# Patient Record
Sex: FEMALE | Race: WHITE | NOT HISPANIC OR LATINO | Employment: FULL TIME | URBAN - NONMETROPOLITAN AREA
[De-identification: names, ages, dates, MRNs, and addresses within clinical notes are randomized per-mention and may not be internally consistent; named-entity substitution may affect disease eponyms.]

---

## 2017-07-28 ENCOUNTER — OFFICE VISIT (OUTPATIENT)
Dept: INTERNAL MEDICINE | Facility: CLINIC | Age: 22
End: 2017-07-28

## 2017-07-28 VITALS
HEIGHT: 65 IN | BODY MASS INDEX: 48.82 KG/M2 | OXYGEN SATURATION: 98 % | TEMPERATURE: 96.4 F | WEIGHT: 293 LBS | SYSTOLIC BLOOD PRESSURE: 100 MMHG | HEART RATE: 68 BPM | DIASTOLIC BLOOD PRESSURE: 72 MMHG

## 2017-07-28 DIAGNOSIS — S01.512A LACERATION OF LABIAL MUCOSA WITHOUT COMPLICATION, INITIAL ENCOUNTER: Primary | ICD-10-CM

## 2017-07-28 PROCEDURE — 99213 OFFICE O/P EST LOW 20 MIN: CPT | Performed by: FAMILY MEDICINE

## 2017-07-29 NOTE — PROGRESS NOTES
Subjective    Gracie Cagle is a 22 y.o. female here for:  Chief Complaint   Patient presents with   • Injury     VAGINAL AREA INJURY; OCCURRED WEDNESDAY EVENING. PT CAME HOME AND DIDN'T TURN ON ANY LIGHTS AND SAT ON SOMETHING. SHE WENT TO THE BATHROOM AND NOTICED BLEEDING. SHE STILL HAS BLEEDING AND SWELLING TODAY     History of Present Illness     Patient thinks she's sustained an injury in the vaginal area. Started having bleeding and pain after sitting on cough Wednesday night, was dark. She felt something painful then noted the bleeding. Bleeding continued until yesterday, has ceased but she continues to have pain on the right side of genital area. She does not feel bleeding was vaginal. She denies any sexual abuse or other causes of injury, insists she sat on something. Believes last tetanus shot was with 6th grade vaccinations. Patient prefers to not have chaperone, feels more comfortable with me and does not wish to have a stranger in for exam.    The following portions of the patient's history were reviewed and updated as appropriate: allergies, current medications, past family history, past medical history, past social history, past surgical history and problem list.    Review of Systems    Vitals:    07/28/17 1450   BP: 100/72   Pulse: 68   Temp: 96.4 °F (35.8 °C)   SpO2: 98%       Objective   Physical Exam   Constitutional: Vital signs are normal. She appears well-developed and well-nourished. She is active. She does not have a sickly appearance. She does not appear ill.   Appears stated age. Well groomed. Obese     HENT:   Head: Normocephalic and atraumatic.   Right Ear: Hearing normal.   Left Ear: Hearing normal.   Eyes: EOM are normal. Pupils are equal, round, and reactive to light.   Neck: Neck supple.   Pulmonary/Chest: Effort normal.   Genitourinary:       Pelvic exam was performed with patient supine. No vaginal discharge found.   Neurological: She is alert. Gait normal.   Skin: Skin is  warm. She is not diaphoretic. No cyanosis. No pallor.   Psychiatric: She has a normal mood and affect. Her speech is normal and behavior is normal. Judgment normal.   Nursing note and vitals reviewed.      Assessment/Plan   Gracie was seen today for injury.    Diagnoses and all orders for this visit:    Laceration of labial mucosa without complication, initial encounter    keep area clean, may use cool compresses to help with discomfort. Should be fine re: tetanus, TDaP due next year. Patient denies sexual cause, I believe she's telling the truth. I urged her to look carefully at couch to see what she may have cut herself on.           Kiki Baeza MD

## 2017-08-21 ENCOUNTER — OFFICE VISIT (OUTPATIENT)
Dept: INTERNAL MEDICINE | Facility: CLINIC | Age: 22
End: 2017-08-21

## 2017-08-21 VITALS
TEMPERATURE: 96.4 F | HEIGHT: 65 IN | RESPIRATION RATE: 12 BRPM | SYSTOLIC BLOOD PRESSURE: 102 MMHG | WEIGHT: 284.2 LBS | OXYGEN SATURATION: 98 % | DIASTOLIC BLOOD PRESSURE: 70 MMHG | BODY MASS INDEX: 47.35 KG/M2 | HEART RATE: 94 BPM

## 2017-08-21 DIAGNOSIS — J02.9 ACUTE PHARYNGITIS, UNSPECIFIED ETIOLOGY: Primary | ICD-10-CM

## 2017-08-21 DIAGNOSIS — N92.6 MISSED MENSES: ICD-10-CM

## 2017-08-21 DIAGNOSIS — R30.0 DYSURIA: ICD-10-CM

## 2017-08-21 LAB
B-HCG UR QL: NEGATIVE
BILIRUB BLD-MCNC: ABNORMAL MG/DL
CLARITY, POC: CLEAR
COLOR UR: YELLOW
GLUCOSE UR STRIP-MCNC: NEGATIVE MG/DL
INTERNAL NEGATIVE CONTROL: NEGATIVE
INTERNAL POSITIVE CONTROL: POSITIVE
KETONES UR QL: NEGATIVE
LEUKOCYTE EST, POC: ABNORMAL
Lab: NORMAL
NITRITE UR-MCNC: NEGATIVE MG/ML
PH UR: 9 [PH] (ref 5–8)
PROT UR STRIP-MCNC: NEGATIVE MG/DL
RBC # UR STRIP: NEGATIVE /UL
SP GR UR: 1.01 (ref 1–1.03)
UROBILINOGEN UR QL: ABNORMAL

## 2017-08-21 PROCEDURE — 81025 URINE PREGNANCY TEST: CPT | Performed by: NURSE PRACTITIONER

## 2017-08-21 PROCEDURE — 99213 OFFICE O/P EST LOW 20 MIN: CPT | Performed by: NURSE PRACTITIONER

## 2017-08-21 RX ORDER — AMOXICILLIN AND CLAVULANATE POTASSIUM 875; 125 MG/1; MG/1
1 TABLET, FILM COATED ORAL 2 TIMES DAILY
Qty: 20 TABLET | Refills: 0 | Status: SHIPPED | OUTPATIENT
Start: 2017-08-21 | End: 2017-08-31

## 2017-08-21 NOTE — PROGRESS NOTES
Chief Complaint / Reason:      Chief Complaint   Patient presents with   • Vomiting   • Headache     started Thursday, got really bad Saturday.    • URI     sinus congestion       Subjective     HPI  Started out with headache and progressively got worse. Onset Thursday and upper respiratory issues started on Friday. Tried two 200 mg OTC motrin without relief. Vomited Friday and Saturday and missed her menstrual cycle.  Accompanied by her fiancée.. Patient indicates that she is not preventing pregnancy nor is she trying, She does not believe that she is pregnant at this time but she does not have regular periods and has been checked for PCO S in the past.  She states that her sisters did have PCOS.   History taken from: patient    PMH/FH/Social History were reviewed and updated appropriately in the electronic medical record.     Review of Systems:   Review of Systems   Constitutional: Positive for appetite change, fatigue and fever. Negative for chills.   HENT: Positive for congestion, sinus pressure, sore throat and trouble swallowing.    Respiratory: Positive for cough.    Cardiovascular: Negative.    Gastrointestinal: Positive for abdominal pain and nausea. Negative for blood in stool, constipation, diarrhea and vomiting.   Genitourinary: Positive for decreased urine volume. Negative for dysuria and frequency.   Neurological: Positive for headaches.     All other systems were reviewed and are negative.  Exceptions are noted in the subjective or above.      Objective     Vital Signs  Vitals:    08/21/17 1059   BP: 102/70   Pulse: 94   Resp: 12   Temp: 96.4 °F (35.8 °C)   SpO2: 98%       Body mass index is 47.29 kg/(m^2).    Physical Exam   Constitutional: She is oriented to person, place, and time. She appears well-developed and well-nourished.   HENT:   Head: Normocephalic.   Right Ear: External ear normal. Tympanic membrane is erythematous and bulging.   Left Ear: External ear normal. Tympanic membrane is  erythematous and bulging.   Nose: Mucosal edema present. Right sinus exhibits maxillary sinus tenderness and frontal sinus tenderness. Left sinus exhibits maxillary sinus tenderness and frontal sinus tenderness.   Mouth/Throat: Mucous membranes are dry. Oropharyngeal exudate, posterior oropharyngeal edema and posterior oropharyngeal erythema present.   Cardiovascular: Normal rate, regular rhythm, normal heart sounds and intact distal pulses.    Pulmonary/Chest: Effort normal and breath sounds normal.   Abdominal: Soft. Bowel sounds are normal. There is tenderness. There is no CVA tenderness.   Lymphadenopathy:        Head (right side): Tonsillar adenopathy present.        Head (left side): Tonsillar adenopathy present.     She has cervical adenopathy.        Right cervical: Superficial cervical adenopathy present.        Left cervical: Superficial cervical adenopathy present.   Neurological: She is alert and oriented to person, place, and time.   Skin: Skin is warm and dry.   Nursing note and vitals reviewed.       Results Review:    I reviewed the patient's new clinical results.     Medication Review:     Current Outpatient Prescriptions:   •  FLUoxetine (PROzac) 20 MG capsule, Take 1 capsule by mouth Daily., Disp: 30 capsule, Rfl: 6  •  ibuprofen (ADVIL,MOTRIN) 600 MG tablet, Every 6 (Six) Hours., Disp: , Rfl:   •  topiramate (TOPAMAX) 25 MG tablet, Take 1 tablet by mouth Daily., Disp: 30 tablet, Rfl: 6  •  omeprazole (PRILOSEC) 40 MG capsule, Take 1 capsule by mouth Daily., Disp: 30 capsule, Rfl: 6  •  prazosin (MINIPRESS) 1 MG capsule, Take 1 capsule by mouth Every Night., Disp: 30 capsule, Rfl: 6  •  Prenatal Vit-Fe Fumarate-FA (PRENATAL VITAMIN) 27-0.8 MG tablet, Take 1 tablet by mouth Daily., Disp: 30 tablet, Rfl: 11  •  triamcinolone (KENALOG) 0.1 % cream, Apply  topically 2 (Two) Times a Day., Disp: 45 g, Rfl: 6    Assessment/Plan   Gracie was seen today for vomiting, headache and uri.    Diagnoses and all  orders for this visit:    Acute pharyngitis, unspecified etiology  Keep throat moisten. May use chloraseptic spray for discomfort.  Gargle with warm saltwater or use throat lozenges to relieve the sore throat.   Worsening signs and symptoms discussed.  Rest, fluids, Motrin, and humidification.  Try eating soft, easy-to-swallow foods.  Missed menses  Discussed possible pregnancy and ways to prevent.   Dysuria  Increase fluid intake and rest.  Void often to empty bladder.  Wipe from front to back.  Empty your bladder after intercourse.   Avoid potentially irritating feminine products.  Follow up as needed or if symptoms worsen.    Alyce Mitchell, APRN  08/21/2017

## 2017-08-21 NOTE — PATIENT INSTRUCTIONS
Sore Throat  A sore throat is pain, burning, irritation, or scratchiness in the throat. When you have a sore throat, you may feel pain or tenderness in your throat when you swallow or talk.  Many things can cause a sore throat, including:  · An infection.  · Seasonal allergies.  · Dryness in the air.  · Irritants, such as smoke or pollution.  · Gastroesophageal reflux disease (GERD).  · A tumor.  A sore throat is often the first sign of another sickness. It may happen with other symptoms, such as coughing, sneezing, fever, and swollen neck glands. Most sore throats go away without medical treatment.  HOME CARE INSTRUCTIONS  · Take over-the-counter medicines only as told by your health care provider.  · Drink enough fluids to keep your urine clear or pale yellow.  · Rest as needed.  · To help with pain, try:    Sipping warm liquids, such as broth, herbal tea, or warm water.    Eating or drinking cold or frozen liquids, such as frozen ice pops.    Gargling with a salt-water mixture 3-4 times a day or as needed. To make a salt-water mixture, completely dissolve ½-1 tsp of salt in 1 cup of warm water.    Sucking on hard candy or throat lozenges.    Putting a cool-mist humidifier in your bedroom at night to moisten the air.    Sitting in the bathroom with the door closed for 5-10 minutes while you run hot water in the shower.  · Do not use any tobacco products, such as cigarettes, chewing tobacco, and e-cigarettes. If you need help quitting, ask your health care provider.  SEEK MEDICAL CARE IF:  · You have a fever for more than 2-3 days.  · You have symptoms that last (are persistent) for more than 2-3 days.  · Your throat does not get better within 7 days.  · You have a fever and your symptoms suddenly get worse.  SEEK IMMEDIATE MEDICAL CARE IF:  · You have difficulty breathing.  · You cannot swallow fluids, soft foods, or your saliva.  · You have increased swelling in your throat or neck.  · You have persistent nausea  and vomiting.     This information is not intended to replace advice given to you by your health care provider. Make sure you discuss any questions you have with your health care provider.     Document Released: 01/25/2006 Document Revised: 04/10/2017 Document Reviewed: 10/07/2016  [employer] Interactive Patient Education ©2017 [employer]  Dysuria  Dysuria is pain or discomfort while urinating. The pain or discomfort may be felt in the tube that carries urine out of the bladder (urethra) or in the surrounding tissue of the genitals. The pain may also be felt in the groin area, lower abdomen, and lower back. You may have to urinate frequently or have the sudden feeling that you have to urinate (urgency). Dysuria can affect both men and women, but is more common in women.  Dysuria can be caused by many different things, including:  · Urinary tract infection in women.  · Infection of the kidney or bladder.  · Kidney stones or bladder stones.  · Certain sexually transmitted infections (STIs), such as chlamydia.  · Dehydration.  · Inflammation of the vagina.  · Use of certain medicines.  · Use of certain soaps or scented products that cause irritation.  HOME CARE INSTRUCTIONS  Watch your dysuria for any changes. The following actions may help to reduce any discomfort you are feeling:  · Drink enough fluid to keep your urine clear or pale yellow.  · Empty your bladder often. Avoid holding urine for long periods of time.  · After a bowel movement or urination, women should cleanse from front to back, using each tissue only once.  · Empty your bladder after sexual intercourse.  · Take medicines only as directed by your health care provider.  · If you were prescribed an antibiotic medicine, finish it all even if you start to feel better.  · Avoid caffeine, tea, and alcohol. They can irritate the bladder and make dysuria worse. In men, alcohol may irritate the prostate.  · Keep all follow-up visits as directed by your health  care provider. This is important.  · If you had any tests done to find the cause of dysuria, it is your responsibility to obtain your test results. Ask the lab or department performing the test when and how you will get your results. Talk with your health care provider if you have any questions about your results.  SEEK MEDICAL CARE IF:  · You develop pain in your back or sides.  · You have a fever.  · You have nausea or vomiting.  · You have blood in your urine.  · You are not urinating as often as you usually do.  SEEK IMMEDIATE MEDICAL CARE IF:  · You pain is severe and not relieved with medicines.  · You are unable to hold down any fluids.  · You or someone else notices a change in your mental function.  · You have a rapid heartbeat at rest.  · You have shaking or chills.  · You feel extremely weak.     This information is not intended to replace advice given to you by your health care provider. Make sure you discuss any questions you have with your health care provider.     Document Released: 09/15/2005 Document Revised: 04/10/2017 Document Reviewed: 08/13/2015  [employer] Interactive Patient Education ©2017 [employer]

## 2017-09-15 ENCOUNTER — HOSPITAL ENCOUNTER (EMERGENCY)
Facility: HOSPITAL | Age: 22
Discharge: HOME | End: 2017-09-15
Attending: EMERGENCY MEDICINE | Admitting: EMERGENCY MEDICINE

## 2017-09-15 VITALS
BODY MASS INDEX: 46.65 KG/M2 | HEIGHT: 65 IN | DIASTOLIC BLOOD PRESSURE: 64 MMHG | OXYGEN SATURATION: 98 % | TEMPERATURE: 98.1 F | RESPIRATION RATE: 18 BRPM | HEART RATE: 83 BPM | WEIGHT: 280 LBS | SYSTOLIC BLOOD PRESSURE: 111 MMHG

## 2017-09-15 DIAGNOSIS — T63.481A INSECT STING, ACCIDENTAL OR UNINTENTIONAL, INITIAL ENCOUNTER: Primary | ICD-10-CM

## 2017-09-15 PROCEDURE — 63710000001 PREDNISONE PER 1 MG: Performed by: PHYSICIAN ASSISTANT

## 2017-09-15 PROCEDURE — 99283 EMERGENCY DEPT VISIT LOW MDM: CPT

## 2017-09-15 RX ORDER — DIPHENHYDRAMINE HCL 25 MG
50 TABLET ORAL EVERY 6 HOURS PRN
Qty: 16 TABLET | Refills: 0 | Status: SHIPPED | OUTPATIENT
Start: 2017-09-15 | End: 2018-01-22

## 2017-09-15 RX ORDER — EPINEPHRINE 0.3 MG/.3ML
0.3 INJECTION SUBCUTANEOUS ONCE
Qty: 2 EACH | Refills: 0 | Status: SHIPPED | OUTPATIENT
Start: 2017-09-15 | End: 2017-09-16

## 2017-09-15 RX ORDER — PREDNISONE 20 MG/1
40 TABLET ORAL ONCE
Status: COMPLETED | OUTPATIENT
Start: 2017-09-15 | End: 2017-09-15

## 2017-09-15 RX ORDER — DIPHENHYDRAMINE HCL 25 MG
25 TABLET ORAL EVERY 6 HOURS PRN
Qty: 16 TABLET | Refills: 0 | Status: SHIPPED | OUTPATIENT
Start: 2017-09-15 | End: 2017-09-15

## 2017-09-15 RX ORDER — FAMOTIDINE 20 MG/1
20 TABLET, FILM COATED ORAL 2 TIMES DAILY
Qty: 30 TABLET | Refills: 0 | Status: SHIPPED | OUTPATIENT
Start: 2017-09-15 | End: 2017-09-30

## 2017-09-15 RX ORDER — PREDNISONE 20 MG/1
20 TABLET ORAL 2 TIMES DAILY
Qty: 4 TABLET | Refills: 0 | Status: SHIPPED | OUTPATIENT
Start: 2017-09-15 | End: 2017-10-04

## 2017-09-15 RX ORDER — DIPHENHYDRAMINE HCL 25 MG
50 TABLET ORAL EVERY 6 HOURS PRN
Qty: 16 TABLET | Refills: 0 | Status: SHIPPED | OUTPATIENT
Start: 2017-09-15 | End: 2017-09-15

## 2017-09-15 RX ADMIN — PREDNISONE 40 MG: 20 TABLET ORAL at 16:34

## 2017-09-15 NOTE — DISCHARGE INSTRUCTIONS
Follow-up with your primary care provider early next week for recheck.  Return to emergency department if any change or worsening of symptoms.

## 2017-09-15 NOTE — ED PROVIDER NOTES
Subjective   HPI Comments: 22-year-old female presents to emergency department complaining of insect sting to his left parietal scalp.  Patient thinks it may have been a hornet or yellow jacket.  She states she pulled her hair and threw it on the ground.  Complains of sore throat, but no difficulty swallowing or controlling secretions.  She complains of some tightness in her chest but no shortness of breath or wheezing.  She does have a history of allergy to bee venom.  She had a prescription for an EpiPen when she was a child, but none recently.  She denies dizziness or orthostasis loss of consciousness chest pain lips and tongue swelling.  Abdominal pain diarrhea or rash.    Patient is a 22 y.o. female presenting with insect bite/sting.   History provided by:  Patient  Insect Bite   Attacking animal: Insect, possibly hornet or yellow jacket.  Time since incident:  2 hours  Pain details:     Quality:  Itching and burning    Severity:  Mild    Timing:  Unable to specify    Progression:  Unchanged  Incident location:  Home  Provoked: unprovoked    Animal's rabies vaccination status:  Unknown  Animal in possession: no    Tetanus status:  Unknown  Relieved by:  Nothing  Worsened by:  Nothing  Ineffective treatments:  None tried  Associated symptoms: no rash and no swelling        Review of Systems   Skin: Negative for rash.        Per history of present illness   All other systems reviewed and are negative.      Past Medical History:   Diagnosis Date   • Amenorrhea, secondary    • Depression    • Nightmares    • Obesity    • Ulnar neuropathy at elbow        Allergies   Allergen Reactions   • Bee Venom Anaphylaxis   • Sertraline    • Sertraline Hcl Other (See Comments)     Nightmares       History reviewed. No pertinent surgical history.    Family History   Problem Relation Age of Onset   • Depression Mother    • Diabetes Mother    • Hyperlipidemia Mother    • Hypertension Mother    • Arthritis Mother    • Mental  illness Mother    • Obesity Mother    • Arthritis Father    • COPD Father    • Depression Father    • Heart disease Father    • Hypertension Father    • Hyperlipidemia Father    • Stroke Father    • Obesity Father    • Mental illness Father    • Heart attack Father    • Depression Maternal Aunt    • Diabetes Maternal Aunt    • Hypertension Maternal Aunt    • Mental illness Maternal Aunt    • Obesity Maternal Aunt    • Cancer Paternal Aunt    • Diabetes Other      Grandmother        Social History     Social History   • Marital status:      Spouse name: N/A   • Number of children: N/A   • Years of education: N/A     Social History Main Topics   • Smoking status: Passive Smoke Exposure - Never Smoker     Packs/day: 0.50     Types: Cigarettes   • Smokeless tobacco: None   • Alcohol use No   • Drug use: No   • Sexual activity: Not Asked     Other Topics Concern   • None     Social History Narrative   • None           Objective   Physical Exam   Constitutional: She is oriented to person, place, and time. She appears well-developed and well-nourished. No distress.   Alert oriented, not in acute distress.  Resting comfortably, no respiratory distress stridor or wheezing difficulty controlling secretions cough rash.   HENT:   Head: Normocephalic and atraumatic.   Right Ear: External ear normal.   Left Ear: External ear normal.   Nose: Nose normal.   Mouth/Throat: Oropharynx is clear and moist. No oropharyngeal exudate.   Eyes: Conjunctivae and EOM are normal. Pupils are equal, round, and reactive to light. Right eye exhibits no discharge. Left eye exhibits no discharge. No scleral icterus.   Neck: Normal range of motion. Neck supple. No JVD present. No tracheal deviation present. No thyromegaly present.   Cardiovascular: Normal rate, regular rhythm, normal heart sounds and intact distal pulses.  Exam reveals no gallop and no friction rub.    No murmur heard.  Pulmonary/Chest: Effort normal and breath sounds normal.  No stridor. No respiratory distress. She has no wheezes. She has no rales.   Respiratory effort normal no tachypnea respiratory distress or accessory muscle use.  Thoracic expansion is normal.   Abdominal: Soft. She exhibits no distension and no mass. There is no tenderness. There is no guarding.   Musculoskeletal: Normal range of motion. She exhibits no edema, tenderness or deformity.   Neurological: She is alert and oriented to person, place, and time. She has normal reflexes. She displays normal reflexes. No cranial nerve deficit. She exhibits normal muscle tone. Coordination normal.   Skin: Skin is warm and dry. No rash noted. She is not diaphoretic. No erythema. No pallor.   No evidence of sting when evaluating scalp.  No erythema, no real.  No induration.   Psychiatric: She has a normal mood and affect. Her behavior is normal. Judgment and thought content normal.   Nursing note and vitals reviewed.      Procedures         ED Course  ED Course   Comment By Time   Patient had 25 mg of Benadryl in route.  She is in no acute distress.  She was given 40 mg of prednisone by mouth here in the emergency department.  We'll discharge to home with Pepcid and Benadryl and prednisone for 2 days.  Prescription for EpiPen given as well.  Follow-up with primary care provider and recheck.  Return to emergency department if any change or worsening.  Patient and family voice understanding and are agreeable to plan. Cedrick Elliott PA-C 09/15 1703                  [facility]    Final diagnoses:   Insect sting, accidental or unintentional, initial encounter            Cedrick Elliott PA-C  09/15/17 [id number]

## 2017-10-04 ENCOUNTER — OFFICE VISIT (OUTPATIENT)
Dept: INTERNAL MEDICINE | Facility: CLINIC | Age: 22
End: 2017-10-04

## 2017-10-04 VITALS
TEMPERATURE: 97.4 F | HEART RATE: 78 BPM | DIASTOLIC BLOOD PRESSURE: 78 MMHG | WEIGHT: 290 LBS | OXYGEN SATURATION: 97 % | HEIGHT: 65 IN | BODY MASS INDEX: 48.32 KG/M2 | SYSTOLIC BLOOD PRESSURE: 100 MMHG

## 2017-10-04 DIAGNOSIS — L20.84 INTRINSIC ECZEMA: Primary | ICD-10-CM

## 2017-10-04 DIAGNOSIS — Z91.030 BEE STING ALLERGY: ICD-10-CM

## 2017-10-04 PROCEDURE — 99214 OFFICE O/P EST MOD 30 MIN: CPT | Performed by: FAMILY MEDICINE

## 2017-10-04 RX ORDER — TRIAMCINOLONE ACETONIDE 1 MG/G
CREAM TOPICAL 2 TIMES DAILY
Qty: 45 G | Refills: 6 | Status: SHIPPED | OUTPATIENT
Start: 2017-10-04

## 2017-10-07 RX ORDER — EPINEPHRINE 0.3 MG/.3ML
0.3 INJECTION SUBCUTANEOUS ONCE
Qty: 1 EACH | Refills: 1 | Status: SHIPPED | OUTPATIENT
Start: 2017-10-07 | End: 2017-10-07

## 2017-10-07 NOTE — PROGRESS NOTES
Subjective    Gracie Cagle is a 22 y.o. female here for:  Chief Complaint   Patient presents with   • Insect Bite     ER F/U [facility] D/C 09/19; ANAPHYLAXIS FROM POSS BEE STING   • Eczema     RT 2ND DIGIT     History of Present Illness   Seen in ER after getting stung by bee. Took some benadryl but gradually she had development of shortness of breath, voice changes. Boyfriend convinced her to go to ER. She was treated and released with Rx for EpiPen. No issues since.    Also needs refill on steroid cream. She previously had a rash on the left forearm that was dry and red with scales and the cream I prescribed cleared it. Now she has a similar rash on her right 2nd finger.     The following portions of the patient's history were reviewed and updated as appropriate: allergies, current medications, past family history, past medical history, past social history, past surgical history and problem list.    Review of Systems   Constitutional: Negative for activity change.   Respiratory: Negative for shortness of breath and wheezing.        Vitals:    10/04/17 1111   BP: 100/78   Pulse: 78   Temp: 97.4 °F (36.3 °C)   SpO2: 97%       Objective   Physical Exam   Constitutional: She is oriented to person, place, and time. Vital signs are normal. She appears well-developed and well-nourished. She is active.  Non-toxic appearance. She does not appear ill.   Appears stated age. Well groomed. Extremely Obese     HENT:   Head: Normocephalic and atraumatic.   Right Ear: Hearing and external ear normal.   Left Ear: Hearing and external ear normal.   Nose: Nose normal.   Eyes: Conjunctivae, EOM and lids are normal. Pupils are equal, round, and reactive to light.   Neck: Phonation normal. Neck supple.   Cardiovascular: Normal rate and regular rhythm.    No murmur heard.  Pulmonary/Chest: Effort normal and breath sounds normal. No stridor.   Neurological: She is alert and oriented to person, place, and time. She has normal strength.  No cranial nerve deficit.   Skin: Skin is warm and dry. She is not diaphoretic. No cyanosis.        Psychiatric: She has a normal mood and affect. Her speech is normal and behavior is normal. Judgment and thought content normal. Cognition and memory are normal.   Nursing note and vitals reviewed.      Assessment/Plan   Gracie was seen today for insect bite and eczema.    Diagnoses and all orders for this visit:    Intrinsic eczema  -     triamcinolone (KENALOG) 0.1 % cream; Apply  topically 2 (Two) Times a Day.  -     Crisaborole (EUCRISA) 2 % ointment; Apply 1 application topically 2 (Two) Times a Day.    Bee sting allergy  -     EPINEPHrine (EPIPEN) 0.3 MG/0.3ML solution auto-injector injection; Inject 0.3 mL into the shoulder, thigh, or buttocks 1 (One) Time for 1 dose. Go to ER after dose.               Kiki Baeza MD

## 2017-10-11 ENCOUNTER — OFFICE VISIT (OUTPATIENT)
Dept: INTERNAL MEDICINE | Facility: CLINIC | Age: 22
End: 2017-10-11

## 2017-10-11 VITALS
HEART RATE: 78 BPM | DIASTOLIC BLOOD PRESSURE: 70 MMHG | HEIGHT: 65 IN | WEIGHT: 286 LBS | OXYGEN SATURATION: 98 % | BODY MASS INDEX: 47.65 KG/M2 | SYSTOLIC BLOOD PRESSURE: 94 MMHG | TEMPERATURE: 97.3 F

## 2017-10-11 DIAGNOSIS — R68.89 FLU-LIKE SYMPTOMS: ICD-10-CM

## 2017-10-11 DIAGNOSIS — J18.9 COMMUNITY ACQUIRED PNEUMONIA OF RIGHT LOWER LOBE OF LUNG: Primary | ICD-10-CM

## 2017-10-11 LAB
EXPIRATION DATE: NORMAL
FLUAV AG NPH QL: NEGATIVE
FLUBV AG NPH QL: NEGATIVE
INTERNAL CONTROL: NORMAL
Lab: NORMAL

## 2017-10-11 PROCEDURE — 99214 OFFICE O/P EST MOD 30 MIN: CPT | Performed by: FAMILY MEDICINE

## 2017-10-11 PROCEDURE — 87804 INFLUENZA ASSAY W/OPTIC: CPT | Performed by: FAMILY MEDICINE

## 2017-10-11 RX ORDER — FLUOXETINE HYDROCHLORIDE 20 MG/1
20 CAPSULE ORAL
COMMUNITY
End: 2017-10-11 | Stop reason: SDUPTHER

## 2017-10-11 RX ORDER — AZITHROMYCIN 250 MG/1
TABLET, FILM COATED ORAL
COMMUNITY
Start: 2017-10-10 | End: 2017-10-11

## 2017-10-11 RX ORDER — ALBUTEROL SULFATE 90 UG/1
2 AEROSOL, METERED RESPIRATORY (INHALATION) EVERY 6 HOURS PRN
Qty: 1 INHALER | Refills: 3 | Status: SHIPPED | OUTPATIENT
Start: 2017-10-11 | End: 2018-03-27 | Stop reason: SDUPTHER

## 2017-10-11 RX ORDER — AZITHROMYCIN 250 MG/1
TABLET, FILM COATED ORAL
COMMUNITY
Start: 2017-10-10 | End: 2017-10-11 | Stop reason: SDUPTHER

## 2017-10-11 RX ORDER — LEVOFLOXACIN 750 MG/1
750 TABLET ORAL DAILY
Qty: 5 TABLET | Refills: 0 | Status: SHIPPED | OUTPATIENT
Start: 2017-10-11 | End: 2017-10-16

## 2017-10-11 RX ORDER — TOPIRAMATE 25 MG/1
25 CAPSULE, COATED PELLETS ORAL
COMMUNITY
End: 2017-10-11 | Stop reason: SDUPTHER

## 2017-10-11 NOTE — PROGRESS NOTES
"Subjective    Gracie Cagle is a 22 y.o. female here for:  Chief Complaint   Patient presents with   • URI     PT WENT TO [facility] YESTERDAY FOR CONGESTION AND FLU-LIKE SYMPTOMS. PATIENT WAS NOT FLU TESTED, WAS GIVEN AN INJECTION (UNK MED). PATIENT DOES NOT FEEL BETTER, COUGHING UP BLOOD AND HURTING IN CHEST WHEN COUGHING.     History of Present Illness   Sick for few days with cough that is productive of sputum, yellow with blood, shortness of breath, fatigue, feeling hot and cold. No measured fevers. Was seen in ER due to shortness of breath. Was given \"antibiotic\" and sent out. Used mom's oxygen last night which helped. Taking azithromycin that er doctor prescribed and worsening.    The following portions of the patient's history were reviewed and updated as appropriate: allergies, current medications, past family history, past medical history, past social history, past surgical history and problem list.    Review of Systems   Constitutional: Positive for activity change, chills and unexpected weight change.   HENT: Positive for congestion.    Respiratory: Positive for cough, shortness of breath and wheezing.        Vitals:    10/11/17 1609   BP: 94/70   Pulse: 78   Temp: 97.3 °F (36.3 °C)   SpO2: 98%       Objective   Physical Exam   Constitutional: She is oriented to person, place, and time. Vital signs are normal. She appears well-developed and well-nourished. She is active. She does not have a sickly appearance. She appears ill.   Appears stated age. Well groomed. morbidly obese.   HENT:   Head: Normocephalic and atraumatic. Hair is normal.   Right Ear: Hearing normal.   Left Ear: Hearing normal.   Nose: Nose normal.   Eyes: EOM and lids are normal. Pupils are equal, round, and reactive to light. No scleral icterus.   Neck: Phonation normal. Neck supple.   Cardiovascular: Normal rate, regular rhythm and normal heart sounds.  Exam reveals no gallop and no friction rub.    No murmur heard.  Pulmonary/Chest: " Effort normal. She has wheezes in the right middle field and the right lower field. She has rhonchi in the right lower field.   Musculoskeletal: She exhibits no deformity.   Neurological: She is alert and oriented to person, place, and time. She displays no tremor. No cranial nerve deficit. Gait normal.   Skin: Skin is warm. No rash noted. She is not diaphoretic. No cyanosis. Nails show no clubbing.   Psychiatric: She has a normal mood and affect. Her speech is normal and behavior is normal. Judgment and thought content normal. Cognition and memory are normal.   Nursing note and vitals reviewed.       Office Visit on 10/11/2017   Component Date Value Ref Range Status   • Rapid Influenza A Ag 10/11/2017 NEGATIVE   Final   • Rapid Influenza B Ag 10/11/2017 NEGATIVE   Final   • Internal Control 10/11/2017 Passed  Passed Final   • Lot Number 10/11/2017 [id number]   Final   • Expiration Date 10/11/2017 02/01/2019   Final         Assessment/Plan   Gracie was seen today for uri.    Diagnoses and all orders for this visit:    Community acquired pneumonia of right lower lobe of lung  -     albuterol (PROVENTIL HFA;VENTOLIN HFA) 108 (90 Base) MCG/ACT inhaler; Inhale 2 puffs Every 6 (Six) Hours As Needed for Wheezing or Shortness of Air.  -     levoFLOXacin (LEVAQUIN) 750 MG tablet; Take 1 tablet by mouth Daily for 5 days.    Flu-like symptoms  -     POCT Influenza A/B    given decadron per chart everywhere. Changed antibiotic, stop azithromycin. Stressed hydration. Return to clinic if not improving.           Kiki Baeza MD

## 2017-10-21 PROBLEM — T63.441A BEE STING REACTION: Status: ACTIVE | Noted: 2017-10-21

## 2017-10-21 PROBLEM — J40 BRONCHITIS: Status: ACTIVE | Noted: 2017-10-21

## 2017-11-06 ENCOUNTER — APPOINTMENT (OUTPATIENT)
Dept: CT IMAGING | Facility: HOSPITAL | Age: 22
End: 2017-11-06

## 2017-11-06 ENCOUNTER — HOSPITAL ENCOUNTER (EMERGENCY)
Facility: HOSPITAL | Age: 22
Discharge: HOME | End: 2017-11-06
Attending: EMERGENCY MEDICINE | Admitting: EMERGENCY MEDICINE

## 2017-11-06 VITALS
RESPIRATION RATE: 18 BRPM | OXYGEN SATURATION: 100 % | HEIGHT: 65 IN | SYSTOLIC BLOOD PRESSURE: 115 MMHG | WEIGHT: 260 LBS | HEART RATE: 84 BPM | BODY MASS INDEX: 43.32 KG/M2 | DIASTOLIC BLOOD PRESSURE: 72 MMHG | TEMPERATURE: 97.8 F

## 2017-11-06 DIAGNOSIS — R51.9 NONINTRACTABLE HEADACHE, UNSPECIFIED CHRONICITY PATTERN, UNSPECIFIED HEADACHE TYPE: Primary | ICD-10-CM

## 2017-11-06 PROCEDURE — 25010000002 KETOROLAC TROMETHAMINE PER 15 MG: Performed by: EMERGENCY MEDICINE

## 2017-11-06 PROCEDURE — 25010000002 PROCHLORPERAZINE EDISYLATE PER 10 MG: Performed by: EMERGENCY MEDICINE

## 2017-11-06 PROCEDURE — 96374 THER/PROPH/DIAG INJ IV PUSH: CPT

## 2017-11-06 PROCEDURE — 70450 CT HEAD/BRAIN W/O DYE: CPT

## 2017-11-06 PROCEDURE — 96361 HYDRATE IV INFUSION ADD-ON: CPT

## 2017-11-06 PROCEDURE — 25010000002 DIPHENHYDRAMINE PER 50 MG: Performed by: EMERGENCY MEDICINE

## 2017-11-06 PROCEDURE — 99283 EMERGENCY DEPT VISIT LOW MDM: CPT

## 2017-11-06 PROCEDURE — 96375 TX/PRO/DX INJ NEW DRUG ADDON: CPT

## 2017-11-06 RX ORDER — DIPHENHYDRAMINE HYDROCHLORIDE 50 MG/ML
25 INJECTION INTRAMUSCULAR; INTRAVENOUS ONCE
Status: COMPLETED | OUTPATIENT
Start: 2017-11-06 | End: 2017-11-06

## 2017-11-06 RX ORDER — BUTALBITAL, ACETAMINOPHEN AND CAFFEINE 50; 325; 40 MG/1; MG/1; MG/1
1 TABLET ORAL EVERY 6 HOURS PRN
Qty: 12 TABLET | Refills: 0 | Status: SHIPPED | OUTPATIENT
Start: 2017-11-06 | End: 2018-03-27

## 2017-11-06 RX ORDER — KETOROLAC TROMETHAMINE 30 MG/ML
10 INJECTION, SOLUTION INTRAMUSCULAR; INTRAVENOUS ONCE
Status: COMPLETED | OUTPATIENT
Start: 2017-11-06 | End: 2017-11-06

## 2017-11-06 RX ADMIN — KETOROLAC TROMETHAMINE 10 MG: 30 INJECTION, SOLUTION INTRAMUSCULAR at 17:15

## 2017-11-06 RX ADMIN — SODIUM CHLORIDE 1000 ML: 9 INJECTION, SOLUTION INTRAVENOUS at 17:13

## 2017-11-06 RX ADMIN — PROCHLORPERAZINE EDISYLATE 10 MG: 5 INJECTION INTRAMUSCULAR; INTRAVENOUS at 17:16

## 2017-11-06 RX ADMIN — DIPHENHYDRAMINE HYDROCHLORIDE 25 MG: 50 INJECTION, SOLUTION INTRAMUSCULAR; INTRAVENOUS at 17:14

## 2017-11-09 DIAGNOSIS — F33.9 RECURRENT MAJOR DEPRESSIVE DISORDER, REMISSION STATUS UNSPECIFIED (HCC): ICD-10-CM

## 2017-11-09 RX ORDER — TOPIRAMATE 25 MG/1
TABLET ORAL
Qty: 30 TABLET | Refills: 0 | Status: SHIPPED | OUTPATIENT
Start: 2017-11-09 | End: 2017-12-13 | Stop reason: SDUPTHER

## 2017-11-09 RX ORDER — FLUOXETINE HYDROCHLORIDE 20 MG/1
CAPSULE ORAL
Qty: 30 CAPSULE | Refills: 0 | Status: SHIPPED | OUTPATIENT
Start: 2017-11-09 | End: 2017-12-13 | Stop reason: SDUPTHER

## 2017-11-22 ENCOUNTER — OFFICE VISIT (OUTPATIENT)
Dept: INTERNAL MEDICINE | Facility: CLINIC | Age: 22
End: 2017-11-22

## 2017-11-22 VITALS
RESPIRATION RATE: 14 BRPM | BODY MASS INDEX: 48.82 KG/M2 | OXYGEN SATURATION: 95 % | WEIGHT: 293 LBS | HEART RATE: 82 BPM | TEMPERATURE: 97.6 F | SYSTOLIC BLOOD PRESSURE: 112 MMHG | HEIGHT: 65 IN | DIASTOLIC BLOOD PRESSURE: 70 MMHG

## 2017-11-22 DIAGNOSIS — N30.01 ACUTE CYSTITIS WITH HEMATURIA: Primary | ICD-10-CM

## 2017-11-22 LAB
B-HCG UR QL: NEGATIVE
BILIRUB BLD-MCNC: NEGATIVE MG/DL
CLARITY, POC: ABNORMAL
COLOR UR: YELLOW
GLUCOSE UR STRIP-MCNC: NEGATIVE MG/DL
INTERNAL NEGATIVE CONTROL: NEGATIVE
INTERNAL POSITIVE CONTROL: POSITIVE
KETONES UR QL: NEGATIVE
LEUKOCYTE EST, POC: ABNORMAL
Lab: NORMAL
NITRITE UR-MCNC: NEGATIVE MG/ML
PH UR: 7 [PH] (ref 5–8)
PROT UR STRIP-MCNC: NEGATIVE MG/DL
RBC # UR STRIP: ABNORMAL /UL
SP GR UR: 1.01 (ref 1–1.03)
UROBILINOGEN UR QL: NORMAL

## 2017-11-22 PROCEDURE — 99213 OFFICE O/P EST LOW 20 MIN: CPT | Performed by: FAMILY MEDICINE

## 2017-11-22 PROCEDURE — 81025 URINE PREGNANCY TEST: CPT | Performed by: FAMILY MEDICINE

## 2017-11-22 RX ORDER — CIPROFLOXACIN 250 MG/1
250 TABLET, FILM COATED ORAL 2 TIMES DAILY
Qty: 6 TABLET | Refills: 0 | Status: SHIPPED | OUTPATIENT
Start: 2017-11-22 | End: 2017-11-25

## 2017-11-22 NOTE — PATIENT INSTRUCTIONS
Urinary Tract Infection, Adult  A urinary tract infection (UTI) is an infection of any part of the urinary tract, which includes the kidneys, ureters, bladder, and urethra. These organs make, store, and get rid of urine in the body. UTI can be a bladder infection (cystitis) or kidney infection (pyelonephritis).  CAUSES  This infection may be caused by fungi, viruses, or bacteria. Bacteria are the most common cause of UTIs. This condition can also be caused by repeated incomplete emptying of the bladder during urination.   RISK FACTORS  This condition is more likely to develop if:   · You ignore your need to urinate or hold urine for long periods of time.  · You do not empty your bladder completely during urination.  · You wipe back to front after urinating or having a bowel movement, if you are female.  · You are uncircumcised, if you are male.    · You are constipated.  · You have a urinary catheter that stays in place (indwelling).  · You have a weak defense (immune) system.  · You have a medical condition that affects your bowels, kidneys, or bladder.  · You have diabetes.  · You take antibiotic medicines frequently or for long periods of time, and the antibiotics no longer work well against certain types of infections (antibiotic resistance).  · You take medicines that irritate your urinary tract.  · You are exposed to chemicals that irritate your urinary tract.  · You are female.  SYMPTOMS   Symptoms of this condition include:   · Fever.    · Frequent urination or passing small amounts of urine frequently.    · Needing to urinate urgently.    · Pain or burning with urination.    · Urine that smells bad or unusual.    · Cloudy urine.    · Pain in the lower abdomen or back.    · Trouble urinating.    · Blood in the urine.    · Vomiting or being less hungry than normal.     · Diarrhea or abdominal pain.    · Vaginal discharge, if you are female.  DIAGNOSIS  This condition is diagnosed with a medical history and  physical exam. You will also need to provide a urine sample to test your urine. Other tests may be done, including:    · Blood tests.    · Sexually transmitted disease (STD) testing.     If you have had more than one UTI, a cystoscopy or imaging studies may be done to determine the cause of the infections.   TREATMENT   Treatment for this condition often includes a combination of two or more of the following:   · Antibiotic medicine.    · Other medicines to treat less common causes of UTI.    · Over-the-counter medicines to treat pain.    · Drinking enough water to stay hydrated.  HOME CARE INSTRUCTIONS  · Take over-the-counter and prescription medicines only as told by your health care provider.  · If you were prescribed an antibiotic, take it as told by your health care provider. Do not stop taking the antibiotic even if you start to feel better.  · Avoid alcohol, caffeine, tea, and carbonated beverages. They can irritate your bladder.  · Drink enough fluid to keep your urine clear or pale yellow.  · Keep all follow-up visits as told by your health care provider. This is important.  · Make sure to:    Empty your bladder often and completely. Do not hold urine for long periods of time.    Empty your bladder before and after sex.    Wipe from front to back after a bowel movement if you are female. Use each tissue one time when you wipe.  SEEK MEDICAL CARE IF:   · You have back pain.    · You have a fever.  · You feel nauseous or vomit.    · Your symptoms do not get better after 3 days.     · Your symptoms go away and then return.  SEEK IMMEDIATE MEDICAL CARE IF:   · You have severe back pain or lower abdominal pain.    · You are vomiting and cannot keep down any medicines or water.     This information is not intended to replace advice given to you by your health care provider. Make sure you discuss any questions you have with your health care provider.     Document Released: 09/27/2006 Document Revised: 04/10/2017  Document Reviewed: 11/07/2016  [employer] Interactive Patient Education ©2017 [employer]

## 2017-11-22 NOTE — PROGRESS NOTES
Subjective    Gracie Cagle is a 22 y.o. female here for:  Chief Complaint   Patient presents with   • Nausea     for about a week   • Headache   • Dizziness     History of Present Illness   Headache. This is not like her typical migraines. She has light sensitivity, nausea, throwing up. She's been dizzy. Having to urinate more often. It hurts a bit to urinate. Has taken pregnancy test at home and was negative. Historically has not had regular menses. Has gotten  since last visit.     The following portions of the patient's history were reviewed and updated as appropriate: allergies, current medications, past family history, past medical history, past social history, past surgical history and problem list.    Review of Systems    Vitals:    11/22/17 1147   BP: 112/70   Pulse: 82   Resp: 14   Temp: 97.6 °F (36.4 °C)   SpO2: 95%       Objective   Physical Exam   Constitutional: She is oriented to person, place, and time. Vital signs are normal. She appears well-developed and well-nourished. She is active.  Non-toxic appearance. She does not appear ill.   Appears stated age. Well groomed.    HENT:   Head: Normocephalic and atraumatic.   Right Ear: Hearing normal.   Left Ear: Hearing normal.   Eyes: EOM are normal. Pupils are equal, round, and reactive to light. No scleral icterus.   Neck: Neck supple.   Pulmonary/Chest: Effort normal.   Abdominal: There is no CVA tenderness.   Neurological: She is alert and oriented to person, place, and time. Gait normal.   Skin: Skin is warm. She is not diaphoretic. No cyanosis. No pallor.   Psychiatric: She has a normal mood and affect. Her speech is normal and behavior is normal. Judgment normal.   Nursing note and vitals reviewed.    Office Visit on 11/22/2017   Component Date Value Ref Range Status   • Color 11/22/2017 Yellow  Yellow, Straw, Dark Yellow, Nora Final   • Clarity, UA 11/22/2017 Cloudy* Clear Final   • Glucose, UA 11/22/2017 Negative  Negative, 1000  mg/dL (3+) mg/dL Final   • Bilirubin 11/22/2017 Negative  Negative Final   • Ketones, UA 11/22/2017 Negative  Negative Final   • Specific Gravity  11/22/2017 1.010  1.005 - 1.030 Final   • Blood, UA 11/22/2017 50 Shantanu/ul* Negative Final   • pH, Urine 11/22/2017 7.0  5.0 - 8.0 Final   • Protein, POC 11/22/2017 Negative  Negative mg/dL Final   • Urobilinogen, UA 11/22/2017 Normal  Normal Final   • Leukocytes 11/22/2017 25 Vick/ul* Negative Final   • Nitrite, UA 11/22/2017 Negative  Negative Final   • HCG, Urine, QL 11/22/2017 Negative  Negative Final   • Lot Number 11/22/2017 [id number]   Final   • Internal Positive Control 11/22/2017 Positive   Final   • Internal Negative Control 11/22/2017 Negative   Final         Assessment/Plan   Gracie was seen today for nausea, headache and dizziness.    Diagnoses and all orders for this visit:    Acute cystitis with hematuria  -     ciprofloxacin (CIPRO) 250 MG tablet; Take 1 tablet by mouth 2 (Two) Times a Day for 3 days.  -     POC Urinalysis Dipstick, Automated  -     POC Pregnancy, Urine               Kiki Baeza MD

## 2017-12-13 DIAGNOSIS — F33.9 RECURRENT MAJOR DEPRESSIVE DISORDER, REMISSION STATUS UNSPECIFIED (HCC): ICD-10-CM

## 2017-12-13 RX ORDER — FLUOXETINE HYDROCHLORIDE 20 MG/1
CAPSULE ORAL
Qty: 30 CAPSULE | Refills: 5 | Status: SHIPPED | OUTPATIENT
Start: 2017-12-13 | End: 2018-07-27 | Stop reason: SDUPTHER

## 2017-12-13 RX ORDER — TOPIRAMATE 25 MG/1
TABLET ORAL
Qty: 30 TABLET | Refills: 5 | Status: SHIPPED | OUTPATIENT
Start: 2017-12-13 | End: 2018-03-27 | Stop reason: SDUPTHER

## 2018-01-22 ENCOUNTER — OFFICE VISIT (OUTPATIENT)
Dept: INTERNAL MEDICINE | Facility: CLINIC | Age: 23
End: 2018-01-22

## 2018-01-22 VITALS
DIASTOLIC BLOOD PRESSURE: 70 MMHG | HEART RATE: 87 BPM | BODY MASS INDEX: 48.82 KG/M2 | SYSTOLIC BLOOD PRESSURE: 100 MMHG | OXYGEN SATURATION: 98 % | HEIGHT: 65 IN | WEIGHT: 293 LBS | TEMPERATURE: 98.5 F

## 2018-01-22 DIAGNOSIS — N64.59 ABNORMAL BREAST FINDING: Primary | ICD-10-CM

## 2018-01-22 PROCEDURE — 99213 OFFICE O/P EST LOW 20 MIN: CPT | Performed by: PHYSICIAN ASSISTANT

## 2018-01-22 NOTE — PROGRESS NOTES
Gracie Clemente is a 22 y.o. female.     Subjective   History of Present Illness   Noticed a red [place] on the right breast about a month ago which has gotten larger since she first noticed it. Denies pain or itching. Only known family history of breast cancer was in a maternal aunt.         The following portions of the patient's history were reviewed and updated as appropriate: allergies, current medications, past family history, past medical history, past social history, past surgical history and problem list.    Review of Systems    Constitutional: Negative for appetite change, chills, fatigue, fever and unexpected weight change.   HENT: Negative for congestion, ear pain, hearing loss, nosebleeds, postnasal drip, rhinorrhea, sore throat, tinnitus and trouble swallowing.    Eyes: Negative for photophobia, discharge and visual disturbance.   Respiratory: Negative for cough, chest tightness, shortness of breath and wheezing.    Cardiovascular: Negative for chest pain, palpitations and leg swelling.   Gastrointestinal: Negative for abdominal distention, abdominal pain, blood in stool, constipation, diarrhea, nausea and vomiting.   Endocrine: Negative for cold intolerance, heat intolerance, polydipsia, polyphagia and polyuria.   Musculoskeletal: Negative for arthralgias, back pain, joint swelling, myalgias, neck pain and neck stiffness.   Skin: redness of right breast. Negative for pallor, rash and wound.   Allergic/Immunologic: Negative for environmental allergies, food allergies and immunocompromised state.   Neurological: Negative for dizziness, tremors, seizures, weakness, numbness and headaches.   Hematological: Negative for adenopathy. Does not bruise/bleed easily.   Psychiatric/Behavioral: Negative for sleep disturbances, agitation, behavioral problems, confusion, hallucinations, self-injury and suicidal ideas. The patient is not nervous/anxious.      Objective    Physical Exam   Pulmonary/Chest: Right  "breast exhibits skin change. Right breast exhibits no inverted nipple, no mass, no nipple discharge and no tenderness.         Constitutional: Oriented to person, place, and time. Appears well-developed and well-nourished.   HENT:   Head: Normocephalic and atraumatic.   Eyes: EOM are normal. Pupils are equal, round, and reactive to light.   Neck: Normal range of motion. Neck supple.   Cardiovascular: Normal rate, regular rhythm and normal heart sounds.    Pulmonary/Chest: Effort normal and breath sounds normal. No respiratory distress.  Has no wheezes or rales. Exhibits no chest wall tenderness.   Abdominal: Soft. Bowel sounds are normal. Exhibits no distension and no mass. There is no tenderness.   Musculoskeletal: Normal range of motion. Exhibits no tenderness.   Neurological: Alert and oriented to person, place, and time.   Skin: erythematous halo of discoloration over approximately 1/3 of the right breast. Skin is warm and dry.   Psychiatric: Has a normal mood and affect. Behavior is normal. Judgment and thought content normal.       /70  Pulse 87  Temp 98.5 °F (36.9 °C)  Ht 165.1 cm (65\")  Wt 136 kg (300 lb)  SpO2 98%  BMI 49.92 kg/m2    Nursing note and vitals reviewed.        Assessment/Plan   Gracie was seen today for red [place] r breast.    Diagnoses and all orders for this visit:    Abnormal breast finding  -     US breast right limited      If symptoms worsen prior to ultrasound resulting will consider initiating antibiotic with presumption of cellulitis.          "

## 2018-01-29 ENCOUNTER — HOSPITAL ENCOUNTER (OUTPATIENT)
Dept: ULTRASOUND IMAGING | Facility: HOSPITAL | Age: 23
Discharge: HOME | End: 2018-01-29
Admitting: PHYSICIAN ASSISTANT

## 2018-01-29 PROCEDURE — 76642 ULTRASOUND BREAST LIMITED: CPT

## 2018-01-29 RX ORDER — SULFAMETHOXAZOLE AND TRIMETHOPRIM 800; 160 MG/1; MG/1
1 TABLET ORAL 2 TIMES DAILY
Qty: 20 TABLET | Refills: 0 | Status: SHIPPED | OUTPATIENT
Start: 2018-01-29 | End: 2018-02-08

## 2018-02-06 ENCOUNTER — OFFICE VISIT (OUTPATIENT)
Dept: INTERNAL MEDICINE | Facility: CLINIC | Age: 23
End: 2018-02-06

## 2018-02-06 VITALS
RESPIRATION RATE: 14 BRPM | OXYGEN SATURATION: 98 % | DIASTOLIC BLOOD PRESSURE: 78 MMHG | BODY MASS INDEX: 48.82 KG/M2 | HEART RATE: 107 BPM | TEMPERATURE: 99.3 F | WEIGHT: 293 LBS | SYSTOLIC BLOOD PRESSURE: 118 MMHG | HEIGHT: 65 IN

## 2018-02-06 DIAGNOSIS — K21.00 REFLUX ESOPHAGITIS: Primary | ICD-10-CM

## 2018-02-06 DIAGNOSIS — L53.8 MACULAR ERYTHEMATOUS RASH: ICD-10-CM

## 2018-02-06 DIAGNOSIS — E66.01 MORBID OBESITY (HCC): ICD-10-CM

## 2018-02-06 PROCEDURE — 99214 OFFICE O/P EST MOD 30 MIN: CPT | Performed by: FAMILY MEDICINE

## 2018-02-06 RX ORDER — RANITIDINE 300 MG/1
300 TABLET ORAL NIGHTLY
Qty: 30 TABLET | Refills: 0 | Status: SHIPPED | OUTPATIENT
Start: 2018-02-06 | End: 2019-03-08

## 2018-02-06 RX ORDER — FLUCONAZOLE 150 MG/1
150 TABLET ORAL WEEKLY
Qty: 3 TABLET | Refills: 0 | Status: SHIPPED | OUTPATIENT
Start: 2018-02-06 | End: 2018-02-21

## 2018-02-06 NOTE — PROGRESS NOTES
Subjective    Gracie Clemente is a 22 y.o. female here for:  Chief Complaint   Patient presents with   • Heartburn     reflux when laying down   • spot on breast     spot on right breast getting worse, ultrasound was negative     History of Present Illness     She has terrible reflux when she lays down. Seems to be present more with antibiotic use recently. Has not tried any medicine.    Continues to have red rash on right breast. No other rashes noted. Tried antifungal cream for at least a couple of weeks. Redness is not painful nor itchy. No improvement with Bactrim, she's half way in the course of medicine. No nipple discharge. No tick bites.    Also asks what she could do to lose weight. Considering bariatric procedures. Not exercising and not counting calories. Chronic issue that is not improving over time.    The following portions of the patient's history were reviewed and updated as appropriate: allergies, current medications, past family history, past medical history, past social history, past surgical history and problem list.    Review of Systems   Constitutional: Negative for activity change and unexpected weight change.   Respiratory: Negative for shortness of breath.    Cardiovascular: Negative for chest pain.   Gastrointestinal: Negative for abdominal pain.   Skin: Positive for rash.       Vitals:    02/06/18 0845   BP: 118/78   Pulse: 107   Resp: 14   Temp: 99.3 °F (37.4 °C)   SpO2: 98%         Objective   Physical Exam   Constitutional: She is oriented to person, place, and time. Vital signs are normal. She appears well-developed and well-nourished. She is active. She does not have a sickly appearance. She does not appear ill.   Appears stated age. Well groomed. Morbid obesity   HENT:   Head: Normocephalic and atraumatic. Hair is normal.   Right Ear: Hearing normal.   Left Ear: Hearing normal.   Nose: Nose normal.   Eyes: EOM and lids are normal. Pupils are equal, round, and reactive to  light. No scleral icterus.   Neck: Phonation normal. Neck supple.   Cardiovascular: Normal rate, regular rhythm and normal heart sounds.  Exam reveals no gallop and no friction rub.    No murmur heard.  Pulmonary/Chest: Effort normal and breath sounds normal.   Musculoskeletal: She exhibits no deformity.   Neurological: She is alert and oriented to person, place, and time. She displays no tremor. No cranial nerve deficit. Gait normal.   Skin: Skin is warm. No rash noted. She is not diaphoretic. No cyanosis. Nails show no clubbing.        Psychiatric: She has a normal mood and affect. Her speech is normal and behavior is normal. Judgment and thought content normal. Cognition and memory are normal.   Nursing note and vitals reviewed.    Results for orders placed in visit on 01/22/18   US breast right limited    Narrative ULTRASOUND RIGHT BREAST     CLINICAL INDICATION:  Erythematous halo of discoloration over  approximately 1/3 of the right breast.; N64.59-Other signs and symptoms  in breast.     TECHNIQUE: Directed sonographic evaluation right breast.     COMPARISON: None.     FINDINGS:   No discrete cystic or solid mass lesion is seen. Echotexture  is unremarkable. No fluid collection is identified.       Impression No discrete mass. No evidence of abscess.     BI-RADS CATEGORY: 1 , NEGATIVE.        RECOMMENDED FOLLOW-UP:     Imaging follow-up is clinically warranted. No  immediate imaging follow-up recommended at this time.           NOTES: Mammography does not detect approximately 10-15% of breast  cancers. Physical examination of the breasts by a physician and regular  monthly breast self examinations are integral parts of breast cancer  screening.       A normal breast imaging exam does not exclude breast cancer if there is  an abnormal finding on physical examination. When clinically indicated,  a biopsy should not be postponed because of a normal breast imaging  report.          Please allow this report to  serve as a order for additional imaging  follow-up.        [place]. [id number].     NOTE: If a biopsy is performed on this patient, a copy of the pathology  report would be appreciated.     This report was finalized on 1/29/2018 11:26 AM by Titus Ashby MD.             Assessment/Plan       Gracie was seen today for heartburn and spot on breast.    Diagnoses and all orders for this visit:    Reflux esophagitis  -     raNITIdine (ZANTAC) 300 MG tablet; Take 1 tablet by mouth Every Night.    Macular erythematous rash  -     fluconazole (DIFLUCAN) 150 MG tablet; Take 1 tablet by mouth 1 (One) Time Per Week for 3 doses.  -     Ambulatory Referral to Dermatology    Morbid obesity      · Patient's BMI is above normal parameters. Follow-up plan includes:  exercise counseling and nutrition counseling.  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 Treat with antifungals but dermatology referral in case does not clear  · Reflux precautions given.    Return in about 4 weeks (around 3/6/2018) for Recheck.    Kiki Baeza MD    Please note that portions of this note may have been completed with a voice recognition program. Efforts were made to edit dictation, but occasionally words are mistranscribed.

## 2018-03-27 ENCOUNTER — OFFICE VISIT (OUTPATIENT)
Dept: INTERNAL MEDICINE | Facility: CLINIC | Age: 23
End: 2018-03-27

## 2018-03-27 ENCOUNTER — HOSPITAL ENCOUNTER (OUTPATIENT)
Dept: GENERAL RADIOLOGY | Facility: HOSPITAL | Age: 23
Discharge: HOME | End: 2018-03-27
Attending: FAMILY MEDICINE | Admitting: FAMILY MEDICINE

## 2018-03-27 VITALS
BODY MASS INDEX: 48.82 KG/M2 | TEMPERATURE: 97.6 F | HEART RATE: 84 BPM | HEIGHT: 65 IN | RESPIRATION RATE: 14 BRPM | SYSTOLIC BLOOD PRESSURE: 118 MMHG | DIASTOLIC BLOOD PRESSURE: 72 MMHG | OXYGEN SATURATION: 97 % | WEIGHT: 293 LBS

## 2018-03-27 DIAGNOSIS — K21.9 GASTROESOPHAGEAL REFLUX DISEASE, ESOPHAGITIS PRESENCE NOT SPECIFIED: ICD-10-CM

## 2018-03-27 DIAGNOSIS — Z72.0 TOBACCO ABUSE: ICD-10-CM

## 2018-03-27 DIAGNOSIS — L53.8 MACULAR ERYTHEMATOUS RASH: ICD-10-CM

## 2018-03-27 DIAGNOSIS — M25.561 ACUTE PAIN OF RIGHT KNEE: Primary | ICD-10-CM

## 2018-03-27 DIAGNOSIS — F33.9 RECURRENT MAJOR DEPRESSIVE DISORDER, REMISSION STATUS UNSPECIFIED (HCC): ICD-10-CM

## 2018-03-27 DIAGNOSIS — R06.02 SOB (SHORTNESS OF BREATH) ON EXERTION: ICD-10-CM

## 2018-03-27 DIAGNOSIS — E66.01 MORBID OBESITY (HCC): ICD-10-CM

## 2018-03-27 DIAGNOSIS — Z71.6 TOBACCO ABUSE COUNSELING: ICD-10-CM

## 2018-03-27 PROCEDURE — 73562 X-RAY EXAM OF KNEE 3: CPT

## 2018-03-27 PROCEDURE — 99214 OFFICE O/P EST MOD 30 MIN: CPT | Performed by: FAMILY MEDICINE

## 2018-03-27 PROCEDURE — 99406 BEHAV CHNG SMOKING 3-10 MIN: CPT | Performed by: FAMILY MEDICINE

## 2018-03-27 RX ORDER — ALBUTEROL SULFATE 90 UG/1
2 AEROSOL, METERED RESPIRATORY (INHALATION) EVERY 6 HOURS PRN
Qty: 1 INHALER | Refills: 3 | Status: SHIPPED | OUTPATIENT
Start: 2018-03-27 | End: 2018-08-21 | Stop reason: SDUPTHER

## 2018-03-27 RX ORDER — TOPIRAMATE 25 MG/1
25 TABLET ORAL 2 TIMES DAILY
Qty: 60 TABLET | Refills: 5 | Status: SHIPPED | OUTPATIENT
Start: 2018-03-27 | End: 2018-08-21

## 2018-03-27 RX ORDER — NICOTINE 21 MG/24HR
1 PATCH, TRANSDERMAL 24 HOURS TRANSDERMAL EVERY 24 HOURS
Qty: 30 PATCH | Refills: 1 | Status: SHIPPED | OUTPATIENT
Start: 2018-03-27 | End: 2018-04-24

## 2018-03-27 NOTE — ASSESSMENT & PLAN NOTE
Psychological condition is stable.  Continue current treatment regimen.  Psychological condition  will be reassessed at the next regular appointment.

## 2018-03-27 NOTE — PROGRESS NOTES
"Subjective    Gracie Clemente is a 22 y.o. female here for:  Chief Complaint   Patient presents with   • Obesity   • Heartburn   • right knee pain     History of Present Illness     Having severe right knee pain. Stood up the other day and got a sharp pain in the knee and it was difficult to bear weight on it. Then it started popping. Knee feels unstable somewhat and feels like it's catching/clicking. No known injuries. She feels a brace may help.    Heartburn is gone. Took the medicines twice and symptoms resolved.    She is wanting to lose weight. She is on Topamax to stabilize mood, no side effects. Also on Prozac. Not exercising regularly and not on a diet plan.    Wants to quit smoking. Has been smoking the last year up to a pack a day. Has not tried to quit previously.  also smokes, which may be a barrier to cessation, but he's wanting to quit as well.    She also has the rash, still, on the right breast area. Would like to see dermatology. Was supposed to go but lost the info.    The following portions of the patient's history were reviewed and updated as appropriate: allergies, current medications, past family history, past medical history, past social history, past surgical history and problem list.    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   Gastrointestinal: Negative for indigestion.   Musculoskeletal: Positive for arthralgias.   Psychiatric/Behavioral: Negative for behavioral problems.       Vitals:    03/27/18 1102   BP: 118/72   Pulse: 84   Resp: 14   Temp: 97.6 °F (36.4 °C)   SpO2: 97%   Weight: (!) 142 kg (312 lb)   Height: 165.1 cm (65\")         Objective   Physical Exam   Constitutional: She is oriented to person, place, and time. Vital signs are normal. She appears well-developed and well-nourished. She is active.  Non-toxic appearance. She does not appear ill. No distress. She is morbidly obese.  HENT: "   Head: Normocephalic and atraumatic. Hair is normal.   Right Ear: Hearing and external ear normal.   Left Ear: Hearing and external ear normal.   Nose: Nose normal.   Mouth/Throat: Mucous membranes are not dry.   Eyes: EOM and lids are normal. Pupils are equal, round, and reactive to light. No scleral icterus.   Neck: Neck supple.   Cardiovascular: Normal rate, regular rhythm and normal heart sounds.    No murmur heard.  Pulmonary/Chest: Effort normal and breath sounds normal.   Musculoskeletal: She exhibits no edema or deformity.        Right knee: She exhibits normal range of motion and no bony tenderness. Tenderness found. Lateral joint line and patellar tendon tenderness noted. No medial joint line tenderness noted.        Left knee: No tenderness found.   Positive Lachman right, negative left.   Neurological: She is alert and oriented to person, place, and time. She displays no tremor. No cranial nerve deficit. Gait normal.   Skin: Skin is warm and dry. No rash noted. She is not diaphoretic. No cyanosis. Nails show no clubbing.   Psychiatric: She has a normal mood and affect. Her speech is normal and behavior is normal. Judgment and thought content normal. Cognition and memory are normal.   Nursing note and vitals reviewed.        Assessment/Plan     Problem List Items Addressed This Visit        Digestive    Gastroesophageal reflux disease    Current Assessment & Plan     · Improved  · Continue Zantac as needed, avoid trigger foods  · Weight loss         Morbid obesity    Relevant Medications    topiramate (TOPAMAX) 25 MG tablet       Other    Depression    Current Assessment & Plan     Psychological condition is stable.  Continue current treatment regimen.  Psychological condition  will be reassessed at the next regular appointment.         Relevant Medications    nicotine (NICODERM CQ) 21 MG/24HR patch    topiramate (TOPAMAX) 25 MG tablet      Other Visit Diagnoses     Acute pain of right knee    -  Primary     Relevant Orders    XR Knee 3 View Right    Knee Sleeve    Macular erythematous rash        Relevant Orders    Ambulatory Referral to Dermatology    Tobacco abuse        Relevant Medications    nicotine (NICODERM CQ) 21 MG/24HR patch    Tobacco abuse counseling        SOB (shortness of breath) on exertion        Relevant Medications    albuterol (PROVENTIL HFA;VENTOLIN HFA) 108 (90 Base) MCG/ACT inhaler          I advised the patient of the risks in continuing to use tobacco, and I provided this patient with smoking cessation educational materials.    · During this visit, I spent > 3-10 minutes counseling the patient regarding smoking cessation.  · Discussed the patient's BMI with her. BMI is above normal parameters. Follow-up plan includes:  exercise counseling, nutrition counseling and pharmacological intervention.  · May need MRI knee or physical therapy if issue persists.    Return in about 4 weeks (around 4/24/2018) for Follow up on current issues.    Kiki Baeza MD    Please note that portions of this note may have been completed with a voice recognition program. Efforts were made to edit dictation, but occasionally words are mistranscribed.

## 2018-03-29 ENCOUNTER — TELEPHONE (OUTPATIENT)
Dept: INTERNAL MEDICINE | Facility: CLINIC | Age: 23
End: 2018-03-29

## 2018-04-02 ENCOUNTER — TELEPHONE (OUTPATIENT)
Dept: INTERNAL MEDICINE | Facility: CLINIC | Age: 23
End: 2018-04-02

## 2018-04-02 NOTE — TELEPHONE ENCOUNTER
Patient called and states she put a nicotine patch on and had a reaction to it at the site. She had to discontinue the patches and wants to know an alternative.

## 2018-04-24 ENCOUNTER — OFFICE VISIT (OUTPATIENT)
Dept: INTERNAL MEDICINE | Facility: CLINIC | Age: 23
End: 2018-04-24

## 2018-04-24 VITALS
HEART RATE: 74 BPM | OXYGEN SATURATION: 98 % | WEIGHT: 293 LBS | BODY MASS INDEX: 48.82 KG/M2 | DIASTOLIC BLOOD PRESSURE: 70 MMHG | TEMPERATURE: 97.7 F | SYSTOLIC BLOOD PRESSURE: 118 MMHG | RESPIRATION RATE: 14 BRPM | HEIGHT: 65 IN

## 2018-04-24 DIAGNOSIS — R06.02 SOB (SHORTNESS OF BREATH) ON EXERTION: ICD-10-CM

## 2018-04-24 DIAGNOSIS — F33.40 RECURRENT MAJOR DEPRESSIVE DISORDER, IN REMISSION (HCC): ICD-10-CM

## 2018-04-24 DIAGNOSIS — M23.51 RECURRENT RIGHT KNEE INSTABILITY: Primary | ICD-10-CM

## 2018-04-24 DIAGNOSIS — Z72.0 TOBACCO ABUSE: ICD-10-CM

## 2018-04-24 DIAGNOSIS — Z71.6 TOBACCO ABUSE COUNSELING: ICD-10-CM

## 2018-04-24 PROCEDURE — 99214 OFFICE O/P EST MOD 30 MIN: CPT | Performed by: FAMILY MEDICINE

## 2018-04-24 PROCEDURE — 99406 BEHAV CHNG SMOKING 3-10 MIN: CPT | Performed by: FAMILY MEDICINE

## 2018-04-24 NOTE — PROGRESS NOTES
"Subjective    Gracie Clemente is a 22 y.o. female here for:  Chief Complaint   Patient presents with   • Depression     History of Present Illness     Patches broke her out so she's used the gum more.  is still trying to quit. She's smoking about a pack a day, has smoked for a year. She's having some shortness of breath at times, albuterol helps.    She's been wearing the brace on the knee but then knee pops out of place it feels when she takes it off. Knee also seems to pop in her sleep. Popped when she was taking a walk.  says it is loud when it pops, can hear across the room. Does not hurt as bad when she gets up. Right knee. If she puts her weight on right knee she feels like she's rocking. Has been putting more weight on left side.     Mood seemed a bit \"unstable\" but she thinks it was from adjusted dose. She no longer has SI as she did in the past. Pleased at this point with depression treatment.    The following portions of the patient's history were reviewed and updated as appropriate: allergies, current medications, past family history, past medical history, past social history, past surgical history and problem list.    Review of Systems   Constitutional: Positive for activity change.   Respiratory: Positive for shortness of breath and wheezing.    Musculoskeletal: Positive for arthralgias, gait problem and joint swelling.       Vitals:    04/24/18 0815   BP: 118/70   Pulse: 74   Resp: 14   Temp: 97.7 °F (36.5 °C)   SpO2: 98%   Weight: (!) 142 kg (314 lb)   Height: 165.1 cm (65\")         Objective   Physical Exam   Constitutional: She is oriented to person, place, and time. Vital signs are normal. She appears well-developed and well-nourished. She is active.  Non-toxic appearance. She does not appear ill. No distress. She is morbidly obese.  HENT:   Head: Normocephalic and atraumatic. Hair is normal.   Right Ear: Hearing and external ear normal.   Left Ear: Hearing and external ear " normal.   Nose: Nose normal.   Mouth/Throat: Mucous membranes are not dry.   Eyes: EOM and lids are normal. Pupils are equal, round, and reactive to light. No scleral icterus.   Neck: Neck supple.   Cardiovascular: Normal rate, regular rhythm and normal heart sounds.    No murmur heard.  Pulmonary/Chest: Effort normal and breath sounds normal.   Musculoskeletal: She exhibits no edema or deformity.   Neurological: She is alert and oriented to person, place, and time. She displays no tremor. No cranial nerve deficit. Gait normal.   Skin: Skin is warm and dry. No rash noted. She is not diaphoretic. No cyanosis. Nails show no clubbing.   Psychiatric: She has a normal mood and affect. Her speech is normal and behavior is normal. Judgment and thought content normal. Cognition and memory are normal.   Nursing note and vitals reviewed.    Results for orders placed in visit on 03/27/18   XR Knee 3 View Right    Narrative EXAMINATION: XR KNEE 3 VW, RIGHT-03/27/2018:      INDICATION: Pain; M25.561-Pain in right knee.      COMPARISON: NONE.     FINDINGS:   1.  The osseous structures of the right knee are intact. The articular  joint space is smooth.  2.  The patella is intact and the patellofemoral space is normal and  well aligned.           Impression Normal right knee series.     D:  03/27/2018  E:  03/27/2018     This report was finalized on 3/27/2018 4:28 PM by Dr. Michel Queen MD.          Assessment/Plan     Problem List Items Addressed This Visit        Other    Depression    Current Assessment & Plan     Psychological condition is improving with treatment.  Continue current treatment regimen.  Psychological condition  will be reassessed at the next regular appointment.           Other Visit Diagnoses     Recurrent right knee instability    -  Primary    Relevant Orders    Ambulatory Referral to Orthopedic Surgery    SOB (shortness of breath) on exertion        Relevant Medications    Tiotropium Bromide Monohydrate  (SPIRIVA RESPIMAT) 1.25 MCG/ACT aerosol solution inhaler    Tobacco abuse        Tobacco abuse counseling            Ready to quit: Yes  Counseling given: Yes      I advised the patient of the risks in continuing to use tobacco, and I provided this patient with smoking cessation educational materials.    During this visit, I spent 3-5 minutes counseling the patient regarding smoking cessation.    Continue use of gum. Discouraged Chantix given underlying mental health issues.      Return in about 2 months (around 6/24/2018).    Kiki Baeza MD    Please note that portions of this note may have been completed with a voice recognition program. Efforts were made to edit dictation, but occasionally words are mistranscribed.

## 2018-07-27 DIAGNOSIS — F33.9 RECURRENT MAJOR DEPRESSIVE DISORDER, REMISSION STATUS UNSPECIFIED (HCC): ICD-10-CM

## 2018-07-27 RX ORDER — FLUOXETINE HYDROCHLORIDE 20 MG/1
CAPSULE ORAL
Qty: 30 CAPSULE | Refills: 0 | Status: SHIPPED | OUTPATIENT
Start: 2018-07-27 | End: 2018-09-04 | Stop reason: SDUPTHER

## 2018-07-31 ENCOUNTER — OFFICE VISIT (OUTPATIENT)
Dept: INTERNAL MEDICINE | Facility: CLINIC | Age: 23
End: 2018-07-31

## 2018-07-31 VITALS
DIASTOLIC BLOOD PRESSURE: 76 MMHG | HEART RATE: 73 BPM | BODY MASS INDEX: 48.82 KG/M2 | TEMPERATURE: 98.7 F | OXYGEN SATURATION: 97 % | SYSTOLIC BLOOD PRESSURE: 120 MMHG | HEIGHT: 65 IN | WEIGHT: 293 LBS

## 2018-07-31 DIAGNOSIS — Z72.820 POOR SLEEP: ICD-10-CM

## 2018-07-31 DIAGNOSIS — N92.6 IRREGULAR MENSES: ICD-10-CM

## 2018-07-31 DIAGNOSIS — R51.9 CHRONIC NONINTRACTABLE HEADACHE, UNSPECIFIED HEADACHE TYPE: ICD-10-CM

## 2018-07-31 DIAGNOSIS — R53.82 CHRONIC FATIGUE: ICD-10-CM

## 2018-07-31 DIAGNOSIS — R11.2 NAUSEA AND VOMITING, INTRACTABILITY OF VOMITING NOT SPECIFIED, UNSPECIFIED VOMITING TYPE: ICD-10-CM

## 2018-07-31 DIAGNOSIS — R06.83 SNORING: Primary | ICD-10-CM

## 2018-07-31 DIAGNOSIS — G89.29 CHRONIC NONINTRACTABLE HEADACHE, UNSPECIFIED HEADACHE TYPE: ICD-10-CM

## 2018-07-31 LAB
B-HCG UR QL: NEGATIVE
INTERNAL NEGATIVE CONTROL: NEGATIVE
INTERNAL POSITIVE CONTROL: POSITIVE
Lab: NORMAL

## 2018-07-31 PROCEDURE — 81025 URINE PREGNANCY TEST: CPT | Performed by: FAMILY MEDICINE

## 2018-07-31 PROCEDURE — 99214 OFFICE O/P EST MOD 30 MIN: CPT | Performed by: FAMILY MEDICINE

## 2018-07-31 RX ORDER — PROMETHAZINE HYDROCHLORIDE 25 MG/1
25 TABLET ORAL EVERY 6 HOURS PRN
Qty: 30 TABLET | Refills: 0 | Status: SHIPPED | OUTPATIENT
Start: 2018-07-31 | End: 2019-05-31

## 2018-08-21 ENCOUNTER — OFFICE VISIT (OUTPATIENT)
Dept: INTERNAL MEDICINE | Facility: CLINIC | Age: 23
End: 2018-08-21

## 2018-08-21 VITALS
SYSTOLIC BLOOD PRESSURE: 115 MMHG | DIASTOLIC BLOOD PRESSURE: 80 MMHG | HEIGHT: 65 IN | OXYGEN SATURATION: 97 % | HEART RATE: 98 BPM | TEMPERATURE: 98.6 F | BODY MASS INDEX: 48.82 KG/M2 | WEIGHT: 293 LBS

## 2018-08-21 DIAGNOSIS — G89.29 CHRONIC NONINTRACTABLE HEADACHE, UNSPECIFIED HEADACHE TYPE: Primary | ICD-10-CM

## 2018-08-21 DIAGNOSIS — R51.9 CHRONIC NONINTRACTABLE HEADACHE, UNSPECIFIED HEADACHE TYPE: Primary | ICD-10-CM

## 2018-08-21 DIAGNOSIS — E66.01 MORBID OBESITY (HCC): ICD-10-CM

## 2018-08-21 DIAGNOSIS — Z72.0 TOBACCO ABUSE: ICD-10-CM

## 2018-08-21 DIAGNOSIS — R06.02 SOB (SHORTNESS OF BREATH) ON EXERTION: ICD-10-CM

## 2018-08-21 DIAGNOSIS — F33.9 RECURRENT MAJOR DEPRESSIVE DISORDER, REMISSION STATUS UNSPECIFIED (HCC): ICD-10-CM

## 2018-08-21 PROCEDURE — 99214 OFFICE O/P EST MOD 30 MIN: CPT | Performed by: FAMILY MEDICINE

## 2018-08-21 RX ORDER — TOPIRAMATE 50 MG/1
50 CAPSULE, EXTENDED RELEASE ORAL DAILY
Qty: 30 CAPSULE | Refills: 3 | Status: SHIPPED | OUTPATIENT
Start: 2018-08-21 | End: 2018-12-05

## 2018-08-21 RX ORDER — ALBUTEROL SULFATE 90 UG/1
2 AEROSOL, METERED RESPIRATORY (INHALATION) EVERY 6 HOURS PRN
Qty: 1 INHALER | Refills: 3 | Status: SHIPPED | OUTPATIENT
Start: 2018-08-21 | End: 2020-12-18

## 2018-08-21 NOTE — PROGRESS NOTES
"Subjective    Gracie Clemente is a 23 y.o. female here for:  Chief Complaint   Patient presents with   • Migraine     3 week follow up for Migraines     History of Present Illness     Migraines improved with taking Topamax bid but she has a hard time remembering the second dose. She also feels mood is more stable on this medicine. She would like to try a once daily form if it's an option. Weight has not decreased with use thus far.    Continues to smoke and wheeze. Spiriva helps with her wheezing. Has tried and failed patches.  also smokes. Has rescue inhaler if she needs it.     The following portions of the patient's history were reviewed and updated as appropriate: allergies, current medications, past family history, past medical history, past social history, past surgical history and problem list.    Review of Systems   Constitutional: Negative for unexpected weight loss.   Respiratory: Positive for shortness of breath and wheezing.    Neurological: Negative for seizures and confusion.       Vitals:    08/21/18 1525   BP: 115/80   Pulse: 98   Temp: 98.6 °F (37 °C)   SpO2: 97%   Weight: (!) 145 kg (319 lb)   Height: 165.1 cm (65\")         Objective   Physical Exam   Constitutional: She is oriented to person, place, and time. Vital signs are normal. She appears well-developed and well-nourished. She is active.  Non-toxic appearance. She does not appear ill. No distress. She is morbidly obese.  HENT:   Head: Normocephalic and atraumatic. Hair is normal.   Right Ear: Hearing and external ear normal.   Left Ear: Hearing and external ear normal.   Nose: Nose normal.   Mouth/Throat: Mucous membranes are not dry.   Eyes: Pupils are equal, round, and reactive to light. EOM and lids are normal. No scleral icterus.   Neck: Neck supple.   Cardiovascular: Normal rate, regular rhythm and normal heart sounds.    No murmur heard.  Pulmonary/Chest: Effort normal. She has no decreased breath sounds. She has " wheezes. She has no rhonchi. She has no rales.   Musculoskeletal: She exhibits no edema or deformity.   Neurological: She is alert and oriented to person, place, and time. She displays no tremor. No cranial nerve deficit. Gait normal.   Skin: Skin is warm and dry. No rash noted. She is not diaphoretic. No cyanosis. Nails show no clubbing.   tattoos   Psychiatric: She has a normal mood and affect. Her speech is normal and behavior is normal. Judgment and thought content normal. Cognition and memory are normal.   Nursing note and vitals reviewed.        Assessment/Plan     Problem List Items Addressed This Visit        Digestive    Morbid obesity (CMS/HCC)    Relevant Medications    Topiramate ER 50 MG capsule sustained-release 24 hr       Other    Mood disorder (CMS/[facility])    Relevant Medications    Topiramate ER 50 MG capsule sustained-release 24 hr      Other Visit Diagnoses     Chronic nonintractable headache, unspecified headache type    -  Primary    Relevant Medications    Topiramate ER 50 MG capsule sustained-release 24 hr    SOB (shortness of breath) on exertion        Relevant Medications    albuterol (PROVENTIL HFA;VENTOLIN HFA) 108 (90 Base) MCG/ACT inhaler    budesonide-formoterol (SYMBICORT) 80-4.5 MCG/ACT inhaler    Tobacco abuse              · Briefly discussed Aimovig if Topamax were to fail  · Continue working on weight, smoking cessation  · Patient's Body mass index is 53.08 kg/m². BMI is above normal parameters. Recommendations include: pharmacological intervention.  · Out of Spiriva samples, discussed Symbicort.    Return in about 6 weeks (around 10/2/2018) for Follow up on current issues.    Kiki Baeza MD

## 2018-08-24 RX ORDER — BUDESONIDE AND FORMOTEROL FUMARATE DIHYDRATE 80; 4.5 UG/1; UG/1
2 AEROSOL RESPIRATORY (INHALATION)
Qty: 2 INHALER | Refills: 0 | COMMUNITY
Start: 2018-08-24 | End: 2019-03-13

## 2018-09-04 DIAGNOSIS — F33.9 RECURRENT MAJOR DEPRESSIVE DISORDER, REMISSION STATUS UNSPECIFIED (HCC): ICD-10-CM

## 2018-09-04 RX ORDER — FLUOXETINE HYDROCHLORIDE 20 MG/1
CAPSULE ORAL
Qty: 30 CAPSULE | Refills: 2 | Status: SHIPPED | OUTPATIENT
Start: 2018-09-04 | End: 2018-12-20 | Stop reason: SDUPTHER

## 2018-11-24 ENCOUNTER — OFFICE VISIT (OUTPATIENT)
Dept: PRIMARY CARE CLINIC | Age: 23
End: 2018-11-24
Payer: COMMERCIAL

## 2018-11-24 VITALS
DIASTOLIC BLOOD PRESSURE: 74 MMHG | OXYGEN SATURATION: 99 % | TEMPERATURE: 98 F | WEIGHT: 293 LBS | HEART RATE: 89 BPM | SYSTOLIC BLOOD PRESSURE: 136 MMHG | HEIGHT: 65 IN | BODY MASS INDEX: 48.82 KG/M2

## 2018-11-24 DIAGNOSIS — J00 ACUTE NASOPHARYNGITIS: Primary | ICD-10-CM

## 2018-11-24 PROCEDURE — 99213 OFFICE O/P EST LOW 20 MIN: CPT | Performed by: NURSE PRACTITIONER

## 2018-11-24 RX ORDER — METHYLPREDNISOLONE SODIUM SUCCINATE 125 MG/2ML
125 INJECTION, POWDER, LYOPHILIZED, FOR SOLUTION INTRAMUSCULAR; INTRAVENOUS ONCE
Status: COMPLETED | OUTPATIENT
Start: 2018-11-24 | End: 2018-11-24

## 2018-11-24 RX ORDER — GUAIFENESIN 600 MG/1
1200 TABLET, EXTENDED RELEASE ORAL 2 TIMES DAILY PRN
COMMUNITY
Start: 2018-11-24 | End: 2019-08-02

## 2018-11-24 RX ORDER — FLUOXETINE HYDROCHLORIDE 20 MG/1
CAPSULE ORAL
COMMUNITY
Start: 2018-09-04 | End: 2019-08-02

## 2018-11-24 RX ORDER — RANITIDINE 300 MG/1
300 TABLET ORAL
COMMUNITY
Start: 2018-02-06 | End: 2019-08-02

## 2018-11-24 RX ORDER — ALBUTEROL SULFATE 90 UG/1
2 AEROSOL, METERED RESPIRATORY (INHALATION)
COMMUNITY
Start: 2018-08-21 | End: 2020-01-02

## 2018-11-24 RX ORDER — TRIAMCINOLONE ACETONIDE 1 MG/G
CREAM TOPICAL
COMMUNITY
Start: 2017-10-04 | End: 2019-08-02

## 2018-11-24 RX ORDER — BROMPHENIRAMINE MALEATE, PSEUDOEPHEDRINE HYDROCHLORIDE, AND DEXTROMETHORPHAN HYDROBROMIDE 2; 30; 10 MG/5ML; MG/5ML; MG/5ML
5 SYRUP ORAL 4 TIMES DAILY PRN
Qty: 1 BOTTLE | Refills: 0 | COMMUNITY
Start: 2018-11-24 | End: 2019-08-02

## 2018-11-24 RX ORDER — TOPIRAMATE 50 MG/1
CAPSULE, EXTENDED RELEASE ORAL
COMMUNITY
Start: 2018-11-19 | End: 2019-09-11 | Stop reason: SDUPTHER

## 2018-11-24 RX ORDER — METHYLPREDNISOLONE 4 MG/1
4 TABLET ORAL SEE ADMIN INSTRUCTIONS
Qty: 1 KIT | Refills: 0 | Status: SHIPPED | OUTPATIENT
Start: 2018-11-24 | End: 2018-11-30

## 2018-11-24 RX ORDER — BUDESONIDE AND FORMOTEROL FUMARATE DIHYDRATE 80; 4.5 UG/1; UG/1
2 AEROSOL RESPIRATORY (INHALATION)
COMMUNITY
Start: 2018-08-24 | End: 2019-08-02

## 2018-11-24 RX ORDER — EPINEPHRINE 0.3 MG/.3ML
INJECTION SUBCUTANEOUS
COMMUNITY
Start: 2018-10-03 | End: 2019-08-02

## 2018-11-24 RX ADMIN — METHYLPREDNISOLONE SODIUM SUCCINATE 125 MG: 125 INJECTION, POWDER, LYOPHILIZED, FOR SOLUTION INTRAMUSCULAR; INTRAVENOUS at 17:47

## 2018-11-24 ASSESSMENT — PATIENT HEALTH QUESTIONNAIRE - PHQ9
SUM OF ALL RESPONSES TO PHQ9 QUESTIONS 1 & 2: 0
SUM OF ALL RESPONSES TO PHQ QUESTIONS 1-9: 0
2. FEELING DOWN, DEPRESSED OR HOPELESS: 0
1. LITTLE INTEREST OR PLEASURE IN DOING THINGS: 0
SUM OF ALL RESPONSES TO PHQ QUESTIONS 1-9: 0

## 2018-11-24 ASSESSMENT — ENCOUNTER SYMPTOMS: COUGH: 1

## 2018-12-05 ENCOUNTER — OFFICE VISIT (OUTPATIENT)
Dept: NEUROLOGY | Facility: CLINIC | Age: 23
End: 2018-12-05

## 2018-12-05 ENCOUNTER — OFFICE VISIT (OUTPATIENT)
Dept: INTERNAL MEDICINE | Facility: CLINIC | Age: 23
End: 2018-12-05

## 2018-12-05 VITALS
BODY MASS INDEX: 48.82 KG/M2 | SYSTOLIC BLOOD PRESSURE: 132 MMHG | DIASTOLIC BLOOD PRESSURE: 80 MMHG | HEART RATE: 66 BPM | OXYGEN SATURATION: 98 % | HEIGHT: 65 IN | TEMPERATURE: 97.1 F | RESPIRATION RATE: 16 BRPM | WEIGHT: 293 LBS

## 2018-12-05 VITALS — HEIGHT: 65 IN | BODY MASS INDEX: 48.82 KG/M2 | WEIGHT: 293 LBS | OXYGEN SATURATION: 98 % | HEART RATE: 95 BPM

## 2018-12-05 DIAGNOSIS — IMO0002 CHRONIC MIGRAINE: ICD-10-CM

## 2018-12-05 DIAGNOSIS — Z12.4 PAP SMEAR FOR CERVICAL CANCER SCREENING: ICD-10-CM

## 2018-12-05 DIAGNOSIS — G43.719 CHRONIC MIGRAINE WITHOUT AURA, INTRACTABLE, WITHOUT STATUS MIGRAINOSUS: ICD-10-CM

## 2018-12-05 DIAGNOSIS — G47.19 EXCESSIVE DAYTIME SLEEPINESS: Primary | ICD-10-CM

## 2018-12-05 DIAGNOSIS — R53.83 FATIGUE, UNSPECIFIED TYPE: ICD-10-CM

## 2018-12-05 DIAGNOSIS — N92.6 ABNORMAL MENSTRUATION: Primary | ICD-10-CM

## 2018-12-05 DIAGNOSIS — K62.5 RECTAL BLEEDING: ICD-10-CM

## 2018-12-05 DIAGNOSIS — G43.119 INTRACTABLE MIGRAINE WITH AURA WITHOUT STATUS MIGRAINOSUS: ICD-10-CM

## 2018-12-05 PROCEDURE — 99214 OFFICE O/P EST MOD 30 MIN: CPT | Performed by: NURSE PRACTITIONER

## 2018-12-05 PROCEDURE — 99214 OFFICE O/P EST MOD 30 MIN: CPT | Performed by: FAMILY MEDICINE

## 2018-12-05 RX ORDER — BUDESONIDE AND FORMOTEROL FUMARATE DIHYDRATE 80; 4.5 UG/1; UG/1
AEROSOL RESPIRATORY (INHALATION)
COMMUNITY
Start: 2018-08-24 | End: 2018-12-05

## 2018-12-05 RX ORDER — FLUOXETINE HYDROCHLORIDE 20 MG/1
CAPSULE ORAL
COMMUNITY
Start: 2018-09-04 | End: 2018-12-05

## 2018-12-05 RX ORDER — TOPIRAMATE 50 MG/1
CAPSULE, EXTENDED RELEASE ORAL
COMMUNITY
Start: 2018-11-19 | End: 2018-12-05

## 2018-12-05 RX ORDER — TRIAMCINOLONE ACETONIDE 1 MG/G
CREAM TOPICAL
COMMUNITY
Start: 2017-10-04 | End: 2018-12-05

## 2018-12-05 RX ORDER — ALBUTEROL SULFATE 90 UG/1
AEROSOL, METERED RESPIRATORY (INHALATION)
COMMUNITY
Start: 2018-08-21 | End: 2018-12-05

## 2018-12-05 RX ORDER — TOPIRAMATE 100 MG/1
100 CAPSULE, EXTENDED RELEASE ORAL DAILY
Qty: 30 CAPSULE | Refills: 4 | Status: SHIPPED | OUTPATIENT
Start: 2018-12-05 | End: 2019-03-13

## 2018-12-05 RX ORDER — EPINEPHRINE 0.3 MG/.3ML
INJECTION SUBCUTANEOUS
COMMUNITY
Start: 2018-10-03 | End: 2020-12-18

## 2018-12-05 NOTE — PROGRESS NOTES
Subjective   Gracie Clemente is a 23 y.o. female     Chief Complaint   Patient presents with   • Sleeping Problem     NP/Pt is here for GABRIELLA. Pt states that both parents have GABRIELLA but states that she does not quit breathing in her sleep. Pt states that she snores but if she turns or raises slightly she will quit snoring.    • Headache     Pt also c/o migraines. Pt states that she is on Topamax with some benefit.        History of Present Illness  Patient is very pleasant 23-year-old female in clinic today to establish with [facility] for possible GABRIELLA states she does have excessive daytime sleepiness as well as noted snoring.  Both patient's parents have sleep apnea.  She does not feel well rested upon waking.    Patient also willing to establish with [facility] for migraines.  She's had migraines for over 5 years they were occurring 20 times or more a month she was placed on Topamax and states that this has helped relieve some of her migraines.  She does complain of a combination pain of throbbing dull sharp pain behind her temples and radiating to the occipital.  She does have nausea with it but no vomiting and she is sensitive to light and sounds and smells which will worsen her migraines.    The following portions of the patient's history were reviewed and updated as appropriate: allergies, current medications, past family history, past medical history, past social history, past surgical history and problem list.    Review of Systems   Constitutional: Negative for chills and fever.   HENT: Negative for congestion, ear pain, hearing loss, rhinorrhea and sore throat.    Eyes: Negative for pain, discharge and redness.   Respiratory: Negative for cough, shortness of breath, wheezing and stridor.    Cardiovascular: Negative for chest pain, palpitations and leg swelling.   Gastrointestinal: Positive for nausea. Negative for abdominal pain, constipation and vomiting.   Endocrine: Negative for cold  "intolerance, heat intolerance and polyphagia.   Genitourinary: Negative for dysuria, flank pain, frequency and urgency.   Musculoskeletal: Negative for joint swelling, myalgias, neck pain and neck stiffness.   Skin: Negative for pallor, rash and wound.   Allergic/Immunologic: Negative for environmental allergies.   Neurological: Positive for headaches. Negative for dizziness, tremors, seizures, syncope, facial asymmetry, speech difficulty, weakness, light-headedness and numbness.   Hematological: Negative for adenopathy.   Psychiatric/Behavioral: Positive for sleep disturbance. Negative for confusion and hallucinations. The patient is not nervous/anxious.        Objective       Vitals:    12/05/18 1326   Pulse: 95   SpO2: 98%   Weight: (!) 146 kg (321 lb)   Height: 165.1 cm (65\")     GENERAL: Patient is pleasant, cooperative, appears to be stated age.  Body habitus is endomorphic.  HEAD:  Head is normocephalic and atraumatic.    NECK: Neck are non-tender without thyromegaly or adenopathy.  Carotic upstrokes are 1+/4.  No cranial or cervical bruits.  The neck is supple with a full range of motion.   CARDIOVASCULAR:  Regular rate and rhythm with normal S1 and S2 without rub or gallop.  RESPIRATORY:  Clear to auscultation without wheezes or crackle   PSYCH:  Higher cortical function/mental status:  The patient is alert.  She  is oriented x3 to time, place and person.  Recent and the remote memory appear normal.  The patient has a good fund of knowledge.  There is no visual or auditory hallucination or suicidal or homicidal ideation.  SPEECH:There is no gross evidence of aphasia, dysarthria or agnosia.      CRANIAL NERVES: Extraocular movements are full and smooth with normal pursuits and saccades.  No nystagmus noted.  The face is symmetric. Tongue midline.   MOTOR: Strength is 5/5 throughout with normal tone and bulk  No involuntary movements noted.    COORDINATION AND GAIT:  The patient walks with a narrow-based " gait.      Results for orders placed or performed in visit on 07/31/18   POC Pregnancy, Urine   Result Value Ref Range    HCG, Urine, QL Negative Negative    Lot Number [id number]     Internal Positive Control Positive     Internal Negative Control Negative          I have personally reviewed the above labs. Pt follows with PCP.    Assessment/Plan   1.  Migraine headaches: Patient was doing well on topiramate once a day but she was still having about 10-15 migraines monthly.  We will increase patient to Trokendi 100 mg daily.    2.  Excessive daytime sleepiness with snoring and a family history of sleep apnea: Schedule patient for sleep study.    I have counseled patient extensively on Sleep Hygiene including regular sleep wake schedule and stimulus control therapy. I have also discussed the importance of weight reduction because 10% reduction in body weight can reduce sleep apnea by 50 %. We have also discussed abstaining from smoking and drinking.  I have explained to patient that obstructive apnea episode is defined as the absence of airflow for at least 10 seconds.  Sleep apnea is usually accompanied by snoring, disturbed sleep, and daytime sleepiness. Patients with micrognathia, retrognathia, enlarged tonsils, tongue enlargement, and acromegaly are especially predisposed to obstructive sleep apnea. Abnormalities or weakness in the muscles can also contribute to obstructive sleep apnea. Obesity can also contribute to sleep apnea.  Sleep apnea can lead to a number of complications, ranging from daytime sleepiness to possible increased risk of cardiovascular risks.    Daytime sleepiness is the most serious. Daytime sleepiness can also increase the risk for accident-related injuries. Several studies have suggested that people with sleep apnea have two to three times as many car accidents, and five to seven times the risk for multiple accidents.    A number of cardiovascular diseases -- including high blood  pressure, heart failure, stroke, and heart arrhythmias -- have an association with obstructive sleep apnea.    Up to a third of patients with heart failure also have sleep apnea. Both central and obstructive sleep apnea are linked with heart failure. Obstructive sleep apnea is also noted to be associated with type 2 diabetes according to Dr. Marquez at [facility].  The best treatment for symptomatic obstructive sleep apnea is continuous positive airflow pressure (CPAP). Bilevel positive airway pressure (BPAP) systems may be particularly helpful for patients with coexisting lung disease and those with excessive levels of carbon dioxide.  Other treatment options including UPPP surgery, LAUP surgery, radiofrequency somnoplasty and dental appliances such Adam or Clearway.

## 2018-12-05 NOTE — PROGRESS NOTES
"Subjective    Gracie Clemente is a 23 y.o. female here for:  Chief Complaint   Patient presents with   • Menstrual Problem     week in between period. Only bled \"while standing up\"        History of Present Illness     Having frequent bleeding, periods only a few days apart. Only happens when she's walking around, up and about. Also having anal bleeding.  Patient denies constipation.  Bowel movements can be painful.  No history of colon cancer in first-degree relatives.  Patient has not yet had a Pap smear.  She is sexually active, .    The following portions of the patient's history were reviewed and updated as appropriate: allergies, current medications, past family history, past medical history, past social history, past surgical history and problem list.    Health Maintenance   Topic Date Due   • ANNUAL PHYSICAL  07/07/2000   • MENINGOCOCCAL VACCINE (Normal Risk) (1 - 2-dose series) 07/07/2013   • HEPATITIS A VACCINE ADULT (1 of 2) 07/07/2015   • INFLUENZA VACCINE  08/01/2018   • PNEUMOCOCCAL VACCINE (19-64 MEDIUM RISK) (1 of 1 - PPSV23) 01/01/2020 (Originally 7/7/2016)   • HPV VACCINES (1 - Female 3-dose series) 01/01/2020 (Originally 7/7/2008)   • TDAP/TD VACCINES (1 - Tdap) 01/01/2020 (Originally 7/7/2016)   • PAP SMEAR  12/05/2021       Review of Systems    Vitals:    12/05/18 1207   BP: 132/80   Pulse: 66   Resp: 16   Temp: 97.1 °F (36.2 °C)   TempSrc: Temporal   SpO2: 98%   Weight: (!) 146 kg (321 lb)   Height: 165.1 cm (65\")         Objective   Physical Exam   Constitutional: She is oriented to person, place, and time. Vital signs are normal. She appears well-developed and well-nourished. She is active.  Non-toxic appearance. She does not appear ill. No distress. She is morbidly obese.  HENT:   Head: Normocephalic and atraumatic.   Right Ear: Hearing normal.   Left Ear: Hearing normal.   Mouth/Throat: Mucous membranes are not dry.   Eyes: EOM are normal. No scleral icterus.   Neck: " Phonation normal. Neck supple.   Pulmonary/Chest: Effort normal.   Genitourinary: Vagina normal and cervix normal. Pelvic exam was performed with patient supine. No labial fusion. There is no rash, tenderness, lesion, injury or Bartholin's cyst on the right labia. There is no rash, tenderness, lesion, injury or Bartholin's cyst on the left labia.   Cervix is nulliparous. Cervix does not exhibit motion tenderness, discharge, friability, eversion or cyanosis. Right adnexum displays no mass, no tenderness and no fullness. Left adnexum displays no mass, no tenderness and no fullness. No erythema or bleeding in the vagina. No signs of injury around the vagina.   Genitourinary Comments: Wellington Adames MA, chaperone.  Cervix very posterior and pointed posteriorly, small and somewhat hard to visualize due to body habitus.  Bimanual exam deferred due to body habitus.     Neurological: She is alert and oriented to person, place, and time. She displays no tremor. No cranial nerve deficit.   Skin: Skin is warm. No bruising and no rash noted. She is not diaphoretic. There is pallor.   Psychiatric: She has a normal mood and affect. Her speech is normal and behavior is normal. Judgment and thought content normal. Cognition and memory are normal.   Nursing note and vitals reviewed.        Assessment/Plan     Problem List Items Addressed This Visit     None      Visit Diagnoses     Abnormal menstruation    -  Primary    Relevant Orders    Iron Profile    CBC & Differential    Comprehensive Metabolic Panel    TSH    T4, Free    US Non-ob Transvaginal    Ferritin    Protime-INR    APTT    Rectal bleeding        Relevant Orders    Iron Profile    CBC & Differential    Ambulatory Referral For Screening Colonoscopy    Ferritin    Protime-INR    APTT    Fatigue, unspecified type        Relevant Orders    Iron Profile    CBC & Differential    Comprehensive Metabolic Panel    TSH    T4, Free    Ambulatory Referral For Screening Colonoscopy     Ferritin    Protime-INR    APTT    Pap smear for cervical cancer screening                Kiki Baeza MD

## 2018-12-19 ENCOUNTER — APPOINTMENT (OUTPATIENT)
Dept: ULTRASOUND IMAGING | Facility: HOSPITAL | Age: 23
End: 2018-12-19

## 2018-12-20 DIAGNOSIS — F33.9 RECURRENT MAJOR DEPRESSIVE DISORDER, REMISSION STATUS UNSPECIFIED (HCC): ICD-10-CM

## 2018-12-20 RX ORDER — FLUOXETINE HYDROCHLORIDE 20 MG/1
CAPSULE ORAL
Qty: 30 CAPSULE | Refills: 1 | Status: SHIPPED | OUTPATIENT
Start: 2018-12-20 | End: 2019-01-28 | Stop reason: SDUPTHER

## 2019-01-08 ENCOUNTER — TELEPHONE (OUTPATIENT)
Dept: SURGERY | Facility: CLINIC | Age: 24
End: 2019-01-08

## 2019-01-08 NOTE — TELEPHONE ENCOUNTER
Called patient still no answer,let another message for patient to call office and reschedule appointment. No sow letter being mailed to patient.

## 2019-01-08 NOTE — TELEPHONE ENCOUNTER
Patient no showed for appointment with Dr Morin. Called patient cell phone no answer, left message to call the office.

## 2019-01-28 DIAGNOSIS — F33.9 RECURRENT MAJOR DEPRESSIVE DISORDER, REMISSION STATUS UNSPECIFIED (HCC): ICD-10-CM

## 2019-01-28 DIAGNOSIS — G89.29 CHRONIC NONINTRACTABLE HEADACHE, UNSPECIFIED HEADACHE TYPE: ICD-10-CM

## 2019-01-28 DIAGNOSIS — R51.9 CHRONIC NONINTRACTABLE HEADACHE, UNSPECIFIED HEADACHE TYPE: ICD-10-CM

## 2019-01-28 DIAGNOSIS — E66.01 MORBID OBESITY (HCC): ICD-10-CM

## 2019-01-28 RX ORDER — TOPIRAMATE 50 MG/1
CAPSULE, EXTENDED RELEASE ORAL
Qty: 30 EACH | Refills: 5 | Status: SHIPPED | OUTPATIENT
Start: 2019-01-28 | End: 2020-06-07

## 2019-01-28 RX ORDER — FLUOXETINE HYDROCHLORIDE 20 MG/1
CAPSULE ORAL
Qty: 30 CAPSULE | Refills: 5 | Status: SHIPPED | OUTPATIENT
Start: 2019-01-28

## 2019-03-08 ENCOUNTER — OFFICE VISIT (OUTPATIENT)
Dept: INTERNAL MEDICINE | Facility: CLINIC | Age: 24
End: 2019-03-08

## 2019-03-08 VITALS
OXYGEN SATURATION: 98 % | DIASTOLIC BLOOD PRESSURE: 78 MMHG | WEIGHT: 293 LBS | HEART RATE: 96 BPM | SYSTOLIC BLOOD PRESSURE: 126 MMHG | HEIGHT: 65 IN | BODY MASS INDEX: 48.82 KG/M2 | TEMPERATURE: 98.7 F

## 2019-03-08 DIAGNOSIS — J06.9 ACUTE URI: Primary | ICD-10-CM

## 2019-03-08 PROCEDURE — 99213 OFFICE O/P EST LOW 20 MIN: CPT | Performed by: PHYSICIAN ASSISTANT

## 2019-03-08 RX ORDER — BROMPHENIRAMINE MALEATE, PSEUDOEPHEDRINE HYDROCHLORIDE, AND DEXTROMETHORPHAN HYDROBROMIDE 2; 30; 10 MG/5ML; MG/5ML; MG/5ML
10 SYRUP ORAL 4 TIMES DAILY PRN
Qty: 200 ML | Refills: 0 | Status: SHIPPED | OUTPATIENT
Start: 2019-03-08 | End: 2019-04-04

## 2019-03-08 RX ORDER — AZITHROMYCIN 250 MG/1
TABLET, FILM COATED ORAL
Qty: 6 TABLET | Refills: 0 | Status: SHIPPED | OUTPATIENT
Start: 2019-03-08 | End: 2019-04-04

## 2019-03-08 NOTE — PROGRESS NOTES
Gracie Clemente is a 23 y.o. female.     Subjective   History of Present Illness   Here today with concern of a few days of sneezing, cough and congestion with a nose bleed last night. No fever, chills, sore throat or ear pain. She has had some headaches and little body aches. She is not taking anything for symptoms. No SOA, chest tightness or wheezing.           The following portions of the patient's history were reviewed and updated as appropriate: allergies, current medications, past family history, past medical history, past social history, past surgical history and problem list.    Review of Systems   Constitutional: Negative for appetite change, chills, fatigue, fever and unexpected weight change.   HENT: Positive for congestion, nosebleeds, postnasal drip and rhinorrhea. Negative for ear pain, sinus pressure, sinus pain and sore throat.    Respiratory: Positive for cough. Negative for chest tightness, shortness of breath and wheezing.    Cardiovascular: Negative for chest pain, palpitations and leg swelling.   Gastrointestinal: Negative for abdominal pain, diarrhea, nausea and vomiting.   Musculoskeletal: Positive for arthralgias. Negative for neck pain and neck stiffness.   Skin: Negative for rash.   Neurological: Positive for headaches. Negative for weakness.   Psychiatric/Behavioral: Negative for dysphoric mood, self-injury and suicidal ideas. The patient is not nervous/anxious.          Objective    Physical Exam   Constitutional: She is oriented to person, place, and time. She appears well-developed and well-nourished. No distress.   HENT:   Head: Normocephalic and atraumatic.   Right Ear: External ear normal.   Left Ear: External ear normal.   Nose: Nose normal.   Mouth/Throat: No oropharyngeal exudate.   Mild OP erythema.    Eyes: Conjunctivae and EOM are normal. Pupils are equal, round, and reactive to light.   Neck: Normal range of motion. Neck supple. No thyromegaly present.  "  Cardiovascular: Normal rate, regular rhythm and normal heart sounds. Exam reveals no gallop and no friction rub.   No murmur heard.  Pulmonary/Chest: Effort normal and breath sounds normal. No respiratory distress. She has no wheezes. She has no rales. She exhibits no tenderness.   Abdominal: Soft. Bowel sounds are normal. She exhibits no distension. There is no tenderness. There is no rebound and no guarding.   Musculoskeletal: Normal range of motion. She exhibits no tenderness.   Lymphadenopathy:     She has cervical adenopathy (mild anterior).   Neurological: She is alert and oriented to person, place, and time. She displays normal reflexes. No cranial nerve deficit or sensory deficit.   Skin: Skin is warm and dry. Capillary refill takes less than 2 seconds. No rash noted. She is not diaphoretic.   Psychiatric: She has a normal mood and affect. Her behavior is normal. Judgment and thought content normal.         /78   Pulse 96   Temp 98.7 °F (37.1 °C)   Ht 165.1 cm (65\")   Wt (!) 150 kg (331 lb)   SpO2 98%   BMI 55.08 kg/m²     Nursing note and vitals reviewed.          Assessment/Plan   Gracie was seen today for uri.    Diagnoses and all orders for this visit:    Acute URI  -     azithromycin (ZITHROMAX Z-DEEPTHI) 250 MG tablet; Take 2 tablets the first day, then 1 tablet daily for 4 days.  -     brompheniramine-pseudoephedrine-DM 30-2-10 MG/5ML syrup; Take 10 mL by mouth 4 (Four) Times a Day As Needed for Congestion, Cough or Allergies.      Increase PO fluid intake. Ibuprofen and/or Tylenol prn for pain and fever control.       Call or RTC if symptoms worsen or persist.              "

## 2019-03-13 ENCOUNTER — OFFICE VISIT (OUTPATIENT)
Dept: INTERNAL MEDICINE | Facility: CLINIC | Age: 24
End: 2019-03-13

## 2019-03-13 ENCOUNTER — APPOINTMENT (OUTPATIENT)
Dept: LAB | Facility: HOSPITAL | Age: 24
End: 2019-03-13

## 2019-03-13 VITALS
DIASTOLIC BLOOD PRESSURE: 84 MMHG | HEART RATE: 87 BPM | SYSTOLIC BLOOD PRESSURE: 126 MMHG | OXYGEN SATURATION: 97 % | BODY MASS INDEX: 48.82 KG/M2 | TEMPERATURE: 97.8 F | WEIGHT: 293 LBS | HEIGHT: 65 IN

## 2019-03-13 DIAGNOSIS — L98.9 SKIN LESION: Primary | ICD-10-CM

## 2019-03-13 DIAGNOSIS — R53.83 FATIGUE, UNSPECIFIED TYPE: ICD-10-CM

## 2019-03-13 LAB
ALBUMIN SERPL-MCNC: 3.9 G/DL (ref 3.5–5)
ALBUMIN/GLOB SERPL: 1.1 G/DL (ref 1–2)
ALP SERPL-CCNC: 72 U/L (ref 38–126)
ALT SERPL W P-5'-P-CCNC: 19 U/L (ref 13–69)
ANION GAP SERPL CALCULATED.3IONS-SCNC: 12.9 MMOL/L (ref 10–20)
APTT PPP: 32.6 SECONDS (ref 24.5–37.2)
AST SERPL-CCNC: 25 U/L (ref 15–46)
BASOPHILS # BLD AUTO: 0.09 10*3/MM3 (ref 0–0.2)
BASOPHILS NFR BLD AUTO: 0.7 % (ref 0–2.5)
BILIRUB SERPL-MCNC: 0.4 MG/DL (ref 0.2–1.3)
BUN BLD-MCNC: 10 MG/DL (ref 7–20)
BUN/CREAT SERPL: 10 (ref 7.1–23.5)
CALCIUM SPEC-SCNC: 8.9 MG/DL (ref 8.4–10.2)
CHLORIDE SERPL-SCNC: 109 MMOL/L (ref 98–107)
CO2 SERPL-SCNC: 24 MMOL/L (ref 26–30)
CREAT BLD-MCNC: 1 MG/DL (ref 0.6–1.3)
DEPRECATED RDW RBC AUTO: 39.8 FL (ref 37–54)
EOSINOPHIL # BLD AUTO: 0.24 10*3/MM3 (ref 0–0.7)
EOSINOPHIL NFR BLD AUTO: 1.9 % (ref 0–7)
ERYTHROCYTE [DISTWIDTH] IN BLOOD BY AUTOMATED COUNT: 12.4 % (ref 11.5–14.5)
FERRITIN SERPL-MCNC: 80.6 NG/ML (ref 6.24–137)
GFR SERPL CREATININE-BSD FRML MDRD: 70 ML/MIN/1.73
GLOBULIN UR ELPH-MCNC: 3.6 GM/DL
GLUCOSE BLD-MCNC: 117 MG/DL (ref 74–98)
HCT VFR BLD AUTO: 44 % (ref 37–47)
HGB BLD-MCNC: 14.4 G/DL (ref 12–16)
IMM GRANULOCYTES # BLD AUTO: 0.04 10*3/MM3 (ref 0–0.06)
IMM GRANULOCYTES NFR BLD AUTO: 0.3 % (ref 0–0.6)
INR PPP: 1.1 (ref 0.9–1.1)
IRON 24H UR-MRATE: 67 MCG/DL (ref 37–181)
IRON SATN MFR SERPL: 23 % (ref 11–46)
LYMPHOCYTES # BLD AUTO: 3.22 10*3/MM3 (ref 0.6–3.4)
LYMPHOCYTES NFR BLD AUTO: 25.5 % (ref 10–50)
MCH RBC QN AUTO: 28.9 PG (ref 27–31)
MCHC RBC AUTO-ENTMCNC: 32.7 G/DL (ref 30–37)
MCV RBC AUTO: 88.4 FL (ref 81–99)
MONOCYTES # BLD AUTO: 0.72 10*3/MM3 (ref 0–0.9)
MONOCYTES NFR BLD AUTO: 5.7 % (ref 0–12)
NEUTROPHILS # BLD AUTO: 8.31 10*3/MM3 (ref 2–6.9)
NEUTROPHILS NFR BLD AUTO: 65.9 % (ref 37–80)
NRBC BLD AUTO-RTO: 0 /100 WBC (ref 0–0)
PLATELET # BLD AUTO: 249 10*3/MM3 (ref 130–400)
PMV BLD AUTO: 11.1 FL (ref 6–12)
POTASSIUM BLD-SCNC: 3.9 MMOL/L (ref 3.5–5.1)
PROT SERPL-MCNC: 7.5 G/DL (ref 6.3–8.2)
PROTHROMBIN TIME: 14.6 SECONDS (ref 12–15.1)
RBC # BLD AUTO: 4.98 10*6/MM3 (ref 4.2–5.4)
SODIUM BLD-SCNC: 142 MMOL/L (ref 137–145)
T4 FREE SERPL-MCNC: 0.88 NG/DL (ref 0.78–2.19)
TIBC SERPL-MCNC: 291 MCG/DL (ref 261–497)
TSH SERPL DL<=0.05 MIU/L-ACNC: 1.78 MIU/ML (ref 0.47–4.68)
WBC NRBC COR # BLD: 12.62 10*3/MM3 (ref 4.8–10.8)

## 2019-03-13 PROCEDURE — 80053 COMPREHEN METABOLIC PANEL: CPT | Performed by: FAMILY MEDICINE

## 2019-03-13 PROCEDURE — 99213 OFFICE O/P EST LOW 20 MIN: CPT | Performed by: PHYSICIAN ASSISTANT

## 2019-03-13 PROCEDURE — 82728 ASSAY OF FERRITIN: CPT | Performed by: FAMILY MEDICINE

## 2019-03-13 PROCEDURE — 85610 PROTHROMBIN TIME: CPT | Performed by: FAMILY MEDICINE

## 2019-03-13 PROCEDURE — 83550 IRON BINDING TEST: CPT | Performed by: FAMILY MEDICINE

## 2019-03-13 PROCEDURE — 85730 THROMBOPLASTIN TIME PARTIAL: CPT | Performed by: FAMILY MEDICINE

## 2019-03-13 PROCEDURE — 83540 ASSAY OF IRON: CPT | Performed by: FAMILY MEDICINE

## 2019-03-13 PROCEDURE — 84439 ASSAY OF FREE THYROXINE: CPT | Performed by: FAMILY MEDICINE

## 2019-03-13 PROCEDURE — 84443 ASSAY THYROID STIM HORMONE: CPT | Performed by: FAMILY MEDICINE

## 2019-03-13 PROCEDURE — 36415 COLL VENOUS BLD VENIPUNCTURE: CPT | Performed by: FAMILY MEDICINE

## 2019-03-13 PROCEDURE — 85025 COMPLETE CBC W/AUTO DIFF WBC: CPT | Performed by: FAMILY MEDICINE

## 2019-03-13 NOTE — PROGRESS NOTES
Chief Complaint   Patient presents with   • Lower Extremity Issue     found a sore on the inside or her right thigh, looks like scab, really red around it    • Fatigue     really tired, weak to her stomach, and dizzy        Subjective   Gracie Clemente is a 23 y.o. female    History of Present Illness    Fatigue:  Patient reports that she noticed the symptoms today.  She had been previously evaluated back in December for similar symptoms.   There were several labs placed, but they have not been collected yet.  She reports that today she felt increased fatigue while she was at work.  She had the feeling she was just going to fall asleep.  She was evaluated for URI symptoms last week and prescribed azithromycin and Bromfed.  She just picked up medications for this today and started this medication today.  She denies any chest pain, shortness of breath.  She had some wheezing last night.  Patient reports that she had sleep study several years ago, but has not had recent follow-up.  Significant other is with her today during evaluation reports that they have been elevated to base further mattress.  She does not snore as long as her head is elevated.    Lesion to the inner thigh: Right thigh lesion appeared 2 days ago.  She reports redness and discomfort.  She does not recall any insect bites.  She has been using triple antibiotic ointment.  It is the same size as when it first appeared.  Denies fever, chills.  She had an episode of vomiting earlier today, has had underlying nausea.      Past Medical History:   Diagnosis Date   • Amenorrhea, secondary    • Anxiety    • Depression    • Gastroesophageal reflux disease    • Headache    • Low back pain    • Nightmares    • Obesity    • Ulnar neuropathy at elbow      History reviewed. No pertinent surgical history.  Family History   Problem Relation Age of Onset   • Depression Mother    • Diabetes Mother    • Hyperlipidemia Mother    • Hypertension Mother    •  Arthritis Mother    • Mental illness Mother    • Obesity Mother    • Arthritis Father    • COPD Father    • Depression Father    • Heart disease Father    • Hypertension Father    • Hyperlipidemia Father    • Stroke Father    • Obesity Father    • Mental illness Father    • Heart attack Father    • Depression Maternal Aunt    • Diabetes Maternal Aunt    • Hypertension Maternal Aunt    • Mental illness Maternal Aunt    • Obesity Maternal Aunt    • Cancer Paternal Aunt    • Diabetes Other         Grandmother    • Mental illness Sister    • Mental illness Sister      Social History     Socioeconomic History   • Marital status:      Spouse name: Not on file   • Number of children: Not on file   • Years of education: Not on file   • Highest education level: Not on file   Social Needs   • Financial resource strain: Not on file   • Food insecurity - worry: Not on file   • Food insecurity - inability: Not on file   • Transportation needs - medical: Not on file   • Transportation needs - non-medical: Not on file   Occupational History   • Not on file   Tobacco Use   • Smoking status: Former Smoker     Packs/day: 0.50     Years: 0.50     Pack years: 0.25     Types: Cigarettes   • Smokeless tobacco: Never Used   Substance and Sexual Activity   • Alcohol use: No   • Drug use: No   • Sexual activity: Yes     Partners: Male     Birth control/protection: None   Other Topics Concern   • Not on file   Social History Narrative   • Not on file     Allergies   Allergen Reactions   • Bee Venom Anaphylaxis   • Sertraline    • Sertraline Hcl Other (See Comments)     Nightmares         Review of Systems   Constitutional: Positive for fatigue. Negative for activity change, appetite change, chills, fever, unexpected weight gain and unexpected weight loss.   HENT: Positive for congestion and postnasal drip.    Respiratory: Negative for apnea, cough, chest tightness and shortness of breath.    Cardiovascular: Negative for chest pain and  leg swelling.   Gastrointestinal: Positive for nausea and vomiting. Negative for abdominal pain, constipation and diarrhea.   Musculoskeletal: Negative for arthralgias, joint swelling and myalgias.   Skin: Positive for skin lesions.   Neurological: Negative for dizziness, weakness, light-headedness and headache.   Psychiatric/Behavioral: Negative for sleep disturbance and depressed mood.     Objective     Vitals:    03/13/19 1659   BP: 126/84   Pulse: 87   Temp: 97.8 °F (36.6 °C)   SpO2: 97%       Physical Exam   Constitutional: She is oriented to person, place, and time. She appears well-developed and well-nourished. No distress.   Obese female, no acute distress.   HENT:   Head: Normocephalic and atraumatic.   Eyes: Conjunctivae and EOM are normal. Pupils are equal, round, and reactive to light.   Neck: Normal range of motion. Neck supple. No JVD present.   Cardiovascular: Normal rate, regular rhythm and normal heart sounds. Exam reveals no gallop and no friction rub.   No murmur heard.  Pulmonary/Chest: Effort normal and breath sounds normal. No respiratory distress. She has no wheezes. She has no rales.   Abdominal: Soft. Bowel sounds are normal. She exhibits no distension. There is no tenderness.   Musculoskeletal: Normal range of motion. She exhibits no edema.   Neurological: She is alert and oriented to person, place, and time.   Skin: Skin is warm and dry. Capillary refill takes less than 2 seconds. She is not diaphoretic.   Lesion noted to the right inner thigh.  There is no fluctuance, exudate, edema.  Some mild erythema and scabbing noted.   Psychiatric: She has a normal mood and affect. Her behavior is normal.   Nursing note and vitals reviewed.      Assessment/Plan     Gracie was seen today for lower extremity issue and fatigue.    Diagnoses and all orders for this visit:    Skin lesion  -     Advised patient to clean the area with soap and water twice daily.  Use Bactroban as directed.  Return to  clinic if lesion is not improving or if it worsens.  Patient verbalizes understanding and is in agreement with plan.  -     mupirocin (BACTROBAN) 2 % ointment; Apply  topically to the appropriate area as directed 2 (Two) Times a Day.    Fatigue, unspecified type        -     Patient was evaluated for similar symptoms in December.  She did not complete the labs placed by Dr. Baeza for that evaluation.  She also has history of excessive daytime sleepiness.  Significant other who is present today denies any periods of apnea or snoring during sleep.  Have advised patient complete labs that are ordered.  Return to clinic for follow-up with Dr. Baeza in approximately 2 weeks.  Return to clinic sooner if symptoms worsen.      Return if symptoms worsen or fail to improve, for Next scheduled follow up.    Lorena Galarza PA-C

## 2019-04-04 ENCOUNTER — OFFICE VISIT (OUTPATIENT)
Dept: INTERNAL MEDICINE | Facility: CLINIC | Age: 24
End: 2019-04-04

## 2019-04-04 VITALS
HEART RATE: 95 BPM | BODY MASS INDEX: 48.82 KG/M2 | DIASTOLIC BLOOD PRESSURE: 74 MMHG | OXYGEN SATURATION: 98 % | WEIGHT: 293 LBS | SYSTOLIC BLOOD PRESSURE: 122 MMHG | HEIGHT: 65 IN | TEMPERATURE: 98.5 F

## 2019-04-04 DIAGNOSIS — J06.9 ACUTE URI: Primary | ICD-10-CM

## 2019-04-04 LAB
EXPIRATION DATE: NORMAL
INTERNAL CONTROL: NORMAL
Lab: NORMAL
S PYO RRNA THROAT QL PROBE: NEGATIVE

## 2019-04-04 PROCEDURE — 99213 OFFICE O/P EST LOW 20 MIN: CPT | Performed by: PHYSICIAN ASSISTANT

## 2019-04-04 PROCEDURE — 87651 STREP A DNA AMP PROBE: CPT | Performed by: PHYSICIAN ASSISTANT

## 2019-04-04 RX ORDER — AMOXICILLIN 875 MG/1
875 TABLET, COATED ORAL 2 TIMES DAILY
Qty: 20 TABLET | Refills: 0 | Status: SHIPPED | OUTPATIENT
Start: 2019-04-04 | End: 2019-05-02

## 2019-04-04 NOTE — PROGRESS NOTES
Gracie Clemente is a 23 y.o. female.     Subjective   History of Present Illness   Here today with concern of 2 days of sore throat, nasal congestion, headache, body aches and little cough. No fever or chills. She is not taking anything for her symptoms.  She did receive an influenza vaccine this season.         The following portions of the patient's history were reviewed and updated as appropriate: allergies, current medications, past family history, past medical history, past social history, past surgical history and problem list.    Review of Systems   Constitutional: Negative for activity change, appetite change, chills, diaphoresis, fatigue, fever and unexpected weight change.   HENT: Positive for congestion, sore throat and trouble swallowing. Negative for drooling, ear discharge, ear pain, hearing loss, nosebleeds, rhinorrhea and sinus pressure.    Respiratory: Positive for cough, shortness of breath and stridor.    Gastrointestinal: Negative for abdominal distention, abdominal pain, anal bleeding, constipation, diarrhea, rectal pain and vomiting.   Musculoskeletal: Positive for arthralgias and neck pain. Negative for back pain, joint swelling, myalgias and neck stiffness.   Skin: Negative for color change, pallor, rash and wound.   Neurological: Positive for headaches.   Psychiatric/Behavioral: Negative for agitation, behavioral problems, dysphoric mood and sleep disturbance. The patient is not hyperactive.          Objective    Physical Exam   Constitutional: She is oriented to person, place, and time. She appears well-developed and well-nourished. No distress.   HENT:   Head: Normocephalic and atraumatic.   Right Ear: External ear normal.   Left Ear: External ear normal.   Nose: Nose normal.   Mouth/Throat: No oropharyngeal exudate.   OP erythema with petechiae.   Eyes: Conjunctivae and EOM are normal. Pupils are equal, round, and reactive to light.   Neck: Normal range of motion. Neck supple.  "  Cardiovascular: Normal rate, regular rhythm and normal heart sounds. Exam reveals no gallop and no friction rub.   No murmur heard.  Pulmonary/Chest: Effort normal and breath sounds normal. No respiratory distress. She has no wheezes. She has no rales.   Abdominal: Soft. Bowel sounds are normal. She exhibits no mass. There is no tenderness. There is no rebound.   Lymphadenopathy:     She has cervical adenopathy (anterior).   Neurological: She is alert and oriented to person, place, and time.   No nuchal rigidity. Negative Kernig sign.    Skin: Skin is warm and dry. Capillary refill takes less than 2 seconds. No rash noted. She is not diaphoretic.   Psychiatric: She has a normal mood and affect. Her behavior is normal. Judgment and thought content normal.   Nursing note and vitals reviewed.        /74   Pulse 95   Temp 98.5 °F (36.9 °C)   Ht 165.1 cm (65\")   Wt (!) 152 kg (335 lb)   SpO2 98%   BMI 55.75 kg/m²     Nursing note and vitals reviewed.        Assessment/Plan   Gracie was seen today for uri and sore throat.    Diagnoses and all orders for this visit:    Acute URI  -     POCT Strep A, molecular - negative. Treating presumptively based on clinical findings and due to poor collection of the sample.   -     amoxicillin (AMOXIL) 875 MG tablet; Take 1 tablet by mouth 2 (Two) Times a Day.      Increase PO fluid intake. Ibuprofen and/or Tylenol prn for pain and fever control.   Begin using new toothbrush after 2 days of antibiotic use.     Call or RTC if symptoms worsen or persist.              "

## 2019-05-02 ENCOUNTER — APPOINTMENT (OUTPATIENT)
Dept: CT IMAGING | Facility: HOSPITAL | Age: 24
End: 2019-05-02

## 2019-05-02 ENCOUNTER — HOSPITAL ENCOUNTER (EMERGENCY)
Facility: HOSPITAL | Age: 24
Discharge: HOME | End: 2019-05-02
Attending: EMERGENCY MEDICINE | Admitting: EMERGENCY MEDICINE

## 2019-05-02 VITALS
RESPIRATION RATE: 18 BRPM | DIASTOLIC BLOOD PRESSURE: 64 MMHG | HEIGHT: 65 IN | SYSTOLIC BLOOD PRESSURE: 111 MMHG | HEART RATE: 77 BPM | WEIGHT: 293 LBS | BODY MASS INDEX: 48.82 KG/M2 | OXYGEN SATURATION: 99 % | TEMPERATURE: 98 F

## 2019-05-02 DIAGNOSIS — K62.5 RECTAL BLEED: Primary | ICD-10-CM

## 2019-05-02 LAB
ALBUMIN SERPL-MCNC: 4 G/DL (ref 3.5–5)
ALBUMIN/GLOB SERPL: 1.2 G/DL (ref 1–2)
ALP SERPL-CCNC: 69 U/L (ref 38–126)
ALT SERPL W P-5'-P-CCNC: 30 U/L (ref 13–69)
ANION GAP SERPL CALCULATED.3IONS-SCNC: 15.8 MMOL/L (ref 10–20)
AST SERPL-CCNC: 27 U/L (ref 15–46)
B-HCG UR QL: NEGATIVE
BACTERIA UR QL AUTO: ABNORMAL /HPF
BASOPHILS # BLD AUTO: 0.11 10*3/MM3 (ref 0–0.2)
BASOPHILS NFR BLD AUTO: 1 % (ref 0–1.5)
BILIRUB SERPL-MCNC: 0.3 MG/DL (ref 0.2–1.3)
BILIRUB UR QL STRIP: NEGATIVE
BUN BLD-MCNC: 9 MG/DL (ref 7–20)
BUN/CREAT SERPL: 12.9 (ref 7.1–23.5)
CALCIUM SPEC-SCNC: 9 MG/DL (ref 8.4–10.2)
CHLORIDE SERPL-SCNC: 106 MMOL/L (ref 98–107)
CLARITY UR: CLEAR
CO2 SERPL-SCNC: 22 MMOL/L (ref 26–30)
COLOR UR: YELLOW
CREAT BLD-MCNC: 0.7 MG/DL (ref 0.6–1.3)
DEPRECATED RDW RBC AUTO: 39.1 FL (ref 37–54)
EOSINOPHIL # BLD AUTO: 0.21 10*3/MM3 (ref 0–0.4)
EOSINOPHIL NFR BLD AUTO: 2 % (ref 0.3–6.2)
ERYTHROCYTE [DISTWIDTH] IN BLOOD BY AUTOMATED COUNT: 12.9 % (ref 12.3–15.4)
GFR SERPL CREATININE-BSD FRML MDRD: 106 ML/MIN/1.73
GLOBULIN UR ELPH-MCNC: 3.4 GM/DL
GLUCOSE BLD-MCNC: 102 MG/DL (ref 74–98)
GLUCOSE UR STRIP-MCNC: NEGATIVE MG/DL
HCT VFR BLD AUTO: 42.6 % (ref 34–46.6)
HEMOCCULT STL QL: NEGATIVE
HGB BLD-MCNC: 14.1 G/DL (ref 12–15.9)
HGB UR QL STRIP.AUTO: ABNORMAL
HYALINE CASTS UR QL AUTO: ABNORMAL /LPF
IMM GRANULOCYTES # BLD AUTO: 0.03 10*3/MM3 (ref 0–0.05)
IMM GRANULOCYTES NFR BLD AUTO: 0.3 % (ref 0–0.5)
KETONES UR QL STRIP: NEGATIVE
LEUKOCYTE ESTERASE UR QL STRIP.AUTO: NEGATIVE
LIPASE SERPL-CCNC: 84 U/L (ref 23–300)
LYMPHOCYTES # BLD AUTO: 2.89 10*3/MM3 (ref 0.7–3.1)
LYMPHOCYTES NFR BLD AUTO: 27.1 % (ref 19.6–45.3)
MCH RBC QN AUTO: 28.1 PG (ref 26.6–33)
MCHC RBC AUTO-ENTMCNC: 33.1 G/DL (ref 31.5–35.7)
MCV RBC AUTO: 85 FL (ref 79–97)
MONOCYTES # BLD AUTO: 0.82 10*3/MM3 (ref 0.1–0.9)
MONOCYTES NFR BLD AUTO: 7.7 % (ref 5–12)
NEUTROPHILS # BLD AUTO: 6.61 10*3/MM3 (ref 1.7–7)
NEUTROPHILS NFR BLD AUTO: 61.9 % (ref 42.7–76)
NITRITE UR QL STRIP: NEGATIVE
NRBC BLD AUTO-RTO: 0 /100 WBC (ref 0–0.2)
PH UR STRIP.AUTO: 7.5 [PH] (ref 5–8)
PLATELET # BLD AUTO: 233 10*3/MM3 (ref 140–450)
PMV BLD AUTO: 11.7 FL (ref 6–12)
POTASSIUM BLD-SCNC: 3.8 MMOL/L (ref 3.5–5.1)
PROT SERPL-MCNC: 7.4 G/DL (ref 6.3–8.2)
PROT UR QL STRIP: NEGATIVE
RBC # BLD AUTO: 5.01 10*6/MM3 (ref 3.77–5.28)
RBC # UR: ABNORMAL /HPF
REF LAB TEST METHOD: ABNORMAL
SODIUM BLD-SCNC: 140 MMOL/L (ref 137–145)
SP GR UR STRIP: 1.02 (ref 1–1.03)
SQUAMOUS #/AREA URNS HPF: ABNORMAL /HPF
UROBILINOGEN UR QL STRIP: ABNORMAL
WBC NRBC COR # BLD: 10.67 10*3/MM3 (ref 3.4–10.8)
WBC UR QL AUTO: ABNORMAL /HPF

## 2019-05-02 PROCEDURE — 36415 COLL VENOUS BLD VENIPUNCTURE: CPT

## 2019-05-02 PROCEDURE — 82272 OCCULT BLD FECES 1-3 TESTS: CPT | Performed by: PHYSICIAN ASSISTANT

## 2019-05-02 PROCEDURE — 99283 EMERGENCY DEPT VISIT LOW MDM: CPT

## 2019-05-02 PROCEDURE — 80053 COMPREHEN METABOLIC PANEL: CPT | Performed by: PHYSICIAN ASSISTANT

## 2019-05-02 PROCEDURE — 81001 URINALYSIS AUTO W/SCOPE: CPT | Performed by: PHYSICIAN ASSISTANT

## 2019-05-02 PROCEDURE — 74176 CT ABD & PELVIS W/O CONTRAST: CPT

## 2019-05-02 PROCEDURE — 83690 ASSAY OF LIPASE: CPT | Performed by: PHYSICIAN ASSISTANT

## 2019-05-02 PROCEDURE — 81025 URINE PREGNANCY TEST: CPT | Performed by: PHYSICIAN ASSISTANT

## 2019-05-02 PROCEDURE — 85025 COMPLETE CBC W/AUTO DIFF WBC: CPT | Performed by: PHYSICIAN ASSISTANT

## 2019-05-02 RX ORDER — ONDANSETRON 4 MG/1
4 TABLET, ORALLY DISINTEGRATING ORAL ONCE
Status: COMPLETED | OUTPATIENT
Start: 2019-05-02 | End: 2019-05-02

## 2019-05-02 RX ORDER — ONDANSETRON 4 MG/1
4 TABLET, ORALLY DISINTEGRATING ORAL EVERY 8 HOURS PRN
Qty: 8 TABLET | Refills: 0 | Status: SHIPPED | OUTPATIENT
Start: 2019-05-02 | End: 2019-05-17

## 2019-05-02 RX ADMIN — ONDANSETRON 4 MG: 4 TABLET, ORALLY DISINTEGRATING ORAL at 19:54

## 2019-05-02 NOTE — ED PROVIDER NOTES
Subjective   The patient is here with complaint of some blood when she wiped rectal area some yesterday and a couple of days prior no fevers chills she did have some nausea vomiting yesterday intermittently, no sharp abdominal pains reported no fevers chills, patient states she was told she would probably need a colonoscopy for similar symptoms such as this that occurred over 6 7 months ago.  Denies pregnancy no other systemic complaints presents here for further evaluation        History provided by:  Patient and spouse      Review of Systems   Constitutional: Negative.    HENT: Negative.    Eyes: Negative.    Respiratory: Negative.    Cardiovascular: Negative.    Gastrointestinal: Positive for blood in stool, nausea and vomiting.   Genitourinary: Negative.    Musculoskeletal: Negative.    Skin: Negative.    Neurological: Negative.    Psychiatric/Behavioral: The patient is nervous/anxious.    All other systems reviewed and are negative.      Past Medical History:   Diagnosis Date   • Amenorrhea, secondary    • Anxiety    • Depression    • Gastroesophageal reflux disease    • Headache    • Low back pain    • Nightmares    • Obesity    • Ulnar neuropathy at elbow        Allergies   Allergen Reactions   • Bee Venom Anaphylaxis   • Sertraline    • Sertraline Hcl Other (See Comments)     Nightmares       History reviewed. No pertinent surgical history.    Family History   Problem Relation Age of Onset   • Depression Mother    • Diabetes Mother    • Hyperlipidemia Mother    • Hypertension Mother    • Arthritis Mother    • Mental illness Mother    • Obesity Mother    • Arthritis Father    • COPD Father    • Depression Father    • Heart disease Father    • Hypertension Father    • Hyperlipidemia Father    • Stroke Father    • Obesity Father    • Mental illness Father    • Heart attack Father    • Depression Maternal Aunt    • Diabetes Maternal Aunt    • Hypertension Maternal Aunt    • Mental illness Maternal Aunt    •  Obesity Maternal Aunt    • Cancer Paternal Aunt    • Diabetes Other         Grandmother    • Mental illness Sister    • Mental illness Sister        Social History     Socioeconomic History   • Marital status:      Spouse name: Not on file   • Number of children: Not on file   • Years of education: Not on file   • Highest education level: Not on file   Tobacco Use   • Smoking status: Former Smoker     Packs/day: 0.50     Years: 0.50     Pack years: 0.25     Types: Cigarettes   • Smokeless tobacco: Never Used   Substance and Sexual Activity   • Alcohol use: No   • Drug use: No   • Sexual activity: Yes     Partners: Male     Birth control/protection: None           Objective   Physical Exam   Constitutional: She is oriented to person, place, and time. She appears well-developed and well-nourished. No distress.   Afebrile vital signs stable nontoxic well-appearing no acute distress   HENT:   Head: Normocephalic and atraumatic.   Eyes: Conjunctivae and EOM are normal. Pupils are equal, round, and reactive to light.   Neck: Neck supple.   Cardiovascular: Normal rate, regular rhythm and intact distal pulses.   Pulmonary/Chest: Effort normal and breath sounds normal.   Abdominal: Soft. Bowel sounds are normal. There is tenderness.   Mild tenderness suprapubic obese no rebound no guarding   Genitourinary:   Genitourinary Comments: Rectal exam some brown stool is noted no bleeding no melena no fissures or hemorrhoids are noted on exam   Musculoskeletal: Normal range of motion.   Neurological: She is alert and oriented to person, place, and time. No cranial nerve deficit or sensory deficit. She exhibits normal muscle tone. Coordination normal.   Skin: Skin is warm and dry. Capillary refill takes less than 2 seconds. No rash noted. She is not diaphoretic.   Psychiatric: Her behavior is normal. Judgment and thought content normal.   Anxious   Nursing note and vitals reviewed.      Procedures           ED Course  ED  Course as of May 02 2040   Thu May 02, 2019   2031 CT imaging per radiology unremarkable we will plan on discharge home recommend follow-up with PCP follow-up with Dr. Alex Bartholomew... Discussion regarding colonoscopy.... To return to the ER for any worsening, pain fevers or any concerns or problem with follow-up in the next 24 to 48 hours  [SC]   2037 Patient is sitting up playing a video game resting comfortably no distress  [SC]      ED Course User Index  [SC] Nathan Gao PA-C                  MDM  Number of Diagnoses or Management Options  Risk of Complications, Morbidity, and/or Mortality  Presenting problems: moderate  Diagnostic procedures: low  Management options: low          Final diagnoses:   Rectal bleed            Nahtan Gao PA-C  05/02/19 2041

## 2019-05-02 NOTE — ED NOTES
MARCO Gao @[facility] for rectal exam, chaperoned per EDT at this time.      Monica Shore, RN  05/02/19 4710

## 2019-05-03 ENCOUNTER — OFFICE VISIT (OUTPATIENT)
Dept: SURGERY | Facility: CLINIC | Age: 24
End: 2019-05-03

## 2019-05-03 VITALS
WEIGHT: 293 LBS | BODY MASS INDEX: 48.82 KG/M2 | DIASTOLIC BLOOD PRESSURE: 68 MMHG | SYSTOLIC BLOOD PRESSURE: 120 MMHG | HEART RATE: 76 BPM | HEIGHT: 65 IN | OXYGEN SATURATION: 98 % | RESPIRATION RATE: 16 BRPM | TEMPERATURE: 97.4 F

## 2019-05-03 DIAGNOSIS — K62.5 RECTAL BLEED: Primary | ICD-10-CM

## 2019-05-03 PROCEDURE — 99204 OFFICE O/P NEW MOD 45 MIN: CPT | Performed by: SURGERY

## 2019-05-03 RX ORDER — POLYETHYLENE GLYCOL 3350 17 G/17G
238 POWDER, FOR SOLUTION ORAL ONCE
Qty: 14 PACKET | Refills: 0 | Status: SHIPPED | OUTPATIENT
Start: 2019-05-03 | End: 2019-05-03

## 2019-05-03 RX ORDER — BISACODYL 5 MG/1
5 TABLET, DELAYED RELEASE ORAL DAILY
Qty: 4 TABLET | Refills: 0 | Status: SHIPPED | OUTPATIENT
Start: 2019-05-03 | End: 2019-05-31

## 2019-05-03 NOTE — H&P (VIEW-ONLY)
Patient: Gracie Clemente    YOB: 1995    Date: 05/03/2019    Primary Care Provider: Provider, No Known    Chief Complaint   Patient presents with   • Rectal Bleeding   • Nausea       SUBJECTIVE:    History of present illness: Patient with a 6-month history of rectal bleeding.  She states that it is intermittent.  Usually with wiping and on her underwear although recently it has been worse and actually went to the emergency room for further evaluation.  She also complains of burning and stabbing sensation in perianal region.  No specific aggravating or alleviating factors.  She denies any constipation or diarrhea.  She denies any straining.    The following portions of the patient's history were reviewed and updated as appropriate: allergies, current medications, past family history, past medical history, past social history, past surgical history and problem list.       Review of Systems   Constitutional: Negative for chills, fever and unexpected weight change.   HENT: Negative for hearing loss, trouble swallowing and voice change.    Eyes: Negative for visual disturbance.   Respiratory: Negative for apnea, cough, chest tightness, shortness of breath and wheezing.    Cardiovascular: Negative for chest pain, palpitations and leg swelling.   Gastrointestinal: Positive for anal bleeding, nausea and rectal pain. Negative for abdominal distention, abdominal pain, blood in stool, constipation, diarrhea and vomiting.   Endocrine: Negative for cold intolerance and heat intolerance.   Genitourinary: Negative for difficulty urinating, dysuria and flank pain.   Musculoskeletal: Negative for back pain and gait problem.   Skin: Negative for color change, rash and wound.   Neurological: Negative for dizziness, syncope, speech difficulty, weakness, light-headedness, numbness and headaches.   Hematological: Negative for adenopathy. Does not bruise/bleed easily.   Psychiatric/Behavioral: Negative for confusion.  The patient is not nervous/anxious.        Allergies:  Allergies   Allergen Reactions   • Bee Venom Anaphylaxis   • Sertraline    • Sertraline Hcl Other (See Comments)     Nightmares       Medications:    Current Outpatient Medications:   •  albuterol (PROVENTIL HFA;VENTOLIN HFA) 108 (90 Base) MCG/ACT inhaler, Inhale 2 puffs Every 6 (Six) Hours As Needed for Wheezing or Shortness of Air., Disp: 1 inhaler, Rfl: 3  •  FLUoxetine (PROzac) 20 MG capsule, TAKE ONE CAPSULE BY MOUTH EVERY DAY, Disp: 30 capsule, Rfl: 5  •  mupirocin (BACTROBAN) 2 % ointment, Apply  topically to the appropriate area as directed 2 (Two) Times a Day., Disp: 1 each, Rfl: 0  •  promethazine (PHENERGAN) 25 MG tablet, Take 1 tablet by mouth Every 6 (Six) Hours As Needed for Nausea or Vomiting., Disp: 30 tablet, Rfl: 0  •  Tiotropium Bromide Monohydrate (SPIRIVA RESPIMAT) 1.25 MCG/ACT aerosol solution inhaler, Inhale 2 puffs Daily., Disp: 1 inhaler, Rfl: 0  •  Topiramate ER 50 MG capsule extended-release 24 hour sprinkle, TAKE ONE TABLET BY MOUTH EVERY DAY, Disp: 30 each, Rfl: 5  •  triamcinolone (KENALOG) 0.1 % cream, Apply  topically 2 (Two) Times a Day., Disp: 45 g, Rfl: 6  •  EPINEPHrine (EPIPEN) 0.3 MG/0.3ML solution auto-injector injection, , Disp: , Rfl:   •  ondansetron ODT (ZOFRAN-ODT) 4 MG disintegrating tablet, Take 1 tablet by mouth Every 8 (Eight) Hours As Needed for Nausea or Vomiting., Disp: 8 tablet, Rfl: 0  No current facility-administered medications for this visit.     History:  Past Medical History:   Diagnosis Date   • Amenorrhea, secondary    • Anxiety    • Depression    • Gastroesophageal reflux disease    • Headache    • Low back pain    • Nightmares    • Obesity    • Ulnar neuropathy at elbow        History reviewed. No pertinent surgical history.    Family History   Problem Relation Age of Onset   • Depression Mother    • Diabetes Mother    • Hyperlipidemia Mother    • Hypertension Mother    • Arthritis Mother    • Mental  "illness Mother    • Obesity Mother    • Arthritis Father    • COPD Father    • Depression Father    • Heart disease Father    • Hypertension Father    • Hyperlipidemia Father    • Stroke Father    • Obesity Father    • Mental illness Father    • Heart attack Father    • Depression Maternal Aunt    • Diabetes Maternal Aunt    • Hypertension Maternal Aunt    • Mental illness Maternal Aunt    • Obesity Maternal Aunt    • Cancer Paternal Aunt    • Diabetes Other         Grandmother    • Mental illness Sister    • Mental illness Sister        Social History     Tobacco Use   • Smoking status: Former Smoker     Packs/day: 0.50     Years: 0.50     Pack years: 0.25     Types: Cigarettes   • Smokeless tobacco: Never Used   Substance Use Topics   • Alcohol use: No   • Drug use: No        OBJECTIVE:    Vital Signs:   Vitals:    05/03/19 1109   BP: 120/68   Pulse: 76   Resp: 16   Temp: 97.4 °F (36.3 °C)   TempSrc: Temporal   SpO2: 98%   Weight: (!) 150 kg (330 lb)   Height: 165.1 cm (65\")       Physical Exam:   General Appearance:    Alert, cooperative, in no acute distress   Head:    Normocephalic, without obvious abnormality, atraumatic   Eyes:            Normal.  No scleral icterus.  PERRLA    Lungs:     Clear to auscultation,respirations regular, even and                  unlabored    Heart:    Regular rhythm and normal rate, normal S1 and S2, no            murmur   Abdomen:     Normal bowel sounds, no masses, no organomegaly, soft        non-tender, non-distended, no guarding, obese   Extremities:   Moves all extremities well, no edema, no cyanosis, no             redness   Skin:   No bleeding, bruising or rash   Neurologic:   Normal without gross deficits.   Psychiatric: No evidence of depression or anxiety   Anal exam: Anal exam rather normal.  Digital examination normal.  Heme-negative.       Results Review:   I reviewed the patient's new clinical results.  Labs and CT from yesterday were reviewed.  I also reviewed the " CT including the films and agree with the interpretation     ASSESSMENT/PLAN:    1. Rectal bleed        I explained to the patient that her most likely source of bleeding or internal hemorrhoids.  I, however, recommend a colonoscopy to rule out other etiologies.  I explained the procedure to the patient as well as the risks of bleeding and perforation and they understand the ramifications of these potential complications and they wish to proceed with further work-up.  At the same time she has agreed to proceed with hemorrhoid banding if she has a larger internal hemorrhoids.  She also understands the risks of this which include bleeding and infection including severe infections.  She wishes to proceed.    Electronically signed by Alex Bratholomew MD  05/03/19      .      Portions of this note have been scribed for Alex Bartholomew MD by Jazmin Sullivan. 5/3/2019  11:51 AM

## 2019-05-03 NOTE — PROGRESS NOTES
Patient: Gracie Clemente    YOB: 1995    Date: 05/03/2019    Primary Care Provider: Provider, No Known    Chief Complaint   Patient presents with   • Rectal Bleeding   • Nausea       SUBJECTIVE:    History of present illness: Patient with a 6-month history of rectal bleeding.  She states that it is intermittent.  Usually with wiping and on her underwear although recently it has been worse and actually went to the emergency room for further evaluation.  She also complains of burning and stabbing sensation in perianal region.  No specific aggravating or alleviating factors.  She denies any constipation or diarrhea.  She denies any straining.    The following portions of the patient's history were reviewed and updated as appropriate: allergies, current medications, past family history, past medical history, past social history, past surgical history and problem list.       Review of Systems   Constitutional: Negative for chills, fever and unexpected weight change.   HENT: Negative for hearing loss, trouble swallowing and voice change.    Eyes: Negative for visual disturbance.   Respiratory: Negative for apnea, cough, chest tightness, shortness of breath and wheezing.    Cardiovascular: Negative for chest pain, palpitations and leg swelling.   Gastrointestinal: Positive for anal bleeding, nausea and rectal pain. Negative for abdominal distention, abdominal pain, blood in stool, constipation, diarrhea and vomiting.   Endocrine: Negative for cold intolerance and heat intolerance.   Genitourinary: Negative for difficulty urinating, dysuria and flank pain.   Musculoskeletal: Negative for back pain and gait problem.   Skin: Negative for color change, rash and wound.   Neurological: Negative for dizziness, syncope, speech difficulty, weakness, light-headedness, numbness and headaches.   Hematological: Negative for adenopathy. Does not bruise/bleed easily.   Psychiatric/Behavioral: Negative for confusion.  The patient is not nervous/anxious.        Allergies:  Allergies   Allergen Reactions   • Bee Venom Anaphylaxis   • Sertraline    • Sertraline Hcl Other (See Comments)     Nightmares       Medications:    Current Outpatient Medications:   •  albuterol (PROVENTIL HFA;VENTOLIN HFA) 108 (90 Base) MCG/ACT inhaler, Inhale 2 puffs Every 6 (Six) Hours As Needed for Wheezing or Shortness of Air., Disp: 1 inhaler, Rfl: 3  •  FLUoxetine (PROzac) 20 MG capsule, TAKE ONE CAPSULE BY MOUTH EVERY DAY, Disp: 30 capsule, Rfl: 5  •  mupirocin (BACTROBAN) 2 % ointment, Apply  topically to the appropriate area as directed 2 (Two) Times a Day., Disp: 1 each, Rfl: 0  •  promethazine (PHENERGAN) 25 MG tablet, Take 1 tablet by mouth Every 6 (Six) Hours As Needed for Nausea or Vomiting., Disp: 30 tablet, Rfl: 0  •  Tiotropium Bromide Monohydrate (SPIRIVA RESPIMAT) 1.25 MCG/ACT aerosol solution inhaler, Inhale 2 puffs Daily., Disp: 1 inhaler, Rfl: 0  •  Topiramate ER 50 MG capsule extended-release 24 hour sprinkle, TAKE ONE TABLET BY MOUTH EVERY DAY, Disp: 30 each, Rfl: 5  •  triamcinolone (KENALOG) 0.1 % cream, Apply  topically 2 (Two) Times a Day., Disp: 45 g, Rfl: 6  •  EPINEPHrine (EPIPEN) 0.3 MG/0.3ML solution auto-injector injection, , Disp: , Rfl:   •  ondansetron ODT (ZOFRAN-ODT) 4 MG disintegrating tablet, Take 1 tablet by mouth Every 8 (Eight) Hours As Needed for Nausea or Vomiting., Disp: 8 tablet, Rfl: 0  No current facility-administered medications for this visit.     History:  Past Medical History:   Diagnosis Date   • Amenorrhea, secondary    • Anxiety    • Depression    • Gastroesophageal reflux disease    • Headache    • Low back pain    • Nightmares    • Obesity    • Ulnar neuropathy at elbow        History reviewed. No pertinent surgical history.    Family History   Problem Relation Age of Onset   • Depression Mother    • Diabetes Mother    • Hyperlipidemia Mother    • Hypertension Mother    • Arthritis Mother    • Mental  "illness Mother    • Obesity Mother    • Arthritis Father    • COPD Father    • Depression Father    • Heart disease Father    • Hypertension Father    • Hyperlipidemia Father    • Stroke Father    • Obesity Father    • Mental illness Father    • Heart attack Father    • Depression Maternal Aunt    • Diabetes Maternal Aunt    • Hypertension Maternal Aunt    • Mental illness Maternal Aunt    • Obesity Maternal Aunt    • Cancer Paternal Aunt    • Diabetes Other         Grandmother    • Mental illness Sister    • Mental illness Sister        Social History     Tobacco Use   • Smoking status: Former Smoker     Packs/day: 0.50     Years: 0.50     Pack years: 0.25     Types: Cigarettes   • Smokeless tobacco: Never Used   Substance Use Topics   • Alcohol use: No   • Drug use: No        OBJECTIVE:    Vital Signs:   Vitals:    05/03/19 1109   BP: 120/68   Pulse: 76   Resp: 16   Temp: 97.4 °F (36.3 °C)   TempSrc: Temporal   SpO2: 98%   Weight: (!) 150 kg (330 lb)   Height: 165.1 cm (65\")       Physical Exam:   General Appearance:    Alert, cooperative, in no acute distress   Head:    Normocephalic, without obvious abnormality, atraumatic   Eyes:            Normal.  No scleral icterus.  PERRLA    Lungs:     Clear to auscultation,respirations regular, even and                  unlabored    Heart:    Regular rhythm and normal rate, normal S1 and S2, no            murmur   Abdomen:     Normal bowel sounds, no masses, no organomegaly, soft        non-tender, non-distended, no guarding, obese   Extremities:   Moves all extremities well, no edema, no cyanosis, no             redness   Skin:   No bleeding, bruising or rash   Neurologic:   Normal without gross deficits.   Psychiatric: No evidence of depression or anxiety   Anal exam: Anal exam rather normal.  Digital examination normal.  Heme-negative.       Results Review:   I reviewed the patient's new clinical results.  Labs and CT from yesterday were reviewed.  I also reviewed the " CT including the films and agree with the interpretation     ASSESSMENT/PLAN:    1. Rectal bleed        I explained to the patient that her most likely source of bleeding or internal hemorrhoids.  I, however, recommend a colonoscopy to rule out other etiologies.  I explained the procedure to the patient as well as the risks of bleeding and perforation and they understand the ramifications of these potential complications and they wish to proceed with further work-up.  At the same time she has agreed to proceed with hemorrhoid banding if she has a larger internal hemorrhoids.  She also understands the risks of this which include bleeding and infection including severe infections.  She wishes to proceed.    Electronically signed by Alex Bartholomew MD  05/03/19      .      Portions of this note have been scribed for Alex Bartholomew MD by Jazmin Sullivan. 5/3/2019  11:51 AM

## 2019-05-06 PROBLEM — K62.5 RECTAL BLEED: Status: ACTIVE | Noted: 2019-05-06

## 2019-05-07 ENCOUNTER — TELEPHONE (OUTPATIENT)
Dept: SURGERY | Facility: CLINIC | Age: 24
End: 2019-05-07

## 2019-05-08 ENCOUNTER — HOSPITAL ENCOUNTER (OUTPATIENT)
Facility: HOSPITAL | Age: 24
Setting detail: HOSPITAL OUTPATIENT SURGERY
Discharge: HOME | End: 2019-05-08
Attending: SURGERY | Admitting: SURGERY

## 2019-05-08 ENCOUNTER — ANESTHESIA (OUTPATIENT)
Dept: GASTROENTEROLOGY | Facility: HOSPITAL | Age: 24
End: 2019-05-08

## 2019-05-08 ENCOUNTER — ANESTHESIA EVENT (OUTPATIENT)
Dept: GASTROENTEROLOGY | Facility: HOSPITAL | Age: 24
End: 2019-05-08

## 2019-05-08 VITALS
SYSTOLIC BLOOD PRESSURE: 130 MMHG | DIASTOLIC BLOOD PRESSURE: 79 MMHG | HEART RATE: 69 BPM | TEMPERATURE: 98.1 F | OXYGEN SATURATION: 99 % | WEIGHT: 293 LBS | HEIGHT: 65 IN | BODY MASS INDEX: 48.82 KG/M2 | RESPIRATION RATE: 18 BRPM

## 2019-05-08 PROCEDURE — 25010000002 PROPOFOL 200 MG/20ML EMULSION: Performed by: NURSE ANESTHETIST, CERTIFIED REGISTERED

## 2019-05-08 PROCEDURE — 81025 URINE PREGNANCY TEST: CPT | Performed by: SURGERY

## 2019-05-08 RX ORDER — PROPOFOL 10 MG/ML
INJECTION, EMULSION INTRAVENOUS AS NEEDED
Status: DISCONTINUED | OUTPATIENT
Start: 2019-05-08 | End: 2019-05-08 | Stop reason: SURG

## 2019-05-08 RX ORDER — SODIUM CHLORIDE 0.9 % (FLUSH) 0.9 %
3 SYRINGE (ML) INJECTION AS NEEDED
Status: DISCONTINUED | OUTPATIENT
Start: 2019-05-08 | End: 2019-05-08 | Stop reason: HOSPADM

## 2019-05-08 RX ORDER — SODIUM CHLORIDE, SODIUM LACTATE, POTASSIUM CHLORIDE, CALCIUM CHLORIDE 600; 310; 30; 20 MG/100ML; MG/100ML; MG/100ML; MG/100ML
1000 INJECTION, SOLUTION INTRAVENOUS CONTINUOUS
Status: DISCONTINUED | OUTPATIENT
Start: 2019-05-08 | End: 2019-05-08 | Stop reason: HOSPADM

## 2019-05-08 RX ORDER — SIMETHICONE 20 MG/.3ML
EMULSION ORAL AS NEEDED
Status: DISCONTINUED | OUTPATIENT
Start: 2019-05-08 | End: 2019-05-08 | Stop reason: HOSPADM

## 2019-05-08 RX ADMIN — PROPOFOL 50 MG: 10 INJECTION, EMULSION INTRAVENOUS at 11:34

## 2019-05-08 RX ADMIN — SODIUM CHLORIDE, POTASSIUM CHLORIDE, SODIUM LACTATE AND CALCIUM CHLORIDE 1000 ML: 600; 310; 30; 20 INJECTION, SOLUTION INTRAVENOUS at 10:21

## 2019-05-08 RX ADMIN — PROPOFOL 100 MG: 10 INJECTION, EMULSION INTRAVENOUS at 11:43

## 2019-05-08 RX ADMIN — PROPOFOL 100 MG: 10 INJECTION, EMULSION INTRAVENOUS at 11:38

## 2019-05-08 NOTE — DISCHARGE INSTRUCTIONS
No pushing, pulling, tugging,  heavy lifting, or strenuous activity.  No major decision making, driving, or drinking alcoholic beverages for 24 hours. ( due to the medications you have  received)  Always use good hand hygiene/washing techniques.  NO driving while taking pain medications.    To assist you in voiding:  Drink plenty of fluids  Listen to running water while attempting to void.    If you are unable to urinate and you have an uncomfortable urge to void or it has been   6 hours since you were discharged, return to the Emergency Room

## 2019-05-08 NOTE — ANESTHESIA POSTPROCEDURE EVALUATION
Patient: Gracie Clemente    Procedure Summary     Date:  05/08/19 Room / Location:  [facility] ENDOSCOPY 3 / [facility] ENDOSCOPY    Anesthesia Start:  1123 Anesthesia Stop:  1147    Procedure:  COLONOSCOPY (N/A Anus) Diagnosis:       Rectal bleed      (Rectal bleed [K62.5])    Surgeon:  Alex Bartholomew MD Provider:  Leonardo Perrin CRNA    Anesthesia Type:  MAC ASA Status:  3          Anesthesia Type: MAC  Last vitals  BP   131/76 (05/08/19 0950)   Temp   97 °F (36.1 °C) (05/08/19 0950)   Pulse   54 (05/08/19 0950)   Resp   16 (05/08/19 0950)     SpO2   99 % (05/08/19 0950)     Post Anesthesia Care and Evaluation    Patient location during evaluation: bedside  Patient participation: complete - patient participated  Level of consciousness: awake and alert  Pain score: 0  Pain management: adequate  Airway patency: patent  Anesthetic complications: No anesthetic complications  PONV Status: none  Cardiovascular status: acceptable  Respiratory status: acceptable  Hydration status: acceptable

## 2019-05-08 NOTE — ANESTHESIA PREPROCEDURE EVALUATION
Anesthesia Evaluation     Patient summary reviewed and Nursing notes reviewed   NPO Solid Status: > 8 hours  NPO Liquid Status: > 4 hours           Airway   Mallampati: II  TM distance: >3 FB  Neck ROM: full  Possible difficult intubation and Large neck circumference  Dental      Pulmonary    (+) a smoker Former, asthma, shortness of breath, sleep apnea, decreased breath sounds,   Cardiovascular     (+) ARRIOLA,   CAD:  inc risk morb obese, zara, ? dm.      Neuro/Psych  (+) headaches, numbness, psychiatric history Anxiety and Depression,     GI/Hepatic/Renal/Endo    (+) obesity, morbid obesity, GERD, GI bleeding,     Musculoskeletal     (+) arthralgias, back pain, chronic pain, myalgias,   Abdominal   (+) obese,    Substance History      OB/GYN          Other                        Anesthesia Plan    ASA 3     MAC   (Risks and benefits discussed including risk of aspiration, recall and dental damage. All patient questions answered. Will continue with POC.)  intravenous induction   Anesthetic plan, all risks, benefits, and alternatives have been provided, discussed and informed consent has been obtained with: patient.

## 2019-05-08 NOTE — ANESTHESIA POSTPROCEDURE EVALUATION
Patient: Gracie Clemente    Procedure Summary     Date:  05/08/19 Room / Location:  [facility] ENDOSCOPY 3 / [facility] ENDOSCOPY    Anesthesia Start:  1123 Anesthesia Stop:  1147    Procedure:  COLONOSCOPY (N/A Anus) Diagnosis:       Rectal bleed      (Rectal bleed [K62.5])    Surgeon:  Alex Bartholomew MD Provider:  Leonardo Perrin CRNA    Anesthesia Type:  MAC ASA Status:  3          Anesthesia Type: MAC  Last vitals  BP   130/79 (05/08/19 1215)   Temp   98.1 °F (36.7 °C) (05/08/19 1155)   Pulse   69 (05/08/19 1215)   Resp   18 (05/08/19 1215)     SpO2   99 % (05/08/19 1215)     Anesthesia Post Evaluation

## 2019-08-02 ENCOUNTER — HOSPITAL ENCOUNTER (EMERGENCY)
Facility: HOSPITAL | Age: 24
Discharge: HOME | End: 2019-08-02
Attending: FAMILY MEDICINE
Payer: COMMERCIAL

## 2019-08-02 VITALS
TEMPERATURE: 98 F | OXYGEN SATURATION: 97 % | HEART RATE: 73 BPM | DIASTOLIC BLOOD PRESSURE: 89 MMHG | RESPIRATION RATE: 16 BRPM | HEIGHT: 65 IN | WEIGHT: 293 LBS | BODY MASS INDEX: 48.82 KG/M2 | SYSTOLIC BLOOD PRESSURE: 123 MMHG

## 2019-08-02 DIAGNOSIS — S83.281A TEAR OF LATERAL CARTILAGE OR MENISCUS OF KNEE, CURRENT, RIGHT, INITIAL ENCOUNTER: Primary | ICD-10-CM

## 2019-08-02 PROCEDURE — 99282 EMERGENCY DEPT VISIT SF MDM: CPT

## 2019-08-02 RX ORDER — DICLOFENAC SODIUM 75 MG/1
75 TABLET, DELAYED RELEASE ORAL 2 TIMES DAILY
Qty: 20 TABLET | Refills: 0 | Status: SHIPPED | OUTPATIENT
Start: 2019-08-02 | End: 2020-01-02

## 2019-08-02 RX ORDER — HYDROCODONE BITARTRATE AND ACETAMINOPHEN 7.5; 325 MG/1; MG/1
1 TABLET ORAL EVERY 6 HOURS PRN
Qty: 12 TABLET | Refills: 0 | Status: SHIPPED | OUTPATIENT
Start: 2019-08-02 | End: 2019-08-05

## 2019-08-02 ASSESSMENT — PAIN DESCRIPTION - PROGRESSION: CLINICAL_PROGRESSION: GRADUALLY WORSENING

## 2019-08-02 ASSESSMENT — PAIN DESCRIPTION - PAIN TYPE
TYPE: ACUTE PAIN
TYPE: ACUTE PAIN;CHRONIC PAIN

## 2019-08-02 ASSESSMENT — PAIN SCALES - GENERAL
PAINLEVEL_OUTOF10: 7
PAINLEVEL_OUTOF10: 7

## 2019-08-02 ASSESSMENT — PAIN DESCRIPTION - ORIENTATION
ORIENTATION: LEFT
ORIENTATION: RIGHT

## 2019-08-02 ASSESSMENT — PAIN DESCRIPTION - FREQUENCY: FREQUENCY: CONTINUOUS

## 2019-08-02 ASSESSMENT — PAIN DESCRIPTION - LOCATION
LOCATION: KNEE
LOCATION: KNEE

## 2019-08-02 NOTE — ED PROVIDER NOTES
person, place, and time. Obese young female in no acute distress. Musculoskeletal:   There is no obvious effusion to the right knee. There is tenderness over the lateral meniscus. There is no tenderness over the medial meniscus. There is no laxity to stress. There is pain with flexion past 90°. Neurological: She is alert and oriented to person, place, and time. Nursing note and vitals reviewed. DIAGNOSTIC RESULTS     EKG: All EKG's are interpreted by the Emergency Department Physician who either signs or Co-signs this chart in the absence of a cardiologist.        RADIOLOGY:   Non-plain film images such as CT, Ultrasound and MRI are read by the radiologist. Plainradiographic images are visualized and preliminarily interpreted by the emergency physician with the below findings:        Interpretation per the Radiologist below, if available at the time of this note:    No orders to display         ED BEDSIDE ULTRASOUND:   Performed by ED Physician - none    LABS:  Labs Reviewed - No data to display    All other labs were within normal range or not returned as of this dictation. EMERGENCY DEPARTMENT COURSE and DIFFERENTIALDIAGNOSIS/MDM:   Vitals:    Vitals:    08/02/19 1823   BP: 126/79   Pulse: 71   Resp: 20   Temp: 98 °F (36.7 °C)   TempSrc: Oral   SpO2: 98%   Weight: (!) 320 lb (145.2 kg)   Height: 5' 5\" (1.651 m)           CRITICALCARE TIME   Total Critical Care time was 0 minutes, excludingseparately reportable procedures. There was a high probabilityof clinically significant/life threatening deterioration in the patient's condition which required my urgent intervention. CONSULTS:  None    PROCEDURES:  None    FINAL IMPRESSION      1.  Tear of lateral cartilage or meniscus of knee, current, right, initial encounter        DISPOSITION/PLAN   DISPOSITION Decision To Discharge 08/02/2019 06:42:32 PM      PATIENT REFERRED TO:  Nir Joel MD  [address] [place] [address]  [id number]  [phone]    Schedule an appointment as soon as possible for a visit       Agueda Vasques MD  [address]  [phone]    Schedule an appointment as soon as possible for a visit         DISCHARGE MEDICATIONS:  New Prescriptions    DICLOFENAC (VOLTAREN) 75 MG EC TABLET    Take 1 tablet by mouth 2 times daily    HYDROCODONE-ACETAMINOPHEN (NORCO) 7.5-325 MG PER TABLET    Take 1 tablet by mouth every 6 hours as needed for Pain for up to 3 days. Intended supply: 3 days.  Take lowest dose possible to manage pain       (Please note that portions ofthis note were completed with a voice recognition program.  Efforts were made to edit the dictations but occasionally words are mis-transcribed.)    Jim Shrestha MD(electronically signed)  Attending Emergency Physician          Jim Shrestha MD  08/02/19 2156

## 2019-08-20 ENCOUNTER — OFFICE VISIT (OUTPATIENT)
Dept: PRIMARY CARE CLINIC | Age: 24
End: 2019-08-20
Payer: COMMERCIAL

## 2019-08-20 VITALS
DIASTOLIC BLOOD PRESSURE: 80 MMHG | WEIGHT: 293 LBS | BODY MASS INDEX: 48.82 KG/M2 | HEART RATE: 114 BPM | OXYGEN SATURATION: 98 % | HEIGHT: 65 IN | SYSTOLIC BLOOD PRESSURE: 110 MMHG

## 2019-08-20 DIAGNOSIS — M25.561 CHRONIC PAIN OF RIGHT KNEE: Primary | ICD-10-CM

## 2019-08-20 DIAGNOSIS — G89.29 CHRONIC PAIN OF RIGHT KNEE: Primary | ICD-10-CM

## 2019-08-20 PROCEDURE — 99213 OFFICE O/P EST LOW 20 MIN: CPT | Performed by: NURSE PRACTITIONER

## 2019-08-20 ASSESSMENT — ENCOUNTER SYMPTOMS
NAUSEA: 0
VOMITING: 0
SORE THROAT: 0
COUGH: 0
SHORTNESS OF BREATH: 0
EYE PAIN: 0
ABDOMINAL PAIN: 0

## 2019-09-03 DIAGNOSIS — G89.29 CHRONIC NONINTRACTABLE HEADACHE, UNSPECIFIED HEADACHE TYPE: ICD-10-CM

## 2019-09-03 DIAGNOSIS — E66.01 MORBID OBESITY (HCC): ICD-10-CM

## 2019-09-03 DIAGNOSIS — R51.9 CHRONIC NONINTRACTABLE HEADACHE, UNSPECIFIED HEADACHE TYPE: ICD-10-CM

## 2019-09-03 DIAGNOSIS — F33.9 RECURRENT MAJOR DEPRESSIVE DISORDER, REMISSION STATUS UNSPECIFIED (HCC): ICD-10-CM

## 2019-09-03 RX ORDER — TOPIRAMATE 50 MG/1
CAPSULE, EXTENDED RELEASE ORAL
Refills: 0 | OUTPATIENT
Start: 2019-09-03

## 2019-09-04 ENCOUNTER — NURSE TRIAGE (OUTPATIENT)
Dept: OTHER | Facility: CLINIC | Age: 24
End: 2019-09-04

## 2019-09-09 ENCOUNTER — OFFICE VISIT (OUTPATIENT)
Dept: ORTHOPEDIC SURGERY | Facility: CLINIC | Age: 24
End: 2019-09-09

## 2019-09-09 VITALS — BODY MASS INDEX: 48.82 KG/M2 | HEIGHT: 65 IN | WEIGHT: 293 LBS | RESPIRATION RATE: 18 BRPM

## 2019-09-09 DIAGNOSIS — M94.261 CHONDROMALACIA OF RIGHT KNEE: ICD-10-CM

## 2019-09-09 DIAGNOSIS — M25.561 RIGHT KNEE PAIN, UNSPECIFIED CHRONICITY: Primary | ICD-10-CM

## 2019-09-09 PROCEDURE — 99203 OFFICE O/P NEW LOW 30 MIN: CPT | Performed by: ORTHOPAEDIC SURGERY

## 2019-09-09 RX ORDER — DICLOFENAC SODIUM 75 MG/1
75 TABLET, DELAYED RELEASE ORAL
COMMUNITY
Start: 2019-08-02 | End: 2019-09-09 | Stop reason: SDUPTHER

## 2019-09-09 RX ORDER — DICLOFENAC SODIUM 75 MG/1
75 TABLET, DELAYED RELEASE ORAL 2 TIMES DAILY
Qty: 60 TABLET | Refills: 1 | Status: SHIPPED | OUTPATIENT
Start: 2019-09-09 | End: 2020-01-21

## 2019-09-09 NOTE — PROGRESS NOTES
Subjective   Patient ID: Gracie Clemente is a 24 y.o. female  Pain of the Right Knee (Patient is here today for right knee pain, she doesn't recall an injury or trauma but states about a year ago she taking pictures of babies in the nursery at the hospital with a a lot of bending when her knee became painful.)             History of Present Illness  24-year-old with anterolateral right knee pain over 1 year ago began when doing repetitive bending and squatting activities, moved home here in [place] to care for her disabled father occasionally has to push them in a wheelchair does not do any climbing crawling activities currently.  Was seen in the ER x-rayed x-rays were negative given a prescription for diclofenac which helped or brace temporarily which also helped.  Denies acute trauma to the knee loss of motion at times she rolls over in bed and it does pop.  Most the pain is anterior in the knee denies calf pain swelling in the ankle hip or back pain.      Review of Systems   Constitutional: Negative for fever.   HENT: Negative for voice change.    Eyes: Negative for visual disturbance.   Respiratory: Negative for shortness of breath.    Cardiovascular: Negative for chest pain.   Gastrointestinal: Negative for abdominal pain.   Genitourinary: Negative for dysuria.   Musculoskeletal: Positive for arthralgias. Negative for gait problem and joint swelling.   Skin: Negative for rash.   Neurological: Negative for speech difficulty.   Hematological: Does not bruise/bleed easily.   Psychiatric/Behavioral: Negative for confusion.       Past Medical History:   Diagnosis Date   • Amenorrhea, secondary    • Anxiety    • Depression    • Gastroesophageal reflux disease    • Headache    • Low back pain    • Nightmares    • Obesity    • Rectal bleeding    • Ulnar neuropathy at elbow         Past Surgical History:   Procedure Laterality Date   • COLONOSCOPY N/A 5/8/2019    Procedure: COLONOSCOPY;  Surgeon: [place]  MD Alex;  Location: [facility] ENDOSCOPY;  Service: Gastroenterology   • WISDOM TOOTH EXTRACTION         Family History   Problem Relation Age of Onset   • Depression Mother    • Diabetes Mother    • Hyperlipidemia Mother    • Hypertension Mother    • Arthritis Mother    • Mental illness Mother    • Obesity Mother    • Arthritis Father    • COPD Father    • Depression Father    • Heart disease Father    • Hypertension Father    • Hyperlipidemia Father    • Stroke Father    • Obesity Father    • Mental illness Father    • Heart attack Father    • Depression Maternal Aunt    • Diabetes Maternal Aunt    • Hypertension Maternal Aunt    • Mental illness Maternal Aunt    • Obesity Maternal Aunt    • Cancer Paternal Aunt    • Diabetes Other         Grandmother    • Mental illness Sister    • Mental illness Sister        Social History     Socioeconomic History   • Marital status:      Spouse name: Not on file   • Number of children: Not on file   • Years of education: Not on file   • Highest education level: Not on file   Tobacco Use   • Smoking status: Former Smoker     Packs/day: 0.50     Years: 0.50     Pack years: 0.25     Types: Cigarettes   • Smokeless tobacco: Never Used   Substance and Sexual Activity   • Alcohol use: No   • Drug use: No   • Sexual activity: Yes     Partners: Male     Birth control/protection: None       I have reviewed all of the above social hx, family hx, surgical hx, medications, allergies & ROS and confirm that it is accurate.    Allergies   Allergen Reactions   • Bee Venom Anaphylaxis   • Sertraline Hcl Other (See Comments)     Nightmares         Current Outpatient Medications:   •  diclofenac (VOLTAREN) 75 MG EC tablet, Take 1 tablet by mouth 2 (Two) Times a Day., Disp: 60 tablet, Rfl: 1  •  albuterol (PROVENTIL HFA;VENTOLIN HFA) 108 (90 Base) MCG/ACT inhaler, Inhale 2 puffs Every 6 (Six) Hours As Needed for Wheezing or Shortness of Air., Disp: 1 inhaler, Rfl: 3  •  EPINEPHrine  "(EPIPEN) 0.3 MG/0.3ML solution auto-injector injection, , Disp: , Rfl:   •  FLUoxetine (PROzac) 20 MG capsule, TAKE ONE CAPSULE BY MOUTH EVERY DAY, Disp: 30 capsule, Rfl: 5  •  lidocaine viscous (XYLOCAINE) 2 % solution, Swish and spit or gargle 10 mL 4 times daily as needed for throat / mouth pain, Disp: 200 mL, Rfl: 0  •  methylPREDNISolone (MEDROL, DEEPTHI,) 4 MG tablet, Take as directed on package instructions., Disp: 21 tablet, Rfl: 0  •  Tiotropium Bromide Monohydrate (SPIRIVA RESPIMAT) 1.25 MCG/ACT aerosol solution inhaler, Inhale 2 puffs Daily., Disp: 1 inhaler, Rfl: 0  •  Topiramate ER 50 MG capsule extended-release 24 hour sprinkle, TAKE ONE TABLET BY MOUTH EVERY DAY, Disp: 30 each, Rfl: 5  •  triamcinolone (KENALOG) 0.1 % cream, Apply  topically 2 (Two) Times a Day., Disp: 45 g, Rfl: 6    Objective   Resp 18   Ht 165.1 cm (65\")   Wt (!) 152 kg (335 lb)   BMI 55.75 kg/m²    Physical Exam  Constitutional: Patient is oriented to person, place, and time. Patient appears well-developed and well-nourished.   HENT:Head: Normocephalic and atraumatic.   Eyes: EOM are normal. Pupils are equal, round, and reactive to light.   Neck: Normal range of motion. Neck supple.   Cardiovascular: Normal rate.    Pulmonary/Chest: Effort normal and breath sounds normal.   Abdominal: Soft.   Neurological: Patient is alert and oriented to person, place, and time.   Skin: Skin is warm and dry.   Psychiatric: Patient has a normal mood and affect.   Nursing note and vitals reviewed.       [unfilled]   Right knee: Positive patellofemoral crepitus positive patellofemoral tenderness snapping over the plical area at the patellofemoral compartment laterally, no true lateral joint line pain no medial joint line pain Jesus sign negative for medial and lateral joint line pain ligament exam unremarkable Lockman sign negative full range of motion slight effusion no atrophy no calf tenderness neurovascularly intact.  Slight hyperextension " noted in slight valgus alignment noted.    Assessment/Plan   Review of Radiographic Studies:    Radiographic images today of affected area I personally viewed and showed no sign of acute fracture or dislocation.      Procedures     Gracie was seen today for pain.    Diagnoses and all orders for this visit:    Right knee pain, unspecified chronicity  -     XR Knee 3 View Right    Chondromalacia of right knee  -     Ambulatory Referral to Physical Therapy Evaluate and treat    Other orders  -     diclofenac (VOLTAREN) 75 MG EC tablet; Take 1 tablet by mouth 2 (Two) Times a Day.       Physical therapy referral given      Recommendations/Plan:   Work/Activity Status: May perform usual activities as tolerated    Patient agreeable to call or return sooner for any concerns.             Impression:  Anterolateral right knee pain probable patellofemoral chondromalacia with plica  Plan:  Therapy referral renewal of diclofenac recheck in 6 weeks if not improving order MR right knee at that time

## 2019-09-11 ENCOUNTER — PATIENT MESSAGE (OUTPATIENT)
Dept: PRIMARY CARE CLINIC | Age: 24
End: 2019-09-11

## 2019-09-11 RX ORDER — TOPIRAMATE 50 MG/1
50 CAPSULE, EXTENDED RELEASE ORAL DAILY
Qty: 30 EACH | Refills: 3 | Status: SHIPPED | OUTPATIENT
Start: 2019-09-11 | End: 2019-11-01 | Stop reason: ALTCHOICE

## 2019-09-12 RX ORDER — FLUOXETINE HYDROCHLORIDE 20 MG/1
20 CAPSULE ORAL DAILY
Qty: 30 CAPSULE | Refills: 5 | Status: SHIPPED | OUTPATIENT
Start: 2019-09-12 | End: 2019-12-12 | Stop reason: SDUPTHER

## 2019-09-18 ENCOUNTER — TELEPHONE (OUTPATIENT)
Dept: PRIMARY CARE CLINIC | Age: 24
End: 2019-09-18

## 2019-09-18 NOTE — TELEPHONE ENCOUNTER
----- Message from JAREN Shea sent at 9/17/2019 10:06 AM EDT -----  Please call patient and inform results of last test were normal

## 2019-11-01 RX ORDER — TOPIRAMATE 50 MG/1
50 TABLET, FILM COATED ORAL 2 TIMES DAILY
Qty: 60 TABLET | Refills: 3 | Status: SHIPPED | OUTPATIENT
Start: 2019-11-01 | End: 2019-11-01 | Stop reason: DRUGHIGH

## 2019-11-01 RX ORDER — TOPIRAMATE 50 MG/1
50 TABLET, FILM COATED ORAL DAILY
Qty: 30 TABLET | Refills: 3 | Status: SHIPPED | OUTPATIENT
Start: 2019-11-01 | End: 2020-09-03 | Stop reason: SDUPTHER

## 2019-11-23 ENCOUNTER — APPOINTMENT (OUTPATIENT)
Dept: GENERAL RADIOLOGY | Facility: HOSPITAL | Age: 24
End: 2019-11-23
Payer: COMMERCIAL

## 2019-11-23 ENCOUNTER — HOSPITAL ENCOUNTER (EMERGENCY)
Facility: HOSPITAL | Age: 24
Discharge: HOME | End: 2019-11-23
Attending: FAMILY MEDICINE
Payer: COMMERCIAL

## 2019-11-23 VITALS
HEART RATE: 85 BPM | SYSTOLIC BLOOD PRESSURE: 117 MMHG | OXYGEN SATURATION: 95 % | HEIGHT: 61 IN | RESPIRATION RATE: 18 BRPM | TEMPERATURE: 97.9 F | WEIGHT: 293 LBS | BODY MASS INDEX: 55.32 KG/M2 | DIASTOLIC BLOOD PRESSURE: 65 MMHG

## 2019-11-23 DIAGNOSIS — M77.50 TENDONITIS OF FOOT: Primary | ICD-10-CM

## 2019-11-23 PROCEDURE — 73630 X-RAY EXAM OF FOOT: CPT

## 2019-11-23 PROCEDURE — 99283 EMERGENCY DEPT VISIT LOW MDM: CPT

## 2019-11-23 RX ORDER — DICLOFENAC SODIUM 75 MG/1
75 TABLET, DELAYED RELEASE ORAL 2 TIMES DAILY
Qty: 20 TABLET | Refills: 0 | Status: SHIPPED | OUTPATIENT
Start: 2019-11-23 | End: 2020-01-02

## 2019-11-23 ASSESSMENT — PAIN DESCRIPTION - PAIN TYPE: TYPE: ACUTE PAIN

## 2019-11-23 ASSESSMENT — PAIN SCALES - GENERAL: PAINLEVEL_OUTOF10: 6

## 2019-11-23 ASSESSMENT — PAIN DESCRIPTION - DESCRIPTORS: DESCRIPTORS: STABBING

## 2019-11-23 ASSESSMENT — PAIN DESCRIPTION - ORIENTATION: ORIENTATION: RIGHT

## 2019-11-23 ASSESSMENT — PAIN DESCRIPTION - LOCATION: LOCATION: FOOT

## 2019-11-25 ENCOUNTER — HOSPITAL ENCOUNTER (OUTPATIENT)
Facility: HOSPITAL | Age: 24
Discharge: HOME | End: 2019-11-25
Payer: COMMERCIAL

## 2019-11-25 ENCOUNTER — OFFICE VISIT (OUTPATIENT)
Dept: PRIMARY CARE CLINIC | Age: 24
End: 2019-11-25
Payer: COMMERCIAL

## 2019-11-25 VITALS
SYSTOLIC BLOOD PRESSURE: 102 MMHG | DIASTOLIC BLOOD PRESSURE: 70 MMHG | WEIGHT: 293 LBS | OXYGEN SATURATION: 98 % | BODY MASS INDEX: 63.3 KG/M2 | HEART RATE: 115 BPM

## 2019-11-25 DIAGNOSIS — M79.671 RIGHT FOOT PAIN: ICD-10-CM

## 2019-11-25 DIAGNOSIS — M79.671 RIGHT FOOT PAIN: Primary | ICD-10-CM

## 2019-11-25 PROCEDURE — 85652 RBC SED RATE AUTOMATED: CPT

## 2019-11-25 PROCEDURE — 85025 COMPLETE CBC W/AUTO DIFF WBC: CPT

## 2019-11-25 PROCEDURE — 86431 RHEUMATOID FACTOR QUANT: CPT

## 2019-11-25 PROCEDURE — 84550 ASSAY OF BLOOD/URIC ACID: CPT

## 2019-11-25 PROCEDURE — 99213 OFFICE O/P EST LOW 20 MIN: CPT | Performed by: NURSE PRACTITIONER

## 2019-11-25 PROCEDURE — 86038 ANTINUCLEAR ANTIBODIES: CPT

## 2019-11-25 ASSESSMENT — ENCOUNTER SYMPTOMS
VOMITING: 0
SHORTNESS OF BREATH: 0
NAUSEA: 0
EYE PAIN: 0
COUGH: 0
SORE THROAT: 0
ABDOMINAL PAIN: 0

## 2019-11-26 LAB
BASOPHILS ABSOLUTE: 0.1 K/UL (ref 0–0.1)
BASOPHILS RELATIVE PERCENT: 0.7 %
EOSINOPHILS ABSOLUTE: 0.2 K/UL (ref 0–0.4)
EOSINOPHILS RELATIVE PERCENT: 1.6 %
HCT VFR BLD CALC: 47.1 % (ref 37–47)
HEMOGLOBIN: 14.8 G/DL (ref 11.5–16.5)
IMMATURE GRANULOCYTES #: 0 K/UL
IMMATURE GRANULOCYTES %: 0.3 % (ref 0–5)
LYMPHOCYTES ABSOLUTE: 2.7 K/UL (ref 1.5–4)
LYMPHOCYTES RELATIVE PERCENT: 25.6 %
MCH RBC QN AUTO: 28.1 PG (ref 27–32)
MCHC RBC AUTO-ENTMCNC: 31.4 G/DL (ref 31–35)
MCV RBC AUTO: 89.5 FL (ref 80–100)
MONOCYTES ABSOLUTE: 0.8 K/UL (ref 0.2–0.8)
MONOCYTES RELATIVE PERCENT: 7 %
NEUTROPHILS ABSOLUTE: 6.9 K/UL (ref 2–7.5)
NEUTROPHILS RELATIVE PERCENT: 64.8 %
PDW BLD-RTO: 11.9 % (ref 11–16)
PLATELET # BLD: 276 K/UL (ref 150–400)
PMV BLD AUTO: 12.6 FL (ref 6–10)
RBC # BLD: 5.26 M/UL (ref 3.8–5.8)
RHEUMATOID FACTOR: <10 IU/ML
SEDIMENTATION RATE, ERYTHROCYTE: 21 MM/HR (ref 0–20)
URIC ACID, SERUM: 6.9 MG/DL (ref 2.5–7.1)
WBC # BLD: 10.7 K/UL (ref 4–11)

## 2019-11-27 ENCOUNTER — TELEPHONE (OUTPATIENT)
Dept: PRIMARY CARE CLINIC | Age: 24
End: 2019-11-27

## 2019-11-27 LAB — ANTI-NUCLEAR ANTIBODY (ANA): NEGATIVE

## 2019-12-12 RX ORDER — FLUOXETINE HYDROCHLORIDE 20 MG/1
20 CAPSULE ORAL DAILY
Qty: 30 CAPSULE | Refills: 5 | Status: SHIPPED | OUTPATIENT
Start: 2019-12-12 | End: 2020-07-30 | Stop reason: SDUPTHER

## 2019-12-30 ENCOUNTER — OFFICE VISIT (OUTPATIENT)
Dept: PRIMARY CARE CLINIC | Age: 24
End: 2019-12-30

## 2019-12-30 VITALS
DIASTOLIC BLOOD PRESSURE: 81 MMHG | BODY MASS INDEX: 62.92 KG/M2 | RESPIRATION RATE: 18 BRPM | TEMPERATURE: 97.9 F | SYSTOLIC BLOOD PRESSURE: 123 MMHG | OXYGEN SATURATION: 100 % | HEART RATE: 86 BPM | WEIGHT: 293 LBS

## 2019-12-30 PROCEDURE — 99213 OFFICE O/P EST LOW 20 MIN: CPT | Performed by: NURSE PRACTITIONER

## 2019-12-30 RX ORDER — AZITHROMYCIN 250 MG/1
250 TABLET, FILM COATED ORAL SEE ADMIN INSTRUCTIONS
Qty: 6 TABLET | Refills: 0 | Status: SHIPPED | OUTPATIENT
Start: 2019-12-30 | End: 2020-01-04

## 2019-12-30 ASSESSMENT — ENCOUNTER SYMPTOMS
ABDOMINAL DISTENTION: 0
DIARRHEA: 0
SINUS PRESSURE: 1
SHORTNESS OF BREATH: 0
NAUSEA: 0
BACK PAIN: 0
CHEST TIGHTNESS: 0
SORE THROAT: 0
COLOR CHANGE: 0
EYES NEGATIVE: 1
ABDOMINAL PAIN: 0
WHEEZING: 0
CONSTIPATION: 0
SINUS PAIN: 1
COUGH: 1

## 2020-01-02 ENCOUNTER — HOSPITAL ENCOUNTER (EMERGENCY)
Facility: HOSPITAL | Age: 25
Discharge: HOME | End: 2020-01-02
Attending: FAMILY MEDICINE

## 2020-01-02 ENCOUNTER — APPOINTMENT (OUTPATIENT)
Dept: ULTRASOUND IMAGING | Facility: HOSPITAL | Age: 25
End: 2020-01-02

## 2020-01-02 VITALS
BODY MASS INDEX: 48.82 KG/M2 | HEIGHT: 65 IN | DIASTOLIC BLOOD PRESSURE: 64 MMHG | RESPIRATION RATE: 18 BRPM | TEMPERATURE: 97.4 F | OXYGEN SATURATION: 99 % | SYSTOLIC BLOOD PRESSURE: 110 MMHG | WEIGHT: 293 LBS | HEART RATE: 84 BPM

## 2020-01-02 LAB
BASOPHILS ABSOLUTE: 0.1 K/UL (ref 0–0.1)
BASOPHILS RELATIVE PERCENT: 0.8 %
BILIRUBIN URINE: NEGATIVE
BLOOD, URINE: ABNORMAL
CLARITY: CLEAR
COLOR: YELLOW
EOSINOPHILS ABSOLUTE: 0.2 K/UL (ref 0–0.4)
EOSINOPHILS RELATIVE PERCENT: 1.5 %
EPITHELIAL CELLS, UA: ABNORMAL /HPF
GLUCOSE URINE: NEGATIVE MG/DL
HCG(URINE) PREGNANCY TEST: NEGATIVE
HCT VFR BLD CALC: 48.4 % (ref 37–47)
HEMOGLOBIN: 15.2 G/DL (ref 11.5–16.5)
IMMATURE GRANULOCYTES #: 0 K/UL
IMMATURE GRANULOCYTES %: 0.3 % (ref 0–5)
KETONES, URINE: NEGATIVE MG/DL
LEUKOCYTE ESTERASE, URINE: NEGATIVE
LYMPHOCYTES ABSOLUTE: 2.3 K/UL (ref 1.5–4)
LYMPHOCYTES RELATIVE PERCENT: 20.2 %
MCH RBC QN AUTO: 27.9 PG (ref 27–32)
MCHC RBC AUTO-ENTMCNC: 31.4 G/DL (ref 31–35)
MCV RBC AUTO: 88.8 FL (ref 80–100)
MICROSCOPIC EXAMINATION: YES
MONOCYTES ABSOLUTE: 0.7 K/UL (ref 0.2–0.8)
MONOCYTES RELATIVE PERCENT: 6.2 %
NEUTROPHILS ABSOLUTE: 8.2 K/UL (ref 2–7.5)
NEUTROPHILS RELATIVE PERCENT: 71 %
NITRITE, URINE: NEGATIVE
PDW BLD-RTO: 12.4 % (ref 11–16)
PH UA: 5 (ref 5–8)
PLATELET # BLD: 249 K/UL (ref 150–400)
PMV BLD AUTO: 11.4 FL (ref 6–10)
PROTEIN UA: NEGATIVE MG/DL
RBC # BLD: 5.45 M/UL (ref 3.8–5.8)
RBC UA: ABNORMAL /HPF (ref 0–2)
SPECIFIC GRAVITY UA: 1.02 (ref 1–1.03)
URINE REFLEX TO CULTURE: ABNORMAL
URINE TYPE: ABNORMAL
UROBILINOGEN, URINE: 0.2 E.U./DL
WBC # BLD: 11.5 K/UL (ref 4–11)
WBC UA: ABNORMAL /HPF (ref 0–5)

## 2020-01-02 PROCEDURE — 36415 COLL VENOUS BLD VENIPUNCTURE: CPT

## 2020-01-02 PROCEDURE — 76830 TRANSVAGINAL US NON-OB: CPT

## 2020-01-02 PROCEDURE — 84703 CHORIONIC GONADOTROPIN ASSAY: CPT

## 2020-01-02 PROCEDURE — 81001 URINALYSIS AUTO W/SCOPE: CPT

## 2020-01-02 PROCEDURE — 99284 EMERGENCY DEPT VISIT MOD MDM: CPT

## 2020-01-02 PROCEDURE — 85025 COMPLETE CBC W/AUTO DIFF WBC: CPT

## 2020-01-02 RX ORDER — MEDROXYPROGESTERONE ACETATE 10 MG/1
10 TABLET ORAL DAILY
Qty: 7 TABLET | Refills: 0 | Status: SHIPPED | OUTPATIENT
Start: 2020-01-02 | End: 2020-06-21

## 2020-01-02 ASSESSMENT — ENCOUNTER SYMPTOMS
VOMITING: 0
NAUSEA: 0

## 2020-01-02 ASSESSMENT — PAIN DESCRIPTION - DESCRIPTORS: DESCRIPTORS: CRAMPING

## 2020-01-02 ASSESSMENT — PAIN DESCRIPTION - FREQUENCY: FREQUENCY: CONTINUOUS

## 2020-01-02 ASSESSMENT — PAIN DESCRIPTION - PROGRESSION: CLINICAL_PROGRESSION: NOT CHANGED

## 2020-01-02 ASSESSMENT — PAIN DESCRIPTION - PAIN TYPE: TYPE: ACUTE PAIN

## 2020-01-02 ASSESSMENT — PAIN DESCRIPTION - LOCATION: LOCATION: ABDOMEN

## 2020-01-02 ASSESSMENT — PAIN SCALES - GENERAL: PAINLEVEL_OUTOF10: 3

## 2020-01-02 NOTE — ED TRIAGE NOTES
Patient states she woke up around 0600 and went to the bathroom where she thought she pushed something out of her vagina. She noticed bright red blood and clots. Has soaked 4 pads since. Has had irregular periods, has not followed up with GYN.

## 2020-01-02 NOTE — ED PROVIDER NOTES
[address] [place] [address] Court  eMERGENCY dEPARTMENT eNCOUnter      Pt Name: Jennifer Alexander  MRN: [id number]  [place] 1995  Date of evaluation: 1/2/2020  Provider: Karolina Lombardo MD    [address] [place] [address]       Chief Complaint   Patient presents with    Vaginal Bleeding         HISTORY OF PRESENT ILLNESS   (Location/Symptom, Timing/Onset, Context/Setting, Quality, Duration, Modifying Factors, Severity)  Note limiting factors. Jennifer Alexander is a 27 y.o. female who presents to the emergency department because she woke up around 6:00 this morning with painless vaginal bleeding. She states she passed 2 clots. Her last period was December 20 and only lasted a day and a half. She has no pregnancy history but states there is a chance she could be pregnant. Nursing Notes were reviewed. REVIEW OF SYSTEMS    (2-9 systems for level 4, 10 or more forlevel 5)     Review of Systems   Constitutional: Negative for chills and fever. Gastrointestinal: Negative for nausea and vomiting. Genitourinary: Positive for vaginal bleeding. Negative for flank pain, frequency, urgency and vaginal pain. Except as noted above the remainder of the review of systems was reviewed and negative.        PAST MEDICAL HISTORY     Past Medical History:   Diagnosis Date    Anemia     Anxiety     Borderline personality disorder ([address]     Depression     GERD (gastroesophageal reflux disease)     Hypothyroidism     OCD (obsessive compulsive disorder)     PCOS (polycystic ovarian syndrome)     Trichotillomania          SURGICAL HISTORY       Past Surgical History:   Procedure Laterality Date    WISDOM TOOTH EXTRACTION           CURRENT MEDICATIONS       Previous Medications    AZITHROMYCIN (ZITHROMAX) 250 MG TABLET    Take 1 tablet by mouth See Admin Instructions for 5 days 500mg on day 1 followed by 250mg on days 2 - 5    FLUOXETINE (PROZAC) 20 MG CAPSULE    Take 1 capsule by mouth daily    TOPIRAMATE [place], [address]   Phone [phone]   CBC WITH AUTO DIFFERENTIAL - Abnormal; Notable for the following components:    WBC 11.5 (*)     Hematocrit 48.4 (*)     MPV 11.4 (*)     Neutrophils Absolute 8.2 (*)     All other components within normal limits    Narrative:     Performed at:  [address] [place] [address] Laboratory  [address] [place] [address],  [place], [address]   Phone [phone] - Abnormal; Notable for the following components:    RBC, UA 20-50 (*)     All other components within normal limits    Narrative:     Performed at:  [address] [place] [address] Laboratory  [address] [place] [address],  [place], [address]   Phone [phone]   PREGNANCY, URINE    Narrative:     Performed at:  [address] [place] [address] Laboratory  [address] [place] [address],  [place], [address]   Phone [phone]       All other labs were within normal range or not returned as of this dictation. EMERGENCY DEPARTMENT COURSE and DIFFERENTIALDIAGNOSIS/MDM:   Vitals:    Vitals:    01/02/20 0856   BP: (!) 149/87   Pulse: 88   Resp: 18   Temp: 97.4 °F (36.3 °C)   TempSrc: Oral   SpO2: 100%   Weight: (!) 331 lb (150.1 kg)   Height: 5' 5\" (1.651 m)           CRITICALCARE TIME   Total Critical Care time was 0 minutes, excludingseparately reportable procedures. There was a high probabilityof clinically significant/life threatening deterioration in the patient's condition which required my urgent intervention.     CONSULTS:  None    PROCEDURES:  None    FINAL IMPRESSION      1. DUB (dysfunctional uterine bleeding)        DISPOSITION/PLAN   DISPOSITION Decision To Discharge 01/02/2020 10:37:50 AM      PATIENT REFERRED TO:  JAREN Clark  [facility]  [phone]    In 1 week        DISCHARGE MEDICATIONS:  New Prescriptions    MEDROXYPROGESTERONE (PROVERA) 10 MG TABLET    Take 1 tablet by mouth daily for 7 days       (Please note that

## 2020-01-16 ENCOUNTER — HOSPITAL ENCOUNTER (EMERGENCY)
Facility: HOSPITAL | Age: 25
Discharge: HOME | End: 2020-01-16
Attending: STUDENT IN AN ORGANIZED HEALTH CARE EDUCATION/TRAINING PROGRAM | Admitting: STUDENT IN AN ORGANIZED HEALTH CARE EDUCATION/TRAINING PROGRAM

## 2020-01-16 VITALS
SYSTOLIC BLOOD PRESSURE: 101 MMHG | DIASTOLIC BLOOD PRESSURE: 88 MMHG | BODY MASS INDEX: 50.02 KG/M2 | HEART RATE: 98 BPM | RESPIRATION RATE: 16 BRPM | HEIGHT: 64 IN | TEMPERATURE: 97.9 F | OXYGEN SATURATION: 100 % | WEIGHT: 293 LBS

## 2020-01-16 DIAGNOSIS — N93.9 ABNORMAL UTERINE BLEEDING: Primary | ICD-10-CM

## 2020-01-16 LAB
HCG SERPL QL: NEGATIVE
HCT VFR BLD AUTO: 46.3 % (ref 34–46.6)
HGB BLD-MCNC: 14.8 G/DL (ref 12–15.9)

## 2020-01-16 PROCEDURE — 36415 COLL VENOUS BLD VENIPUNCTURE: CPT

## 2020-01-16 PROCEDURE — 85014 HEMATOCRIT: CPT | Performed by: STUDENT IN AN ORGANIZED HEALTH CARE EDUCATION/TRAINING PROGRAM

## 2020-01-16 PROCEDURE — 85018 HEMOGLOBIN: CPT | Performed by: STUDENT IN AN ORGANIZED HEALTH CARE EDUCATION/TRAINING PROGRAM

## 2020-01-16 PROCEDURE — 84703 CHORIONIC GONADOTROPIN ASSAY: CPT | Performed by: STUDENT IN AN ORGANIZED HEALTH CARE EDUCATION/TRAINING PROGRAM

## 2020-01-16 PROCEDURE — 99282 EMERGENCY DEPT VISIT SF MDM: CPT

## 2020-01-16 NOTE — ED PROVIDER NOTES
Subjective   24-year-old female presents to the emergency department with complaints of heavy vaginal bleeding.  The patient states her periods are irregular and that she has had periods where she got will not have a period for a month.  Patient states she began bleeding on the third of the month or 13 days ago.  She was seen at the emergency department at [facility] where she had an ultrasound as well as blood work done.  They did prescribe her 1 weeks worth of hormone pills which did stop her bleeding after 2 days.  States her bleeding started again after that.  She states that she was instructed to follow-up with her primary care.  She did see her primary care and was told they would set her up with gynecology.  Patient states she has not heard from anyone.  Last night she began to bleed heavily while she was sleeping and she bled through the mattress pad on her bed.  Her boyfriend brought her here because they were not sure what to do next          Review of Systems   All other systems reviewed and are negative.      Past Medical History:   Diagnosis Date   • Amenorrhea, secondary    • Anxiety    • Depression    • Gastroesophageal reflux disease    • Headache    • Low back pain    • Nightmares    • Obesity    • Rectal bleeding    • Ulnar neuropathy at elbow        Allergies   Allergen Reactions   • Bee Venom Anaphylaxis   • Sertraline Hcl Other (See Comments)     Nightmares       Past Surgical History:   Procedure Laterality Date   • COLONOSCOPY N/A 5/8/2019    Procedure: COLONOSCOPY;  Surgeon: Alex Bartholomew MD;  Location: [facility] ENDOSCOPY;  Service: Gastroenterology   • WISDOM TOOTH EXTRACTION         Family History   Problem Relation Age of Onset   • Depression Mother    • Diabetes Mother    • Hyperlipidemia Mother    • Hypertension Mother    • Arthritis Mother    • Mental illness Mother    • Obesity Mother    • Arthritis Father    • COPD Father    • Depression Father    • Heart disease Father    •  Hypertension Father    • Hyperlipidemia Father    • Stroke Father    • Obesity Father    • Mental illness Father    • Heart attack Father    • Depression Maternal Aunt    • Diabetes Maternal Aunt    • Hypertension Maternal Aunt    • Mental illness Maternal Aunt    • Obesity Maternal Aunt    • Cancer Paternal Aunt    • Diabetes Other         Grandmother    • Mental illness Sister    • Mental illness Sister        Social History     Socioeconomic History   • Marital status:      Spouse name: Not on file   • Number of children: Not on file   • Years of education: Not on file   • Highest education level: Not on file   Tobacco Use   • Smoking status: Current Every Day Smoker     Packs/day: 1.00     Years: 0.50     Pack years: 0.50     Types: Cigarettes   • Smokeless tobacco: Never Used   Substance and Sexual Activity   • Alcohol use: No   • Drug use: No   • Sexual activity: Yes     Partners: Male     Birth control/protection: None           Objective   Physical Exam   Nursing note and vitals reviewed.      GEN: No acute distress, morbidly obese   head: Normocephalic, atraumatic  Eyes: Pupils equal round reactive to light  ENT: Posterior pharynx normal in appearance, oral mucosa is moist  Chest: Nontender to palpation  Cardiovascular: Regular rate  Lungs: Clear to auscultation bilaterally  Abdomen: Soft, nontender, nondistended, no peritoneal signs  Extremities: No edema, normal appearance  Neuro: GCS 15  Psych: Mood and affect are appropriate        Procedures           ED Course                                               MDM  Number of Diagnoses or Management Options  Abnormal uterine bleeding:   Diagnosis management comments: Patient does need to see a gynecologist.  I did check a hemoglobin which is within the normal range.  She is not pregnant.  We did call Dr. rosenberg's office and made an appointment in 5 days and 9 AM.  Patient was made aware of this and given information.  I did get records from the  [facility].       Amount and/or Complexity of Data Reviewed  Clinical lab tests: reviewed  Decide to obtain previous medical records or to obtain history from someone other than the patient: yes  Obtain history from someone other than the patient: yes  Review and summarize past medical records: yes        Final diagnoses:   Abnormal uterine bleeding            Adrian Pederson MD  01/16/20 0153

## 2020-01-16 NOTE — ED NOTES
At this time, Joe and vasquez was contacted for medical records. She states she will fax over everything she has.      Tesha Oconnor  01/16/20 2416

## 2020-01-21 ENCOUNTER — PREP FOR SURGERY (OUTPATIENT)
Dept: OTHER | Facility: HOSPITAL | Age: 25
End: 2020-01-21

## 2020-01-21 ENCOUNTER — OFFICE VISIT (OUTPATIENT)
Dept: OBSTETRICS AND GYNECOLOGY | Facility: CLINIC | Age: 25
End: 2020-01-21

## 2020-01-21 VITALS
BODY MASS INDEX: 48.82 KG/M2 | HEIGHT: 65 IN | DIASTOLIC BLOOD PRESSURE: 68 MMHG | WEIGHT: 293 LBS | SYSTOLIC BLOOD PRESSURE: 126 MMHG

## 2020-01-21 DIAGNOSIS — E66.01 MORBID OBESITY WITH BMI OF 50.0-59.9, ADULT (HCC): ICD-10-CM

## 2020-01-21 DIAGNOSIS — R93.5 ABNORMAL ULTRASOUND OF ENDOMETRIUM: Primary | ICD-10-CM

## 2020-01-21 DIAGNOSIS — R93.5 ABNORMAL ULTRASOUND OF ENDOMETRIUM: ICD-10-CM

## 2020-01-21 DIAGNOSIS — N92.1 MENORRHAGIA WITH IRREGULAR CYCLE: Primary | ICD-10-CM

## 2020-01-21 PROCEDURE — 99204 OFFICE O/P NEW MOD 45 MIN: CPT | Performed by: OBSTETRICS & GYNECOLOGY

## 2020-01-21 RX ORDER — SODIUM CHLORIDE 0.9 % (FLUSH) 0.9 %
3 SYRINGE (ML) INJECTION EVERY 12 HOURS SCHEDULED
Status: CANCELLED | OUTPATIENT
Start: 2020-01-21

## 2020-01-21 RX ORDER — SODIUM CHLORIDE 0.9 % (FLUSH) 0.9 %
10 SYRINGE (ML) INJECTION AS NEEDED
Status: CANCELLED | OUTPATIENT
Start: 2020-01-21

## 2020-01-21 NOTE — PROGRESS NOTES
Subjective  Chief Complaint   Patient presents with   • Menorrhagia     Patient complains of heavy and painful menstrual cycle lasting over 2 weeks.      Patient is 24 y.o.  here as a new patient for evaluation of prolonged vaginal bleeding.  Patient gives a history of having irregular menses since menarche.  Patient reports she will normally skip 1 month at a time.  Patient had the onset of bleeding starting on January 10.  Patient is still bleeding today.  Patient reports she will change a pad every 1-2 hours.  Patient reports episodes of bleeding and changing pad every 30 minutes.  Patient had had a normal cycle 1 week prior to the onset of prolonged bleeding.  [facility] [place]  Patient was seen on .  Her labs are reviewed today as well as the ultrasound findings.  Patient was noted to have a normal size uterus and endometrium was 9 mm.  Patient was given Provera for 7 days.  Patient reports her bleeding stopped on the third day.  Patient started bleeding again as noted above.  Patient does report having dizziness and lightheadedness.  Patient reports changing a pad every 2 hours passing large clots.  Patient reports her last Pap smear was 1 year ago and was normal.  Patient was seen again in the emergency room here on .  Those records are reviewed as well.  Patient had labs only.  The patient's ultrasound at [facility] showed the endometrium to be 9 mm with the mention of tiny cystic changes seen.  I discussed with the patient the various options for treatment.  Repeat transvaginal ultrasound was performed today to evaluate the endometrium.    History  Past Medical History:   Diagnosis Date   • Amenorrhea, secondary    • Anemia    • Anxiety    • Depression    • Gastroesophageal reflux disease    • Headache    • Low back pain    • Nightmares    • Obesity    • Rectal bleeding    • Ulnar neuropathy at elbow      Current Outpatient Medications  on File Prior to Visit   Medication Sig Dispense Refill   • albuterol (PROVENTIL HFA;VENTOLIN HFA) 108 (90 Base) MCG/ACT inhaler Inhale 2 puffs Every 6 (Six) Hours As Needed for Wheezing or Shortness of Air. 1 inhaler 3   • EPINEPHrine (EPIPEN) 0.3 MG/0.3ML solution auto-injector injection      • FLUoxetine (PROzac) 20 MG capsule TAKE ONE CAPSULE BY MOUTH EVERY DAY 30 capsule 5   • lidocaine viscous (XYLOCAINE) 2 % solution Swish and spit or gargle 10 mL 4 times daily as needed for throat / mouth pain 200 mL 0   • Tiotropium Bromide Monohydrate (SPIRIVA RESPIMAT) 1.25 MCG/ACT aerosol solution inhaler Inhale 2 puffs Daily. 1 inhaler 0   • Topiramate ER 50 MG capsule extended-release 24 hour sprinkle TAKE ONE TABLET BY MOUTH EVERY DAY 30 each 5   • triamcinolone (KENALOG) 0.1 % cream Apply  topically 2 (Two) Times a Day. 45 g 6   • [DISCONTINUED] diclofenac (VOLTAREN) 75 MG EC tablet Take 1 tablet by mouth 2 (Two) Times a Day. 60 tablet 1   • [DISCONTINUED] methylPREDNISolone (MEDROL, DEEPTHI,) 4 MG tablet Take as directed on package instructions. 21 tablet 0     No current facility-administered medications on file prior to visit.      Allergies   Allergen Reactions   • Bee Venom Anaphylaxis   • Sertraline Hcl Other (See Comments)     Nightmares     Past Surgical History:   Procedure Laterality Date   • COLONOSCOPY N/A 5/8/2019    Procedure: COLONOSCOPY;  Surgeon: Alex Bartholomew MD;  Location: [facility] ENDOSCOPY;  Service: Gastroenterology   • WISDOM TOOTH EXTRACTION       Family History   Problem Relation Age of Onset   • Depression Mother    • Diabetes Mother    • Hyperlipidemia Mother    • Hypertension Mother    • Arthritis Mother    • Mental illness Mother    • Obesity Mother    • Arthritis Father    • COPD Father    • Depression Father    • Heart disease Father    • Hypertension Father    • Hyperlipidemia Father    • Stroke Father    • Obesity Father    • Mental illness Father    • Heart attack Father    •  "Depression Maternal Aunt    • Diabetes Maternal Aunt    • Hypertension Maternal Aunt    • Mental illness Maternal Aunt    • Obesity Maternal Aunt    • Cancer Paternal Aunt    • Breast cancer Paternal Aunt    • Diabetes Other         Grandmother    • Mental illness Sister    • Mental illness Sister      Social History     Socioeconomic History   • Marital status:      Spouse name: Not on file   • Number of children: Not on file   • Years of education: Not on file   • Highest education level: Not on file   Tobacco Use   • Smoking status: Current Every Day Smoker     Packs/day: 1.00     Years: 0.50     Pack years: 0.50     Types: Cigarettes   • Smokeless tobacco: Never Used   Substance and Sexual Activity   • Alcohol use: Yes   • Drug use: No   • Sexual activity: Yes     Partners: Male     Birth control/protection: None     Review of Systems  All systems were reviewed and negative except for:  Constitution:  positive for trouble sleeping and weight loss  Genitourinary: postivie for  abnormal menstrual bleeding and pelvic pain  Endocrine: positive for  hot flashes     Objective  Vitals:    01/21/20 0919   BP: 126/68   Weight: (!) 152 kg (334 lb)   Height: 165.1 cm (65\")     Physical Exam:  General Appearance: alert, appears stated age and cooperative  Head: normocephalic, without obvious abnormality and atraumatic  Eyes: lids and lashes normal, conjunctivae and sclerae normal, no icterus, no pallor, corneas clear and PERRLA  Ears: ears appear intact with no abnormalities noted  Nose: nares normal, septum midline, mucosa normal and no drainage  Neck: suppple, trachea midline and no thyromegaly  Lungs: clear to auscultation, respirations regular, respirations even and respirations unlabored  Heart: regular rhythm and normal rate, normal S1, S2, no murmur, gallop, or rubs and no click  Breasts: Not performed.  Abdomen: normal bowel sounds, no masses, no hepatomegaly, no splenomegaly, soft non-tender, no guarding and " no rebound tenderness  Pelvic: Not performed.  Extremities: moves extremities well, no edema, no cyanosis and no redness  Skin: no bleeding, bruising or rash and no lesions noted  Lymph Nodes: no palpable adenopathy  Neuro: CN II-X grossly intact; sensation intact  Psych: normal mood and affect, oriented to person, time and place, thought content organized and appropriate judgment  Lab Review   Labs as noted  Admission on 2020, Discharged on 2020   Component Date Value   • Hemoglobin 2020 14.8    • Hematocrit 2020 46.3    • HCG Qualitative 2020 Negative        Imaging   Pelvic ultrasound report  Pelvic ultrasound images independantly reviewed; see report as noted  US Non-ob Transvaginal  Gracie Clemente  : 1995  MRN: [id number]  Date: 2020    Reason for exam/History:  menorrhagia    The ultrasound images are reviewed.  The uterus is anteverted in position.    The uterus appears normal.  The endometrium measures 11.7 mms.  The   bilateral ovaries are normal in appearance.  There is normal vascular flow   noted.  There is no free fluid noted.    The exam limitations noted:  none    See ultrasound report for measurements and structures identified.    Niya Sun MD, [facility]  OB GYN [place]    Decision to Obtain Medical Records  No    Summary of Medical Records  No    Assessment/Plan  Problem List Items Addressed This Visit        Genitourinary    Abnormal ultrasound of endometrium  Transvaginal ultrasound today shows an endometrium of 11 to 12 mm.  Patient's previous scan showed cystic changes in the endometrium.  We will plan D&C with diagnostic hysteroscopy for further evaluation.  Plan pending pathology.  Morbid obesity  Patient with morbid obesity as noted.  I discussed with patient increased risk of uterine hyperplasia given her obesity.  We will plan D&C with diagnostic hysteroscopy for further evaluation.  Plan pending results.       Other Visit Diagnoses     Menorrhagia with irregular cycle    -  Primary  Patient with continued menorrhagia as noted.  Repeat transvaginal ultrasound shows the endometrium to be 11 to 12 mm as noted.  I had a long detailed discussion with the patient as well as her significant other.  The patient has failed a trial with medical management with Provera.  I discussed with the patient a D&C with diagnostic hysteroscopy for further evaluation.  I discussed with patient the risk, complications, benefits, as well as other alternatives.  Patient desires to schedule as noted.  Plan pending pathology.    Relevant Orders    US Non-ob Transvaginal (Completed)          Follow up as discussed/scheduled  Note: Speech recognition transcription software may have been used to dictate portions of this document.  An attempt at proofreading has been made though minor errors in transcription may still be present.  This note was electronically signed.  Niya Sun M.D.

## 2020-02-03 ENCOUNTER — APPOINTMENT (OUTPATIENT)
Dept: PREADMISSION TESTING | Facility: HOSPITAL | Age: 25
End: 2020-02-03

## 2020-02-03 ENCOUNTER — APPOINTMENT (OUTPATIENT)
Dept: LAB | Facility: HOSPITAL | Age: 25
End: 2020-02-03

## 2020-02-03 VITALS — BODY MASS INDEX: 48.82 KG/M2 | WEIGHT: 293 LBS | HEIGHT: 65 IN

## 2020-02-03 DIAGNOSIS — R93.5 ABNORMAL ULTRASOUND OF ENDOMETRIUM: ICD-10-CM

## 2020-02-03 LAB
ANION GAP SERPL CALCULATED.3IONS-SCNC: 11.1 MMOL/L (ref 5–15)
BASOPHILS # BLD AUTO: 0.09 10*3/MM3 (ref 0–0.2)
BASOPHILS NFR BLD AUTO: 0.9 % (ref 0–1.5)
BILIRUB UR QL STRIP: NEGATIVE
BUN BLD-MCNC: 10 MG/DL (ref 6–20)
BUN/CREAT SERPL: 15.2 (ref 7–25)
CALCIUM SPEC-SCNC: 8.9 MG/DL (ref 8.6–10.5)
CHLORIDE SERPL-SCNC: 105 MMOL/L (ref 98–107)
CLARITY UR: CLEAR
CO2 SERPL-SCNC: 22.9 MMOL/L (ref 22–29)
COLOR UR: YELLOW
CREAT BLD-MCNC: 0.66 MG/DL (ref 0.57–1)
DEPRECATED RDW RBC AUTO: 42.5 FL (ref 37–54)
EOSINOPHIL # BLD AUTO: 0.43 10*3/MM3 (ref 0–0.4)
EOSINOPHIL NFR BLD AUTO: 4.1 % (ref 0.3–6.2)
ERYTHROCYTE [DISTWIDTH] IN BLOOD BY AUTOMATED COUNT: 13 % (ref 12.3–15.4)
GFR SERPL CREATININE-BSD FRML MDRD: 112 ML/MIN/1.73
GLUCOSE BLD-MCNC: 93 MG/DL (ref 65–99)
GLUCOSE UR STRIP-MCNC: NEGATIVE MG/DL
HCT VFR BLD AUTO: 41.8 % (ref 34–46.6)
HGB BLD-MCNC: 13.1 G/DL (ref 12–15.9)
HGB UR QL STRIP.AUTO: NEGATIVE
IMM GRANULOCYTES # BLD AUTO: 0.03 10*3/MM3 (ref 0–0.05)
IMM GRANULOCYTES NFR BLD AUTO: 0.3 % (ref 0–0.5)
KETONES UR QL STRIP: NEGATIVE
LEUKOCYTE ESTERASE UR QL STRIP.AUTO: NEGATIVE
LYMPHOCYTES # BLD AUTO: 2.5 10*3/MM3 (ref 0.7–3.1)
LYMPHOCYTES NFR BLD AUTO: 23.8 % (ref 19.6–45.3)
MCH RBC QN AUTO: 28.1 PG (ref 26.6–33)
MCHC RBC AUTO-ENTMCNC: 31.3 G/DL (ref 31.5–35.7)
MCV RBC AUTO: 89.5 FL (ref 79–97)
MONOCYTES # BLD AUTO: 0.71 10*3/MM3 (ref 0.1–0.9)
MONOCYTES NFR BLD AUTO: 6.8 % (ref 5–12)
NEUTROPHILS # BLD AUTO: 6.73 10*3/MM3 (ref 1.7–7)
NEUTROPHILS NFR BLD AUTO: 64.1 % (ref 42.7–76)
NITRITE UR QL STRIP: NEGATIVE
NRBC BLD AUTO-RTO: 0 /100 WBC (ref 0–0.2)
PH UR STRIP.AUTO: 5.5 [PH] (ref 5–8)
PLATELET # BLD AUTO: 250 10*3/MM3 (ref 140–450)
PMV BLD AUTO: 11.1 FL (ref 6–12)
POTASSIUM BLD-SCNC: 3.9 MMOL/L (ref 3.5–5.2)
PROT UR QL STRIP: NEGATIVE
RBC # BLD AUTO: 4.67 10*6/MM3 (ref 3.77–5.28)
SODIUM BLD-SCNC: 139 MMOL/L (ref 136–145)
SP GR UR STRIP: 1.02 (ref 1–1.03)
UROBILINOGEN UR QL STRIP: NORMAL
WBC NRBC COR # BLD: 10.49 10*3/MM3 (ref 3.4–10.8)

## 2020-02-03 PROCEDURE — 36415 COLL VENOUS BLD VENIPUNCTURE: CPT

## 2020-02-03 PROCEDURE — 85025 COMPLETE CBC W/AUTO DIFF WBC: CPT | Performed by: OBSTETRICS & GYNECOLOGY

## 2020-02-03 PROCEDURE — 81003 URINALYSIS AUTO W/O SCOPE: CPT | Performed by: OBSTETRICS & GYNECOLOGY

## 2020-02-03 PROCEDURE — 80048 BASIC METABOLIC PNL TOTAL CA: CPT | Performed by: OBSTETRICS & GYNECOLOGY

## 2020-02-10 PROCEDURE — S0260 H&P FOR SURGERY: HCPCS | Performed by: OBSTETRICS & GYNECOLOGY

## 2020-02-10 NOTE — H&P
LILLIANA Valles  Gracie Clemente  : 1995  MRN: [id number]  CSN: [id number]    History and Physical    Subjective   Gracie Clemente is a 24 y.o. year old  who present for surgery due to history of irregular menses followed by menorrhagia.  Pt has history of skipping menses since menarche.  Pt has been seen in the ER two times over the last month with menorrhagia.  Pt has failed conservative management with provera.  Pt is symptomatic and had TVS showing cystic changes in her endometrium.  Pt is here for further evaluation with D&C, dx hysteroscopy.    History  Past Medical History:   Diagnosis Date   • Abnormal uterine bleeding (AUB)    • Amenorrhea, secondary    • Anemia    • Anxiety and depression    • Gastroesophageal reflux disease    • Headache    • History of suicidal ideation 2017   • Low back pain    • Morbid obesity with BMI of 50.0-59.9, adult (CMS/[facility])    • Nightmares    • Obesity    • Rectal bleeding    • Recurrent major depressive disorder (CMS/[facility])    • 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     No current facility-administered medications on file prior to encounter.      Current Outpatient Medications on File Prior to Encounter   Medication Sig Dispense Refill   • albuterol (PROVENTIL HFA;VENTOLIN HFA) 108 (90 Base) MCG/ACT inhaler Inhale 2 puffs Every 6 (Six) Hours As Needed for Wheezing or Shortness of Air. 1 inhaler 3   • EPINEPHrine (EPIPEN) 0.3 MG/0.3ML solution auto-injector injection      • FLUoxetine (PROzac) 20 MG capsule TAKE ONE CAPSULE BY MOUTH EVERY DAY 30 capsule 5   • lidocaine viscous (XYLOCAINE) 2 % solution Swish and spit or gargle 10 mL 4 times daily as needed for throat / mouth pain 200 mL 0   • Tiotropium Bromide Monohydrate (SPIRIVA RESPIMAT) 1.25 MCG/ACT aerosol solution inhaler Inhale 2 puffs Daily. 1 inhaler 0   • Topiramate ER 50 MG capsule extended-release 24 hour sprinkle TAKE ONE TABLET BY MOUTH EVERY DAY 30 each 5   • triamcinolone (KENALOG) 0.1 %  cream Apply  topically 2 (Two) Times a Day. 45 g 6     Allergies   Allergen Reactions   • Bee Venom Anaphylaxis   • Sertraline Hcl Other (See Comments)     Nightmares     Past Surgical History:   Procedure Laterality Date   • COLONOSCOPY N/A 5/8/2019    Procedure: COLONOSCOPY;  Surgeon: Alex Bartholomew MD;  Location: [facility] ENDOSCOPY;  Service: Gastroenterology   • WISDOM TOOTH EXTRACTION       Family History   Problem Relation Age of Onset   • Depression Mother    • Diabetes Mother    • Hyperlipidemia Mother    • Hypertension Mother    • Arthritis Mother    • Mental illness Mother    • Obesity Mother    • Arthritis Father    • COPD Father    • Depression Father    • Heart disease Father    • Hypertension Father    • Hyperlipidemia Father    • Stroke Father    • Obesity Father    • Mental illness Father    • Heart attack Father    • Depression Maternal Aunt    • Diabetes Maternal Aunt    • Hypertension Maternal Aunt    • Mental illness Maternal Aunt    • Obesity Maternal Aunt    • Cancer Paternal Aunt    • Breast cancer Paternal Aunt    • Diabetes Other         Grandmother    • Mental illness Sister    • Mental illness Sister      Social History     Socioeconomic History   • Marital status:      Spouse name: Not on file   • Number of children: Not on file   • Years of education: Not on file   • Highest education level: Not on file   Tobacco Use   • Smoking status: Current Every Day Smoker     Packs/day: 1.00     Years: 0.50     Pack years: 0.50     Types: Cigarettes   • Smokeless tobacco: Never Used   Substance and Sexual Activity   • Alcohol use: Yes   • Drug use: No   • Sexual activity: Yes     Partners: Male     Birth control/protection: None     Review of Systems  The following systems were reviewed and negative:  constitution, eyes, ENT, respiratory, cardiovascular, gastrointestinal, genitourinary, integument, breast, hematologic / lymphatic, musculoskeletal, neurological, behavioral/psych, endocrine  and allergies / immunologic     Objective  Recent Vitals       1/16/2020 1/16/2020 1/21/2020       BP:  109/80  101/88  126/68     Pulse:  102  98  --     Temp:  97.3 °F (36.3 °C)  97.9 °F (36.6 °C)  --     Weight:  (!) 148 kg (326 lb)  --  (!) 152 kg (334 lb)     BMI (Calculated):  55.9  --  55.6         Physical Exam:  General Appearance: alert, appears stated age and cooperative  Head: normocephalic, without obvious abnormality and atraumatic  Eyes: lids and lashes normal, conjunctivae and sclerae normal, no icterus, no pallor and corneas clear  Lungs: clear to auscultation, respirations regular, respirations even and respirations unlabored  Heart: regular rhythm and normal rate, normal S1, S2, no murmur, gallop, or rubs and no click  Abdomen: normal bowel sounds, no masses, no hepatomegaly, no splenomegaly, soft non-tender, no guarding and no rebound tenderness  Extremities: moves extremities well, no edema, no cyanosis and no redness  Skin: no bleeding, bruising or rash and no lesions noted  Psych: normal mood and affect, oriented to person, time and place, thought content organized and appropriate judgment    Labs  Lab Results   Component Value Date     02/03/2020    HGB 13.1 02/03/2020    HCT 41.8 02/03/2020    WBC 10.49 02/03/2020     02/03/2020    K 3.9 02/03/2020     02/03/2020    CO2 22.9 02/03/2020    BUN 10 02/03/2020    CREATININE 0.66 02/03/2020    GLUCOSE 93 02/03/2020    ALBUMIN 4.00 05/02/2019    CALCIUM 8.9 02/03/2020    AST 27 05/02/2019    ALT 30 05/02/2019    BILITOT 0.3 05/02/2019      Lab Results   Component Value Date    SQUAMEPIUA 0-2 05/02/2019    SPECGRAVUR 1.025 02/03/2020    KETONESU Negative 02/03/2020    BLOODU Negative 02/03/2020    LEUKOCYTESUR Negative 02/03/2020    NITRITEU Negative 02/03/2020    RBCUA 20-50 (A) 01/02/2020    WBCUA 0-2 (A) 05/02/2019    BACTERIA Trace (A) 05/02/2019        No results found for: URINECX     Assessment   1. History of  oligomenorrhea  2. Menorrhagia  3. Abnormal endometrium on ultrasound     Plan   1. D&C with dx hysteroscopy.  2. ABx and DVT prophylaxis as indicated.  3. Risks, complications, benefits, and other alternatives discussed.  4. All questions answered and pt in agreement with plan.    Niya Sun M.D.  2/10/2020  8:39 AM

## 2020-02-11 ENCOUNTER — ANESTHESIA (OUTPATIENT)
Dept: PERIOP | Facility: HOSPITAL | Age: 25
End: 2020-02-11

## 2020-02-11 ENCOUNTER — ANESTHESIA EVENT (OUTPATIENT)
Dept: PERIOP | Facility: HOSPITAL | Age: 25
End: 2020-02-11

## 2020-02-11 ENCOUNTER — HOSPITAL ENCOUNTER (OUTPATIENT)
Facility: HOSPITAL | Age: 25
Setting detail: HOSPITAL OUTPATIENT SURGERY
Discharge: HOME | End: 2020-02-11
Attending: OBSTETRICS & GYNECOLOGY | Admitting: OBSTETRICS & GYNECOLOGY

## 2020-02-11 VITALS
RESPIRATION RATE: 18 BRPM | TEMPERATURE: 97.6 F | HEART RATE: 64 BPM | DIASTOLIC BLOOD PRESSURE: 85 MMHG | SYSTOLIC BLOOD PRESSURE: 123 MMHG | OXYGEN SATURATION: 96 %

## 2020-02-11 DIAGNOSIS — R93.5 ABNORMAL ULTRASOUND OF ENDOMETRIUM: ICD-10-CM

## 2020-02-11 PROCEDURE — 58558 HYSTEROSCOPY BIOPSY: CPT | Performed by: OBSTETRICS & GYNECOLOGY

## 2020-02-11 PROCEDURE — 25010000002 PROPOFOL 10 MG/ML EMULSION: Performed by: NURSE ANESTHETIST, CERTIFIED REGISTERED

## 2020-02-11 PROCEDURE — 25010000002 HYDROMORPHONE PER 4 MG: Performed by: NURSE ANESTHETIST, CERTIFIED REGISTERED

## 2020-02-11 PROCEDURE — 25010000003 LIDOCAINE 1 % SOLUTION: Performed by: OBSTETRICS & GYNECOLOGY

## 2020-02-11 PROCEDURE — 25010000002 PROPOFOL 200 MG/20ML EMULSION: Performed by: NURSE ANESTHETIST, CERTIFIED REGISTERED

## 2020-02-11 PROCEDURE — 81025 URINE PREGNANCY TEST: CPT | Performed by: OBSTETRICS & GYNECOLOGY

## 2020-02-11 PROCEDURE — 25010000002 DEXAMETHASONE PER 1 MG: Performed by: NURSE ANESTHETIST, CERTIFIED REGISTERED

## 2020-02-11 PROCEDURE — 25010000002 KETOROLAC TROMETHAMINE PER 15 MG: Performed by: NURSE ANESTHETIST, CERTIFIED REGISTERED

## 2020-02-11 RX ORDER — HYDROMORPHONE HCL 110MG/55ML
PATIENT CONTROLLED ANALGESIA SYRINGE INTRAVENOUS AS NEEDED
Status: DISCONTINUED | OUTPATIENT
Start: 2020-02-11 | End: 2020-02-11 | Stop reason: SURG

## 2020-02-11 RX ORDER — SODIUM CHLORIDE 0.9 % (FLUSH) 0.9 %
10 SYRINGE (ML) INJECTION AS NEEDED
Status: DISCONTINUED | OUTPATIENT
Start: 2020-02-11 | End: 2020-02-11 | Stop reason: HOSPADM

## 2020-02-11 RX ORDER — SODIUM CHLORIDE, SODIUM LACTATE, POTASSIUM CHLORIDE, CALCIUM CHLORIDE 600; 310; 30; 20 MG/100ML; MG/100ML; MG/100ML; MG/100ML
1000 INJECTION, SOLUTION INTRAVENOUS CONTINUOUS
Status: DISCONTINUED | OUTPATIENT
Start: 2020-02-11 | End: 2020-02-11 | Stop reason: HOSPADM

## 2020-02-11 RX ORDER — IBUPROFEN 800 MG/1
800 TABLET ORAL EVERY 8 HOURS PRN
Qty: 30 TABLET | Refills: 0 | OUTPATIENT
Start: 2020-02-11 | End: 2020-06-07

## 2020-02-11 RX ORDER — LIDOCAINE HYDROCHLORIDE 10 MG/ML
INJECTION, SOLUTION INFILTRATION; PERINEURAL AS NEEDED
Status: DISCONTINUED | OUTPATIENT
Start: 2020-02-11 | End: 2020-02-11 | Stop reason: HOSPADM

## 2020-02-11 RX ORDER — KETAMINE HCL IN NACL, ISO-OSM 100MG/10ML
SYRINGE (ML) INJECTION AS NEEDED
Status: DISCONTINUED | OUTPATIENT
Start: 2020-02-11 | End: 2020-02-11 | Stop reason: SURG

## 2020-02-11 RX ORDER — ONDANSETRON HYDROCHLORIDE 8 MG/1
8 TABLET, FILM COATED ORAL EVERY 8 HOURS PRN
Qty: 10 TABLET | Refills: 0 | Status: SHIPPED | OUTPATIENT
Start: 2020-02-11 | End: 2020-12-18

## 2020-02-11 RX ORDER — DEXAMETHASONE SODIUM PHOSPHATE 4 MG/ML
INJECTION, SOLUTION INTRA-ARTICULAR; INTRALESIONAL; INTRAMUSCULAR; INTRAVENOUS; SOFT TISSUE AS NEEDED
Status: DISCONTINUED | OUTPATIENT
Start: 2020-02-11 | End: 2020-02-11 | Stop reason: SURG

## 2020-02-11 RX ORDER — PROMETHAZINE HYDROCHLORIDE 12.5 MG/1
12.5 TABLET ORAL ONCE AS NEEDED
Status: DISCONTINUED | OUTPATIENT
Start: 2020-02-11 | End: 2020-02-11 | Stop reason: HOSPADM

## 2020-02-11 RX ORDER — PROPOFOL 10 MG/ML
INJECTION, EMULSION INTRAVENOUS AS NEEDED
Status: DISCONTINUED | OUTPATIENT
Start: 2020-02-11 | End: 2020-02-11 | Stop reason: SURG

## 2020-02-11 RX ORDER — MAGNESIUM HYDROXIDE 1200 MG/15ML
LIQUID ORAL AS NEEDED
Status: DISCONTINUED | OUTPATIENT
Start: 2020-02-11 | End: 2020-02-11 | Stop reason: HOSPADM

## 2020-02-11 RX ORDER — PROMETHAZINE HYDROCHLORIDE 25 MG/ML
12.5 INJECTION, SOLUTION INTRAMUSCULAR; INTRAVENOUS ONCE AS NEEDED
Status: DISCONTINUED | OUTPATIENT
Start: 2020-02-11 | End: 2020-02-11 | Stop reason: HOSPADM

## 2020-02-11 RX ORDER — LIDOCAINE HYDROCHLORIDE 20 MG/ML
INJECTION, SOLUTION INTRAVENOUS AS NEEDED
Status: DISCONTINUED | OUTPATIENT
Start: 2020-02-11 | End: 2020-02-11 | Stop reason: SURG

## 2020-02-11 RX ORDER — ONDANSETRON 2 MG/ML
4 INJECTION INTRAMUSCULAR; INTRAVENOUS ONCE AS NEEDED
Status: DISCONTINUED | OUTPATIENT
Start: 2020-02-11 | End: 2020-02-11 | Stop reason: HOSPADM

## 2020-02-11 RX ORDER — KETOROLAC TROMETHAMINE 30 MG/ML
INJECTION, SOLUTION INTRAMUSCULAR; INTRAVENOUS AS NEEDED
Status: DISCONTINUED | OUTPATIENT
Start: 2020-02-11 | End: 2020-02-11 | Stop reason: SURG

## 2020-02-11 RX ORDER — ONDANSETRON 4 MG/1
4 TABLET, FILM COATED ORAL ONCE AS NEEDED
Status: DISCONTINUED | OUTPATIENT
Start: 2020-02-11 | End: 2020-02-11 | Stop reason: HOSPADM

## 2020-02-11 RX ORDER — ACETAMINOPHEN 500 MG
1000 TABLET ORAL EVERY 8 HOURS PRN
Qty: 60 TABLET | Refills: 0 | OUTPATIENT
Start: 2020-02-11 | End: 2020-06-07

## 2020-02-11 RX ORDER — SODIUM CHLORIDE 0.9 % (FLUSH) 0.9 %
3 SYRINGE (ML) INJECTION EVERY 12 HOURS SCHEDULED
Status: DISCONTINUED | OUTPATIENT
Start: 2020-02-11 | End: 2020-02-11 | Stop reason: HOSPADM

## 2020-02-11 RX ADMIN — Medication 25 MG: at 07:37

## 2020-02-11 RX ADMIN — LIDOCAINE HYDROCHLORIDE 50 MG: 20 INJECTION, SOLUTION INTRAVENOUS at 07:28

## 2020-02-11 RX ADMIN — HYDROMORPHONE HYDROCHLORIDE 0.5 MG: 2 INJECTION, SOLUTION INTRAMUSCULAR; INTRAVENOUS; SUBCUTANEOUS at 07:53

## 2020-02-11 RX ADMIN — PROPOFOL 50 MG: 10 INJECTION, EMULSION INTRAVENOUS at 07:36

## 2020-02-11 RX ADMIN — PROPOFOL 120 MCG/KG/MIN: 10 INJECTION, EMULSION INTRAVENOUS at 07:30

## 2020-02-11 RX ADMIN — SODIUM CHLORIDE, POTASSIUM CHLORIDE, SODIUM LACTATE AND CALCIUM CHLORIDE 1000 ML: 600; 310; 30; 20 INJECTION, SOLUTION INTRAVENOUS at 06:46

## 2020-02-11 RX ADMIN — DEXAMETHASONE SODIUM PHOSPHATE 8 MG: 4 INJECTION, SOLUTION INTRAMUSCULAR; INTRAVENOUS at 07:30

## 2020-02-11 RX ADMIN — KETOROLAC TROMETHAMINE 30 MG: 30 INJECTION, SOLUTION INTRAMUSCULAR at 07:30

## 2020-02-11 RX ADMIN — PROPOFOL 50 MG: 10 INJECTION, EMULSION INTRAVENOUS at 07:28

## 2020-02-11 RX ADMIN — Medication 25 MG: at 07:28

## 2020-02-11 RX ADMIN — PROPOFOL 50 MG: 10 INJECTION, EMULSION INTRAVENOUS at 07:43

## 2020-02-11 NOTE — ANESTHESIA PREPROCEDURE EVALUATION
Anesthesia Evaluation     Patient summary reviewed and Nursing notes reviewed   NPO Solid Status: > 8 hours  NPO Liquid Status: > 8 hours           Airway   Mallampati: II  TM distance: >3 FB  Neck ROM: full  No difficulty expected and Possible difficult intubation  Dental - normal exam     Pulmonary - normal exam   (+) a smoker Current Smoked day of surgery,   Cardiovascular - negative cardio ROS and normal exam  Exercise tolerance: good (4-7 METS)        Neuro/Psych  (+) headaches, numbness, psychiatric history Anxiety and Depression,     GI/Hepatic/Renal/Endo    (+) morbid obesity, GERD,      Musculoskeletal (-) negative ROS    Abdominal   (+) obese,     Bowel sounds: normal.   Substance History - negative use     OB/GYN negative ob/gyn ROS         Other                        Anesthesia Plan    ASA 3     MAC     intravenous induction     Anesthetic plan, all risks, benefits, and alternatives have been provided, discussed and informed consent has been obtained with: patient.    Plan discussed with attending.

## 2020-02-11 NOTE — OP NOTE
LILLIANA Bowman Garima Clemente  : 1995  MRN: [id number]  CSN: [id number]  Date: 2020    Operative Report    Pre-op Diagnosis:  Abnormal ultrasound of endometrium [R93.5]   Post-op Diagnosis:  Post-Op Diagnosis Codes:     * Abnormal ultrasound of endometrium [R93.5]   Procedure: Procedure(s):  DILATATION AND CURETTAGE DIAGNOSTIC HYSTEROSCOPY   Surgeon: Niya Sun M.D.   Assist: None   Anesthesia: Monitored Anesthesia Care   Estimated Blood Loss: 5 mls   ABx: none   Specimens:  endometrial   Findings: Grossly normal appearing endometrium with bilateral ostia identified.   Complications: none   Indications:  See H&P         Description of Procedure:  After the appropriate time out and adequate dosing of her anesthesia, the patient had been prepped and draped in the usual sterile fashion.  She was placed in the dorsal lithotomy position using Dwight stirrups.  The bladder had been drained with a red rubber catheter per nursing.  A weighted speculum was placed in the vagina.  The anterior lip of the cervix was grasped with a single-tooth tenaculum.  The cervix was injected at the 3 o'clock and 9 o'clock position with 1% lidocaine plain without any complications.  The cervix was then progressively dilated using Roa dilators.  Rigid hysteroscopy was then performed with the above findings noted.  Sharp curettings were then obtained with a good cry throughout with tissue retrieved and sent for pathologic specimen.  The cervical tenaculum was removed and the cervix was noted to be hemostatic.  All instrument and sponge counts were correct at the end of the procedure.  The patient tolerated the procedure well.  There were no complications.  She was taken to the postoperative recovery room in stable condition.    Niya Sun M.D.  2020  8:43 AM

## 2020-02-11 NOTE — ANESTHESIA POSTPROCEDURE EVALUATION
Patient: Gracie Clemente    Procedure Summary     Date:  02/11/20 Room / Location:  [facility] OR 2 /  [facility]    Anesthesia Start:  0722 Anesthesia Stop:  0801    Procedure:  DILATATION AND CURETTAGE DIAGNOSTIC HYSTEROSCOPY (N/A Uterus) Diagnosis:       Abnormal ultrasound of endometrium      (Abnormal ultrasound of endometrium [R93.5])    Surgeon:  Niya Sun MD Provider:  Sukhdev Knox CRNA    Anesthesia Type:  MAC ASA Status:  3          Anesthesia Type: MAC    Vitals  Vitals Value Taken Time   /85 2/11/2020  8:40 AM   Temp 97.6 °F (36.4 °C) 2/11/2020  7:56 AM   Pulse 64 2/11/2020  8:40 AM   Resp 18 2/11/2020  8:40 AM   SpO2 96 % 2/11/2020  8:40 AM           Post Anesthesia Care and Evaluation    Patient location during evaluation: bedside  Patient participation: complete - patient participated  Level of consciousness: awake and alert  Pain score: 0  Pain management: satisfactory to patient  Airway patency: patent  Anesthetic complications: No anesthetic complications  PONV Status: none  Cardiovascular status: acceptable and hemodynamically stable  Respiratory status: acceptable  Hydration status: acceptable

## 2020-02-11 NOTE — DISCHARGE INSTRUCTIONS
Pelvic rest is best described as not putting anything in your vagina. This includes tampons, douching, tub bathing or sexual activity.      No pushing, pulling, tugging,  heavy lifting, or strenuous activity.  No major decision making, driving, or drinking alcoholic beverages for 24 hours. ( due to the medications you have  received)  Always use good hand hygiene/washing techniques.  NO driving while taking pain medications.    * if you have an incision:  Check your incision area every day for signs of infection.   Check for:  * more redness, swelling, or pain  *more fluid or blood  *warmth  *pus or bad smell     To assist you in voiding:  Drink plenty of fluids  Listen to running water while attempting to void.    If you are unable to urinate and you have an uncomfortable urge to void or it has been   6 hours since you were discharged, return to the Emergency Room

## 2020-02-14 LAB
LAB AP CASE REPORT: NORMAL
PATH REPORT.FINAL DX SPEC: NORMAL

## 2020-02-17 RX ORDER — DOXYCYCLINE 100 MG/1
100 TABLET ORAL 2 TIMES DAILY
Qty: 42 TABLET | Refills: 0 | Status: SHIPPED | OUTPATIENT
Start: 2020-02-17 | End: 2020-03-09

## 2020-02-21 ENCOUNTER — OFFICE VISIT (OUTPATIENT)
Dept: OBSTETRICS AND GYNECOLOGY | Facility: CLINIC | Age: 25
End: 2020-02-21

## 2020-02-21 VITALS
BODY MASS INDEX: 48.82 KG/M2 | WEIGHT: 293 LBS | SYSTOLIC BLOOD PRESSURE: 126 MMHG | DIASTOLIC BLOOD PRESSURE: 76 MMHG | HEIGHT: 65 IN

## 2020-02-21 DIAGNOSIS — Z09 POSTOPERATIVE FOLLOW-UP: Primary | ICD-10-CM

## 2020-02-21 PROCEDURE — 99024 POSTOP FOLLOW-UP VISIT: CPT | Performed by: PHYSICIAN ASSISTANT

## 2020-02-21 NOTE — PROGRESS NOTES
Subjective   Chief Complaint   Patient presents with   • Post-op Follow-up     10 days post op Hysteroscopy, D&C. Patient complains of spotting and cramping.        Gracie Clemente is a 24 y.o. year old  presenting to be seen for post op visit  She reports doing well post hysteroscopy and D&C  She has not picked up doxycycline antibiotic yet due to cost but plans on getting that today  Has had normal bowel and bladder function  Has had some abdominal cramping. No bleeding but some spotting  Pathology chronic endometritis    Past Medical History:   Diagnosis Date   • Abnormal uterine bleeding (AUB)    • Amenorrhea, secondary    • Anemia    • Anxiety and depression    • Gastroesophageal reflux disease    • Headache    • History of suicidal ideation 2017   • Low back pain    • Morbid obesity with BMI of 50.0-59.9, adult (CMS/[facility])    • Nightmares    • Obesity    • Rectal bleeding    • Recurrent major depressive disorder (CMS/[facility])    • 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        Current Outpatient Medications:   •  acetaminophen (TYLENOL) 500 MG tablet, Take 2 tablets by mouth Every 8 (Eight) Hours As Needed for Mild Pain  or Moderate Pain ., Disp: 60 tablet, Rfl: 0  •  albuterol (PROVENTIL HFA;VENTOLIN HFA) 108 (90 Base) MCG/ACT inhaler, Inhale 2 puffs Every 6 (Six) Hours As Needed for Wheezing or Shortness of Air., Disp: 1 inhaler, Rfl: 3  •  doxycycline (ADOXA) 100 MG tablet, Take 1 tablet by mouth 2 (Two) Times a Day for 21 days., Disp: 42 tablet, Rfl: 0  •  EPINEPHrine (EPIPEN) 0.3 MG/0.3ML solution auto-injector injection, , Disp: , Rfl:   •  FLUoxetine (PROzac) 20 MG capsule, TAKE ONE CAPSULE BY MOUTH EVERY DAY, Disp: 30 capsule, Rfl: 5  •  ibuprofen (ADVIL,MOTRIN) 800 MG tablet, Take 1 tablet by mouth Every 8 (Eight) Hours As Needed for Mild Pain ., Disp: 30 tablet, Rfl: 0  •  ondansetron (ZOFRAN) 8 MG tablet, Take 1 tablet by mouth Every 8 (Eight) Hours As Needed for Nausea or Vomiting., Disp: 10  tablet, Rfl: 0  •  Tiotropium Bromide Monohydrate (SPIRIVA RESPIMAT) 1.25 MCG/ACT aerosol solution inhaler, Inhale 2 puffs Daily., Disp: 1 inhaler, Rfl: 0  •  Topiramate ER 50 MG capsule extended-release 24 hour sprinkle, TAKE ONE TABLET BY MOUTH EVERY DAY, Disp: 30 each, Rfl: 5  •  triamcinolone (KENALOG) 0.1 % cream, Apply  topically 2 (Two) Times a Day., Disp: 45 g, Rfl: 6   Allergies   Allergen Reactions   • Bee Venom Anaphylaxis   • Sertraline Hcl Other (See Comments)     Nightmares      Past Surgical History:   Procedure Laterality Date   • COLONOSCOPY N/A 5/8/2019    Procedure: COLONOSCOPY;  Surgeon: Alex Bartholomew MD;  Location: [facility] ENDOSCOPY;  Service: Gastroenterology   • D&C HYSTEROSCOPY N/A 2/11/2020    Procedure: DILATATION AND CURETTAGE DIAGNOSTIC HYSTEROSCOPY;  Surgeon: Niya Sun MD;  Location: [facility] OR;  Service: Obstetrics/Gynecology;  Laterality: N/A;   • WISDOM TOOTH EXTRACTION        Social History     Socioeconomic History   • Marital status:      Spouse name: Not on file   • Number of children: Not on file   • Years of education: Not on file   • Highest education level: Not on file   Tobacco Use   • Smoking status: Current Every Day Smoker     Packs/day: 1.00     Years: 0.50     Pack years: 0.50     Types: Cigarettes   • Smokeless tobacco: Never Used   Substance and Sexual Activity   • Alcohol use: Yes   • Drug use: No   • Sexual activity: Yes     Partners: Male     Birth control/protection: None      Family History   Problem Relation Age of Onset   • Depression Mother    • Diabetes Mother    • Hyperlipidemia Mother    • Hypertension Mother    • Arthritis Mother    • Mental illness Mother    • Obesity Mother    • Arthritis Father    • COPD Father    • Depression Father    • Heart disease Father    • Hypertension Father    • Hyperlipidemia Father    • Stroke Father    • Obesity Father    • Mental illness Father    • Heart attack Father    • Depression Maternal Aunt    •  "Diabetes Maternal Aunt    • Hypertension Maternal Aunt    • Mental illness Maternal Aunt    • Obesity Maternal Aunt    • Cancer Paternal Aunt    • Breast cancer Paternal Aunt    • Diabetes Other         Grandmother    • Mental illness Sister    • Mental illness Sister        Review of Systems   Constitutional: Negative for chills, diaphoresis and fever.   Gastrointestinal: Negative for diarrhea, nausea and vomiting.   Genitourinary: Negative for difficulty urinating, dysuria and pelvic pain.           Objective   /76   Ht 165.1 cm (65\")   Wt (!) 154 kg (339 lb)   Breastfeeding No   BMI 56.41 kg/m²     Physical Exam         Assessment and Plan  Gracie was seen today for post-op follow-up.    Diagnoses and all orders for this visit:    Postoperative follow-up      Patient Instructions   RTO prn             This note was electronically signed.    Emily Rodrigues PA-C   February 21, 2020  "

## 2020-05-21 ENCOUNTER — TELEMEDICINE (OUTPATIENT)
Dept: PRIMARY CARE CLINIC | Age: 25
End: 2020-05-21
Payer: COMMERCIAL

## 2020-05-21 PROCEDURE — 99213 OFFICE O/P EST LOW 20 MIN: CPT | Performed by: NURSE PRACTITIONER

## 2020-05-21 RX ORDER — HYDROCORTISONE ACETATE 25 MG/1
25 SUPPOSITORY RECTAL 2 TIMES DAILY
Qty: 28 SUPPOSITORY | Refills: 1 | Status: SHIPPED | OUTPATIENT
Start: 2020-05-21 | End: 2020-06-04

## 2020-06-07 ASSESSMENT — ENCOUNTER SYMPTOMS
ABDOMINAL PAIN: 0
BLOOD IN STOOL: 1
NAUSEA: 0
EYE PAIN: 0
SHORTNESS OF BREATH: 0
SORE THROAT: 0
VOMITING: 0
COUGH: 0

## 2020-06-08 NOTE — PROGRESS NOTES
care.  The patient (and/or legal guardian) has also been advised to contact this office for worsening conditions or problems, and seek emergency medical treatment and/or call 911 if deemed necessary. Services were provided through a video synchronous discussion virtually to substitute for in-person clinic visit. Patient and provider were located at their individual homes. --JAREN Gordon on 6/7/2020 at 10:13 PM    An electronic signature was used to authenticate this note.

## 2020-06-21 ENCOUNTER — HOSPITAL ENCOUNTER (EMERGENCY)
Facility: HOSPITAL | Age: 25
Discharge: HOME | End: 2020-06-21
Attending: EMERGENCY MEDICINE
Payer: COMMERCIAL

## 2020-06-21 ENCOUNTER — APPOINTMENT (OUTPATIENT)
Dept: CT IMAGING | Facility: HOSPITAL | Age: 25
End: 2020-06-21
Payer: COMMERCIAL

## 2020-06-21 VITALS
OXYGEN SATURATION: 99 % | DIASTOLIC BLOOD PRESSURE: 91 MMHG | RESPIRATION RATE: 18 BRPM | SYSTOLIC BLOOD PRESSURE: 129 MMHG | HEART RATE: 86 BPM | TEMPERATURE: 98.6 F | WEIGHT: 293 LBS | BODY MASS INDEX: 48.82 KG/M2 | HEIGHT: 65 IN

## 2020-06-21 LAB
ANION GAP SERPL CALCULATED.3IONS-SCNC: 13 MMOL/L (ref 3–16)
BILIRUBIN URINE: NEGATIVE
BLOOD, URINE: ABNORMAL
BUN BLDV-MCNC: 11 MG/DL (ref 6–20)
CALCIUM SERPL-MCNC: 9.3 MG/DL (ref 8.5–10.5)
CHLORIDE BLD-SCNC: 102 MMOL/L (ref 98–107)
CLARITY: CLEAR
CO2: 23 MMOL/L (ref 20–30)
COLOR: YELLOW
CREAT SERPL-MCNC: 0.8 MG/DL (ref 0.4–1.2)
EPITHELIAL CELLS, UA: ABNORMAL /HPF (ref 0–5)
GFR AFRICAN AMERICAN: >59
GFR NON-AFRICAN AMERICAN: >60
GLUCOSE BLD-MCNC: 117 MG/DL (ref 74–106)
GLUCOSE URINE: NEGATIVE MG/DL
HCG(URINE) PREGNANCY TEST: NEGATIVE
HCT VFR BLD CALC: 43.9 % (ref 37–47)
HEMOGLOBIN: 13.9 G/DL (ref 11.5–16.5)
KETONES, URINE: NEGATIVE MG/DL
LEUKOCYTE ESTERASE, URINE: ABNORMAL
MCH RBC QN AUTO: 27.3 PG (ref 27–32)
MCHC RBC AUTO-ENTMCNC: 31.7 G/DL (ref 31–35)
MCV RBC AUTO: 86.1 FL (ref 80–100)
MICROSCOPIC EXAMINATION: YES
NITRITE, URINE: NEGATIVE
PDW BLD-RTO: 13.7 % (ref 11–16)
PH UA: 7 (ref 5–8)
PLATELET # BLD: 246 K/UL (ref 150–400)
PMV BLD AUTO: 11.6 FL (ref 6–10)
POTASSIUM SERPL-SCNC: 4.2 MMOL/L (ref 3.4–5.1)
PROTEIN UA: ABNORMAL MG/DL
RBC # BLD: 5.1 M/UL (ref 3.8–5.8)
RBC UA: ABNORMAL /HPF (ref 0–4)
SODIUM BLD-SCNC: 138 MMOL/L (ref 136–145)
SPECIFIC GRAVITY UA: 1.02 (ref 1–1.03)
URINE REFLEX TO CULTURE: YES
URINE TYPE: ABNORMAL
UROBILINOGEN, URINE: 1 E.U./DL
WBC # BLD: 13.7 K/UL (ref 4–11)
WBC UA: ABNORMAL /HPF (ref 0–5)

## 2020-06-21 PROCEDURE — 96375 TX/PRO/DX INJ NEW DRUG ADDON: CPT

## 2020-06-21 PROCEDURE — 80048 BASIC METABOLIC PNL TOTAL CA: CPT

## 2020-06-21 PROCEDURE — 87077 CULTURE AEROBIC IDENTIFY: CPT

## 2020-06-21 PROCEDURE — 6360000002 HC RX W HCPCS: Performed by: EMERGENCY MEDICINE

## 2020-06-21 PROCEDURE — 87086 URINE CULTURE/COLONY COUNT: CPT

## 2020-06-21 PROCEDURE — 81003 URINALYSIS AUTO W/O SCOPE: CPT

## 2020-06-21 PROCEDURE — 96374 THER/PROPH/DIAG INJ IV PUSH: CPT

## 2020-06-21 PROCEDURE — 84703 CHORIONIC GONADOTROPIN ASSAY: CPT

## 2020-06-21 PROCEDURE — 74176 CT ABD & PELVIS W/O CONTRAST: CPT

## 2020-06-21 PROCEDURE — 2580000003 HC RX 258: Performed by: EMERGENCY MEDICINE

## 2020-06-21 PROCEDURE — 36415 COLL VENOUS BLD VENIPUNCTURE: CPT

## 2020-06-21 PROCEDURE — 85027 COMPLETE CBC AUTOMATED: CPT

## 2020-06-21 PROCEDURE — 99284 EMERGENCY DEPT VISIT MOD MDM: CPT

## 2020-06-21 PROCEDURE — 6370000000 HC RX 637 (ALT 250 FOR IP): Performed by: EMERGENCY MEDICINE

## 2020-06-21 RX ORDER — ONDANSETRON 2 MG/ML
4 INJECTION INTRAMUSCULAR; INTRAVENOUS ONCE
Status: COMPLETED | OUTPATIENT
Start: 2020-06-21 | End: 2020-06-21

## 2020-06-21 RX ORDER — CIPROFLOXACIN 500 MG/1
500 TABLET, FILM COATED ORAL 2 TIMES DAILY
Qty: 10 TABLET | Refills: 0 | Status: SHIPPED | OUTPATIENT
Start: 2020-06-21 | End: 2020-09-23 | Stop reason: ALTCHOICE

## 2020-06-21 RX ORDER — KETOROLAC TROMETHAMINE 30 MG/ML
30 INJECTION, SOLUTION INTRAMUSCULAR; INTRAVENOUS ONCE
Status: COMPLETED | OUTPATIENT
Start: 2020-06-21 | End: 2020-06-21

## 2020-06-21 RX ORDER — CIPROFLOXACIN 500 MG/1
500 TABLET, FILM COATED ORAL ONCE
Status: COMPLETED | OUTPATIENT
Start: 2020-06-21 | End: 2020-06-21

## 2020-06-21 RX ORDER — 0.9 % SODIUM CHLORIDE 0.9 %
1000 INTRAVENOUS SOLUTION INTRAVENOUS ONCE
Status: COMPLETED | OUTPATIENT
Start: 2020-06-21 | End: 2020-06-21

## 2020-06-21 RX ADMIN — KETOROLAC TROMETHAMINE 30 MG: 30 INJECTION, SOLUTION INTRAMUSCULAR at 21:27

## 2020-06-21 RX ADMIN — CIPROFLOXACIN HYDROCHLORIDE 500 MG: 500 TABLET, FILM COATED ORAL at 23:23

## 2020-06-21 RX ADMIN — ONDANSETRON 4 MG: 2 INJECTION INTRAMUSCULAR; INTRAVENOUS at 21:27

## 2020-06-21 RX ADMIN — SODIUM CHLORIDE 1000 ML: 9 INJECTION, SOLUTION INTRAVENOUS at 21:26

## 2020-06-21 ASSESSMENT — PAIN SCALES - GENERAL
PAINLEVEL_OUTOF10: 6
PAINLEVEL_OUTOF10: 6

## 2020-06-21 ASSESSMENT — PAIN DESCRIPTION - FREQUENCY: FREQUENCY: CONTINUOUS

## 2020-06-21 ASSESSMENT — PAIN DESCRIPTION - LOCATION: LOCATION: ABDOMEN

## 2020-06-21 ASSESSMENT — PAIN DESCRIPTION - DESCRIPTORS: DESCRIPTORS: STABBING

## 2020-06-21 ASSESSMENT — PAIN DESCRIPTION - PAIN TYPE: TYPE: ACUTE PAIN

## 2020-06-22 NOTE — ED NOTES
Patient with c/o left lower abdominal pain that started about 1800 this evening, denies urinary problems, patient states that her urine is strong smelling, last bowel movement at 1000, normal for patient.      Deana Moran RN  06/21/20 2050

## 2020-06-22 NOTE — ED PROVIDER NOTES
Past Surgical History:   Procedure Laterality Date    WISDOM TOOTH EXTRACTION           CURRENT MEDICATIONS       Previous Medications    FLUOXETINE (PROZAC) 20 MG CAPSULE    Take 1 capsule by mouth daily    TOPIRAMATE (TOPAMAX) 50 MG TABLET    Take 1 tablet by mouth daily       ALLERGIES     Bee venom; Sertraline; and Zoloft    FAMILY HISTORY       Family History   Problem Relation Age of Onset    Diabetes Mother     Depression Mother     High Blood Pressure Father     Depression Sister     Depression Paternal Aunt     Cancer Paternal Aunt         lung    Diabetes Maternal Grandmother     Depression Maternal Grandmother     High Blood Pressure Maternal Grandmother     Diabetes Maternal Grandfather     High Blood Pressure Maternal Grandfather     Stroke Maternal Grandfather     Diabetes Paternal Grandmother     Cancer Paternal Grandfather         lung          SOCIAL HISTORY       Social History     Socioeconomic History    Marital status: Legally      Spouse name: None    Number of children: None    Years of education: None    Highest education level: None   Occupational History    None   Social Needs    Financial resource strain: None    Food insecurity     Worry: None     Inability: None    Transportation needs     Medical: None     Non-medical: None   Tobacco Use    Smoking status: Current Every Day Smoker     Packs/day: 0.50     Years: 2.00     Pack years: 1.00     Types: Cigarettes     Last attempt to quit: 2018     Years since quittin.5    Smokeless tobacco: Never Used   Substance and Sexual Activity    Alcohol use: No    Drug use: Not Currently     Types: Marijuana    Sexual activity: Yes   Lifestyle    Physical activity     Days per week: None     Minutes per session: None    Stress: None   Relationships    Social connections     Talks on phone: None     Gets together: None     Attends Ep[employer] service: None     Active member of club or organization: None

## 2020-06-24 LAB — URINE CULTURE, ROUTINE: NORMAL

## 2020-07-30 RX ORDER — FLUOXETINE HYDROCHLORIDE 20 MG/1
20 CAPSULE ORAL DAILY
Qty: 30 CAPSULE | Refills: 5 | Status: SHIPPED | OUTPATIENT
Start: 2020-07-30 | End: 2021-07-19

## 2020-07-30 RX ORDER — TRIAMCINOLONE ACETONIDE 1 MG/G
CREAM TOPICAL 2 TIMES DAILY
Qty: 80 G | Refills: 3 | Status: SHIPPED | OUTPATIENT
Start: 2020-07-30 | End: 2022-03-29

## 2020-09-03 RX ORDER — TOPIRAMATE 50 MG/1
50 TABLET, FILM COATED ORAL DAILY
Qty: 30 TABLET | Refills: 3 | Status: SHIPPED | OUTPATIENT
Start: 2020-09-03 | End: 2020-12-30

## 2020-09-04 ENCOUNTER — APPOINTMENT (OUTPATIENT)
Dept: ULTRASOUND IMAGING | Facility: HOSPITAL | Age: 25
End: 2020-09-04

## 2020-09-04 ENCOUNTER — HOSPITAL ENCOUNTER (EMERGENCY)
Facility: HOSPITAL | Age: 25
Discharge: HOME | End: 2020-09-04
Attending: EMERGENCY MEDICINE | Admitting: STUDENT IN AN ORGANIZED HEALTH CARE EDUCATION/TRAINING PROGRAM

## 2020-09-04 VITALS
BODY MASS INDEX: 48.82 KG/M2 | DIASTOLIC BLOOD PRESSURE: 84 MMHG | HEART RATE: 8 BPM | RESPIRATION RATE: 18 BRPM | WEIGHT: 293 LBS | HEIGHT: 65 IN | SYSTOLIC BLOOD PRESSURE: 129 MMHG | TEMPERATURE: 98 F | OXYGEN SATURATION: 98 %

## 2020-09-04 DIAGNOSIS — R93.89 THICKENED ENDOMETRIUM: Primary | ICD-10-CM

## 2020-09-04 LAB
B-HCG UR QL: NEGATIVE
BILIRUB UR QL STRIP: NEGATIVE
CLARITY UR: CLEAR
COLOR UR: YELLOW
GLUCOSE UR STRIP-MCNC: NEGATIVE MG/DL
HGB UR QL STRIP.AUTO: NEGATIVE
KETONES UR QL STRIP: NEGATIVE
LEUKOCYTE ESTERASE UR QL STRIP.AUTO: NEGATIVE
NITRITE UR QL STRIP: NEGATIVE
PH UR STRIP.AUTO: 7 [PH] (ref 5–8)
PROT UR QL STRIP: NEGATIVE
SP GR UR STRIP: 1.03 (ref 1–1.03)
UROBILINOGEN UR QL STRIP: NORMAL

## 2020-09-04 PROCEDURE — 99283 EMERGENCY DEPT VISIT LOW MDM: CPT

## 2020-09-04 PROCEDURE — 81003 URINALYSIS AUTO W/O SCOPE: CPT | Performed by: PHYSICIAN ASSISTANT

## 2020-09-04 PROCEDURE — 76856 US EXAM PELVIC COMPLETE: CPT

## 2020-09-04 PROCEDURE — 81025 URINE PREGNANCY TEST: CPT | Performed by: PHYSICIAN ASSISTANT

## 2020-09-04 RX ORDER — NAPROXEN 500 MG/1
500 TABLET ORAL 2 TIMES DAILY PRN
Qty: 14 TABLET | Refills: 0 | Status: SHIPPED | OUTPATIENT
Start: 2020-09-04 | End: 2020-12-18

## 2020-09-04 RX ORDER — NAPROXEN 500 MG/1
500 TABLET ORAL ONCE
Status: COMPLETED | OUTPATIENT
Start: 2020-09-04 | End: 2020-09-04

## 2020-09-04 RX ADMIN — NAPROXEN 500 MG: 500 TABLET ORAL at 21:36

## 2020-09-05 NOTE — ED PROVIDER NOTES
Subjective   25-year-old female presents with sharp vaginal pain.  She states she had a sudden onset of sharp vaginal pain approximately a few hours prior to arrival.  Is down her pelvis and vaginal area, is sharp in nature, no known injury, no urinary symptoms, no back pain, no nausea vomiting fever chills.      History provided by:  Patient   used: No        Review of Systems   Genitourinary: Positive for vaginal pain.   All other systems reviewed and are negative.      Past Medical History:   Diagnosis Date   • Abnormal uterine bleeding (AUB)    • Amenorrhea, secondary    • Anemia    • Anxiety and depression    • Gastroesophageal reflux disease    • Headache    • History of suicidal ideation 2017   • Low back pain    • Morbid obesity with BMI of 50.0-59.9, adult (CMS/[facility])    • Nightmares    • Obesity    • Rectal bleeding    • Recurrent major depressive disorder (CMS/[facility])    • 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       Allergies   Allergen Reactions   • Bee Venom Anaphylaxis   • Sertraline Hcl Other (See Comments)     Nightmares       Past Surgical History:   Procedure Laterality Date   • COLONOSCOPY N/A 5/8/2019    Procedure: COLONOSCOPY;  Surgeon: Alex Bartholomew MD;  Location: [facility] ENDOSCOPY;  Service: Gastroenterology   • D&C HYSTEROSCOPY N/A 2/11/2020    Procedure: DILATATION AND CURETTAGE DIAGNOSTIC HYSTEROSCOPY;  Surgeon: Niya Sun MD;  Location: [facility] OR;  Service: Obstetrics/Gynecology;  Laterality: N/A;   • WISDOM TOOTH EXTRACTION         Family History   Problem Relation Age of Onset   • Depression Mother    • Diabetes Mother    • Hyperlipidemia Mother    • Hypertension Mother    • Arthritis Mother    • Mental illness Mother    • Obesity Mother    • Arthritis Father    • COPD Father    • Depression Father    • Heart disease Father    • Hypertension Father    • Hyperlipidemia Father    • Stroke Father    • Obesity Father    • Mental illness Father    • Heart attack Father    •  Depression Maternal Aunt    • Diabetes Maternal Aunt    • Hypertension Maternal Aunt    • Mental illness Maternal Aunt    • Obesity Maternal Aunt    • Cancer Paternal Aunt    • Breast cancer Paternal Aunt    • Diabetes Other         Grandmother    • Mental illness Sister    • Mental illness Sister        Social History     Socioeconomic History   • Marital status:      Spouse name: Not on file   • Number of children: Not on file   • Years of education: Not on file   • Highest education level: Not on file   Tobacco Use   • Smoking status: Current Every Day Smoker     Packs/day: 1.00     Years: 0.50     Pack years: 0.50     Types: Cigarettes, Electronic Cigarette   • Smokeless tobacco: Never Used   Substance and Sexual Activity   • Alcohol use: Yes   • Drug use: No   • Sexual activity: Yes     Partners: Male     Birth control/protection: None           Objective   Physical Exam   Constitutional: She is oriented to person, place, and time. She appears well-developed and well-nourished.   HENT:   Head: Normocephalic.   Eyes: EOM are normal.   Neck: Normal range of motion. Neck supple.   Cardiovascular: Normal rate and regular rhythm.   Pulmonary/Chest: Effort normal and breath sounds normal.   Abdominal: Soft. Bowel sounds are normal.   Musculoskeletal: Normal range of motion.   Neurological: She is alert and oriented to person, place, and time. She has normal reflexes.   Skin: Skin is warm and dry.   Psychiatric: She has a normal mood and affect.   Nursing note and vitals reviewed.      Procedures           ED Course  ED Course as of Sep 04 2129   Fri Sep 04, 2020   2126 Patient has a history of thickened endometrium and cystic lesions in the past, she had a D&C in March, ultrasound shows similar results, there is recommend follow-up and repeat imaging.    [CS]      ED Course User Index  [CS] Levon Pichardo Jr., JAKE                                           MDM  Number of Diagnoses or Management  Options  Thickened endometrium:      Amount and/or Complexity of Data Reviewed  Clinical lab tests: reviewed  Tests in the radiology section of CPT®: reviewed        Final diagnoses:   Thickened endometrium            Levon Pichardo Jr., PA-C  09/04/20 9777

## 2020-09-06 ENCOUNTER — APPOINTMENT (OUTPATIENT)
Dept: CT IMAGING | Facility: HOSPITAL | Age: 25
End: 2020-09-06
Payer: COMMERCIAL

## 2020-09-06 ENCOUNTER — HOSPITAL ENCOUNTER (EMERGENCY)
Facility: HOSPITAL | Age: 25
Discharge: HOME | End: 2020-09-06
Attending: EMERGENCY MEDICINE
Payer: COMMERCIAL

## 2020-09-06 VITALS
TEMPERATURE: 98 F | RESPIRATION RATE: 18 BRPM | OXYGEN SATURATION: 97 % | HEART RATE: 80 BPM | WEIGHT: 293 LBS | BODY MASS INDEX: 48.82 KG/M2 | SYSTOLIC BLOOD PRESSURE: 133 MMHG | HEIGHT: 65 IN | DIASTOLIC BLOOD PRESSURE: 79 MMHG

## 2020-09-06 PROCEDURE — 99282 EMERGENCY DEPT VISIT SF MDM: CPT

## 2020-09-06 PROCEDURE — 74176 CT ABD & PELVIS W/O CONTRAST: CPT

## 2020-09-06 PROCEDURE — 6370000000 HC RX 637 (ALT 250 FOR IP): Performed by: EMERGENCY MEDICINE

## 2020-09-06 PROCEDURE — 99283 EMERGENCY DEPT VISIT LOW MDM: CPT

## 2020-09-06 RX ORDER — OXYCODONE HYDROCHLORIDE AND ACETAMINOPHEN 5; 325 MG/1; MG/1
2 TABLET ORAL ONCE
Status: COMPLETED | OUTPATIENT
Start: 2020-09-06 | End: 2020-09-06

## 2020-09-06 RX ORDER — HYDROCODONE BITARTRATE AND ACETAMINOPHEN 10; 325 MG/1; MG/1
1 TABLET ORAL EVERY 6 HOURS PRN
Qty: 12 TABLET | Refills: 0 | Status: SHIPPED | OUTPATIENT
Start: 2020-09-06 | End: 2020-09-09

## 2020-09-06 RX ADMIN — OXYCODONE HYDROCHLORIDE AND ACETAMINOPHEN 2 TABLET: 5; 325 TABLET ORAL at 22:16

## 2020-09-06 ASSESSMENT — PAIN SCALES - GENERAL
PAINLEVEL_OUTOF10: 9
PAINLEVEL_OUTOF10: 9

## 2020-09-06 ASSESSMENT — PAIN DESCRIPTION - PAIN TYPE: TYPE: ACUTE PAIN

## 2020-09-06 ASSESSMENT — PAIN DESCRIPTION - LOCATION: LOCATION: ABDOMEN;VAGINA

## 2020-09-06 ASSESSMENT — PAIN DESCRIPTION - DESCRIPTORS: DESCRIPTORS: CRAMPING

## 2020-09-07 NOTE — ED NOTES
Pt gone to CT at this time. Will finish triage when she is back.       Rigoberto Bolton RN  09/06/20 6026

## 2020-09-07 NOTE — ED PROVIDER NOTES
[address] [place] [address] Court  eMERGENCY dEPARTMENT eNCOUnter      Pt Name: Mi Hebert  MRN: [id number]  [place]: 1995  Date ofevaluation: 3625  Provider: Stacy Amin MD    [address] [place] [address]       Chief Complaint   Patient presents with    Vaginal Pain    Abdominal Cramping         HISTORY OF PRESENT ILLNESS  (Location/Symptom, Timing/Onset, Context/Setting, Quality, Duration, Modifying Factors, Severity.)   Mi Hebert is a 23 y.o. female who presents to the emergency department with pelvic pain which is cramping with \"sharp\" pains that extend into her vagina. No IUD. She feels as though her pain is much worse today than when it started 3 days ago. No radiation of her pain to her back. Bending forward helps the pain but no change with walking. She is a . She has a h/o thickened myometrium and had an US done 2 days ago at [facility] in [place] which confirmed the same. She had a D&C for this once and was referred back to Dr. Tyler Bunn but hasn't called her office yet. Her labs at [facility] in [place] were normal including a negative pregnancy test.    She was only given Naproxen for pain but says this has not helped her at all. Nursing notes were reviewed. REVIEW OF SYSTEMS    (2-9systems for level 4, 10 or more for level 5)   ROS:  General:  No fevers, no chills, no weakness  HEENT: No sore throat, runny nose or ear pain  Cardiovascular:  No chest pain, no palpitations  Respiratory:  No shortness of breath, no cough, no wheezing  Gastrointestinal:  + lower abdominal pain, no nausea, no vomiting, no diarrhea  Musculoskeletal:  No muscle pain, no joint pain  Skin:  No rash, no easy bruising  Genitourinary:  + vaginal pain. No dysuria, no hematuria    Except as noted above the remainder of the review of systems was reviewed and negative.        PASTMEDICAL HISTORY     Past Medical History:   Diagnosis Date    Anemia     Anxiety     [facility]     Depression     GERD (gastroesophageal reflux disease)     Hypothyroidism     OCD (obsessive compulsive disorder)     PCOS (polycystic ovarian syndrome)     Trichotillomania          SURGICAL HISTORY       Past Surgical History:   Procedure Laterality Date    WISDOM TOOTH EXTRACTION           CURRENT MEDICATIONS       Previous Medications    CIPROFLOXACIN (CIPRO) 500 MG TABLET    Take 1 tablet by mouth 2 times daily    FLUOXETINE (PROZAC) 20 MG CAPSULE    Take 1 capsule by mouth daily    TOPIRAMATE (TOPAMAX) 50 MG TABLET    Take 1 tablet by mouth daily    TRIAMCINOLONE (KENALOG) 0.1 % CREAM    Apply topically 2 times daily       ALLERGIES     Bee venom; Sertraline; and Zoloft    FAMILY HISTORY       Family History   Problem Relation Age of Onset    Diabetes Mother     Depression Mother     High Blood Pressure Father     Depression Sister     Depression Paternal Aunt     Cancer Paternal Aunt         lung    Diabetes Maternal Grandmother     Depression Maternal Grandmother     High Blood Pressure Maternal Grandmother     Diabetes Maternal Grandfather     High Blood Pressure Maternal Grandfather     Stroke Maternal Grandfather     Diabetes Paternal Grandmother     Cancer Paternal Grandfather         lung          SOCIAL HISTORY       Social History     Socioeconomic History    Marital status: Legally      Spouse name: None    Number of children: None    Years of education: None    Highest education level: None   Occupational History    None   Social Needs    Financial resource strain: None    Food insecurity     Worry: None     Inability: None    Transportation needs     Medical: None     Non-medical: None   Tobacco Use    Smoking status: Current Every Day Smoker     Packs/day: 0.50     Years: 2.00     Pack years: 1.00     Types: Cigarettes     Last attempt to quit: 2018     Years since quittin.7    Smokeless tobacco: Never Used   Substance and Sexual Activity    Alcohol use: No    Drug use: Not Currently     Types: Marijuana    Sexual activity: Yes   Lifestyle    Physical activity     Days per week: None     Minutes per session: None    Stress: None   Relationships    Social connections     Talks on phone: None     Gets together: None     Attends [employer] service: None     Active member of club or organization: None     Attends meetings of clubs or organizations: None     Relationship status: None    Intimate partner violence     Fear of current or ex partner: None     Emotionally abused: None     Physically abused: None     Forced sexual activity: None   Other Topics Concern    None   Social History Narrative    None         PHYSICAL EXAM    (up to 7 forlevel 4, 8 or more for level 5)     ED Triage Vitals   BP Temp Temp src Pulse Resp SpO2 Height Weight   -- -- -- -- -- -- -- --       Physical Exam  General :Patient is awake, alert, oriented, in no acute distress, nontoxic appearing  HEENT: Pupils are equally round and reactive to light, EOMI. Cardiac: Heart regular rate, rhythm, no murmurs, rubs, or gallops  Lungs: Lungs are clear to auscultation, there is no wheezing, rhonchi, or rales. Abdomen: Morbidly obese; Abdomen is soft, + suprapubic tenderness to palpation, nondistended. Musculoskeletal: Ambulatory  Back: No midline or bony tenderness. Dermatology: Skin is warm and dry  Psych: Mentation is grossly normal, cognition is grossly normal. Affect is appropriate. DIAGNOSTIC RESULTS       RADIOLOGY:   Non-plain film images such as CT, Ultrasoundand MRI are read by the radiologist. Plain radiographic images are visualized and preliminarily interpreted by the emergency physician with the below findings:      [x] Radiologist's Report Reviewed:  CT ABDOMEN PELVIS WO CONTRAST Additional Contrast? None   Final Result      1. No evidence of renal, ureteric, or bladder stones. No hydroureter or hydronephrosis.                ED BEDSIDE ULTRASOUND:   Performed by ED Physician - none    LABS:  Labs Reviewed - No data to display    I have reviewed and interpreted all of the currently available lab resultsfrom this visit (if applicable):  No results found for this visit on 09/06/20. All other labs were within normal range or not returned as of this dictation. EMERGENCY DEPARTMENT COURSE and DIFFERENTIAL DIAGNOSIS/MDM:   Vitals:    Vitals:    09/06/20 2212   BP: 133/79   Pulse: 80   Resp: 18   Temp: 98 °F (36.7 °C)   TempSrc: Oral   SpO2: 97%   Weight: (!) 326 lb (147.9 kg)   Height: 5' 5\" (1.651 m)       Patient given results of her CT and PO percocet. Will DC to home with instructions to followup with Dr. Ranjit Kam. The patient will follow-up with their PCP in 1-2 days for reevaluation. If the patient or family members have any further concerns or any worsening symptoms they will return to the ED for reevaluation. CONSULTS:  None    PROCEDURES:  Procedures    CRITICAL CARE TIME    Total Critical Care time was 0 minutes, excluding separately reportable procedures. There was a high probability of clinically significant/life threatening deterioration in the patient's condition which required my urgent intervention. FINAL IMPRESSION      1. Vaginal pain          DISPOSITION/PLAN   DISPOSITION        PATIENT REFERRED TO:  Daysi Denney  [address] [place] [address]  [phone]    Schedule an appointment as soon as possible for a visit in 1 day        DISCHARGE MEDICATIONS:  New Prescriptions    HYDROCODONE-ACETAMINOPHEN (NORCO)  MG PER TABLET    Take 1 tablet by mouth every 6 hours as needed for Pain for up to 3 days. Comment: Please note this report has been produced using speech recognition software and may contain errors related tothat system including errors in grammar, punctuation, and spelling, as well as words and phrases that may be inappropriate.  If there are any questions or concerns please feel free to contact the dictating provider forclarification.     Umang Desir MD  Attending Emergency Physician                  Umang Desir MD  09/06/20 9980

## 2020-09-23 ENCOUNTER — OFFICE VISIT (OUTPATIENT)
Dept: PRIMARY CARE CLINIC | Age: 25
End: 2020-09-23
Payer: COMMERCIAL

## 2020-09-23 VITALS
TEMPERATURE: 96.9 F | RESPIRATION RATE: 16 BRPM | OXYGEN SATURATION: 98 % | HEIGHT: 65 IN | SYSTOLIC BLOOD PRESSURE: 102 MMHG | WEIGHT: 293 LBS | BODY MASS INDEX: 48.82 KG/M2 | DIASTOLIC BLOOD PRESSURE: 62 MMHG | HEART RATE: 97 BPM

## 2020-09-23 PROCEDURE — 99213 OFFICE O/P EST LOW 20 MIN: CPT | Performed by: NURSE PRACTITIONER

## 2020-09-23 ASSESSMENT — ENCOUNTER SYMPTOMS
RESPIRATORY NEGATIVE: 1
GASTROINTESTINAL NEGATIVE: 1

## 2020-09-23 NOTE — PROGRESS NOTES
SUBJECTIVE:    Patient ID: Mi Hebert is a 23 y.o. female. Chief Complaint   Patient presents with    Vaginal Pain    Vaginal Bleeding         HPI:  Patient is here to follow up on ER visit for vaginal pain and bleeding. Patient had not had a menses since June. Patient started bleeding with small clots. She had the same problem in January with large clots and large vaginal bleeding. She was sen by Dr Tyler Bunn in February of this year. She was not put on any medication. She has been told that she has pcos. Patient is sexually active but not on birth control. She would like to be pregnant. Patient's medications,allergies, past medical, surgical, social and family histories were reviewed and updated as appropriate. .  Current Outpatient Medications on File Prior to Visit   Medication Sig Dispense Refill    topiramate (TOPAMAX) 50 MG tablet Take 1 tablet by mouth daily 30 tablet 3    triamcinolone (KENALOG) 0.1 % cream Apply topically 2 times daily 80 g 3    FLUoxetine (PROZAC) 20 MG capsule Take 1 capsule by mouth daily 30 capsule 5     No current facility-administered medications on file prior to visit. Review of Systems   Constitutional: Negative. HENT: Negative. Respiratory: Negative. Cardiovascular: Negative. Gastrointestinal: Negative. Genitourinary: Positive for dysuria and menstrual problem. Musculoskeletal: Negative. Skin: Negative. Neurological: Negative. Psychiatric/Behavioral: Negative. OBJECTIVE:  /62 (Site: Left Upper Arm, Position: Sitting, Cuff Size: Large Adult)   Pulse 97   Temp 96.9 °F (36.1 °C) (Temporal)   Resp 16   Ht 5' 5\" (1.651 m)   Wt (!) 353 lb (160.1 kg)   LMP 06/23/2020 Comment: abnormal menses  SpO2 98% Comment: room air  BMI 58.74 kg/m²    Physical Exam  Vitals signs and nursing note reviewed. Constitutional:       Appearance: She is well-developed. HENT:      Head: Normocephalic and atraumatic.    Eyes:

## 2020-09-23 NOTE — PROGRESS NOTES
Chief Complaint   Patient presents with    Vaginal Pain    Vaginal Bleeding       Have you seen any other physician or provider since your last visit yes - [address]  ER    Have you had any other diagnostic tests since your last visit? yes - labs and CT abdomen    Have you changed or stopped any medications since your last visit? no     I have recommended that this patient have a immunization for pneumonia but she declines at this time. I have discussed the risks and benefits of this examination with her. The patient verbalizes understanding. Diabetic retinal exam completed this year?  Yes                       * If yes please have patient sign a records release to obtain record to update Health Maintenance                       * If no, please order referral for patient to be scheduled

## 2020-09-24 RX ORDER — TOPIRAMATE 50 MG/1
TABLET, FILM COATED ORAL
Qty: 60 TABLET | Refills: 0 | OUTPATIENT
Start: 2020-09-24

## 2020-09-29 ENCOUNTER — TELEPHONE (OUTPATIENT)
Dept: PRIMARY CARE CLINIC | Age: 25
End: 2020-09-29

## 2020-09-30 NOTE — TELEPHONE ENCOUNTER
Her topamax was sent to [facility] so she wanted to clarify that her pharmacy is [facility] in [place] for all her rxs

## 2020-10-16 ENCOUNTER — OFFICE VISIT (OUTPATIENT)
Dept: PRIMARY CARE CLINIC | Age: 25
End: 2020-10-16
Payer: COMMERCIAL

## 2020-10-16 VITALS
DIASTOLIC BLOOD PRESSURE: 72 MMHG | OXYGEN SATURATION: 98 % | SYSTOLIC BLOOD PRESSURE: 110 MMHG | HEART RATE: 89 BPM | TEMPERATURE: 97.8 F | RESPIRATION RATE: 18 BRPM

## 2020-10-16 LAB
BILIRUBIN, POC: NORMAL
BLOOD URINE, POC: NORMAL
CLARITY, POC: NORMAL
COLOR, POC: NORMAL
GLUCOSE URINE, POC: NORMAL
KETONES, POC: NORMAL
LEUKOCYTE EST, POC: NORMAL
NITRITE, POC: NORMAL
PH, POC: 6
PROTEIN, POC: NORMAL
SPECIFIC GRAVITY, POC: 1.03
UROBILINOGEN, POC: 0.2

## 2020-10-16 PROCEDURE — 99213 OFFICE O/P EST LOW 20 MIN: CPT | Performed by: NURSE PRACTITIONER

## 2020-10-16 PROCEDURE — 81002 URINALYSIS NONAUTO W/O SCOPE: CPT | Performed by: NURSE PRACTITIONER

## 2020-10-16 RX ORDER — FLUCONAZOLE 100 MG/1
100 TABLET ORAL DAILY
Qty: 3 TABLET | Refills: 0 | Status: SHIPPED | OUTPATIENT
Start: 2020-10-16 | End: 2020-10-19

## 2020-10-16 RX ORDER — CEPHALEXIN 500 MG/1
500 CAPSULE ORAL 2 TIMES DAILY
Qty: 14 CAPSULE | Refills: 0 | Status: SHIPPED | OUTPATIENT
Start: 2020-10-16 | End: 2020-10-23

## 2020-10-16 ASSESSMENT — ENCOUNTER SYMPTOMS
DIARRHEA: 0
EYES NEGATIVE: 1
RECTAL PAIN: 0
VOMITING: 0
NAUSEA: 0

## 2020-10-16 NOTE — PROGRESS NOTES
This is a 23 y.o. female who presents with   Chief Complaint   Patient presents with    Dysuria       SUBJECTIVE:     uti symptoms, no fever or vomiting         Current Outpatient Medications   Medication Sig Dispense Refill    cephALEXin (KEFLEX) 500 MG capsule Take 1 capsule by mouth 2 times daily for 7 days 14 capsule 0    fluconazole (DIFLUCAN) 100 MG tablet Take 1 tablet by mouth daily for 3 days 3 tablet 0    norgestrel-ethinyl estradiol (LO/OVRAL) 0.3-30 MG-MCG per tablet Take 1 tablet by mouth daily 1 packet 3    topiramate (TOPAMAX) 50 MG tablet Take 1 tablet by mouth daily 30 tablet 3    triamcinolone (KENALOG) 0.1 % cream Apply topically 2 times daily 80 g 3    FLUoxetine (PROZAC) 20 MG capsule Take 1 capsule by mouth daily 30 capsule 5     No current facility-administered medications for this visit. Allergies   Allergen Reactions    Bee Venom Anaphylaxis    Sertraline     Zoloft Other (See Comments)     Nightmares         Review of Systems   Constitutional: Negative. Eyes: Negative. Gastrointestinal: Negative for diarrhea, nausea, rectal pain and vomiting. Genitourinary: Positive for urgency. Negative for decreased urine volume and pelvic pain. Neurological: Negative. All other systems reviewed and are negative.        OBJECTIVE:     /72   Pulse 89   Temp 97.8 °F (36.6 °C)   Resp 18   SpO2 98%      Physical Exam    Orders Placed This Encounter   Medications    cephALEXin (KEFLEX) 500 MG capsule     Sig: Take 1 capsule by mouth 2 times daily for 7 days     Dispense:  14 capsule     Refill:  0    fluconazole (DIFLUCAN) 100 MG tablet     Sig: Take 1 tablet by mouth daily for 3 days     Dispense:  3 tablet     Refill:  0        Orders Placed This Encounter   Medications    cephALEXin (KEFLEX) 500 MG capsule     Sig: Take 1 capsule by mouth 2 times daily for 7 days     Dispense:  14 capsule     Refill:  0    fluconazole (DIFLUCAN) 100 MG tablet     Sig: Take 1 tablet by mouth daily for 3 days     Dispense:  3 tablet     Refill:  0       ASSESSMENT/PLAN  1. uti  Keflex,  Diflucan,  Work excuse    No follow-ups on file.

## 2020-10-16 NOTE — PROGRESS NOTES
Pt co burning urination and vaginal itching x 2 days, she just got off of her menstrual cycle states she normally gets uti when she gets off it. Pt has not tried any meds. HM not addressed acute visit.

## 2020-11-22 ENCOUNTER — HOSPITAL ENCOUNTER (EMERGENCY)
Facility: HOSPITAL | Age: 25
Discharge: HOME | End: 2020-11-22
Attending: EMERGENCY MEDICINE | Admitting: EMERGENCY MEDICINE

## 2020-11-22 ENCOUNTER — APPOINTMENT (OUTPATIENT)
Dept: CT IMAGING | Facility: HOSPITAL | Age: 25
End: 2020-11-22

## 2020-11-22 VITALS
BODY MASS INDEX: 48.82 KG/M2 | TEMPERATURE: 98.7 F | HEART RATE: 85 BPM | OXYGEN SATURATION: 96 % | RESPIRATION RATE: 17 BRPM | HEIGHT: 65 IN | SYSTOLIC BLOOD PRESSURE: 126 MMHG | DIASTOLIC BLOOD PRESSURE: 61 MMHG | WEIGHT: 293 LBS

## 2020-11-22 DIAGNOSIS — G24.5 EYE TWITCH: Primary | ICD-10-CM

## 2020-11-22 PROCEDURE — 70450 CT HEAD/BRAIN W/O DYE: CPT

## 2020-11-22 PROCEDURE — 99282 EMERGENCY DEPT VISIT SF MDM: CPT

## 2020-11-22 NOTE — ED PROVIDER NOTES
Subjective   This patient comes in for evaluation of an episode that occurred last night while lying in bed.  She states she rolled over onto her abdomen and then for about a 3-minute.  She was unable to move.  She denies any complaints at that time to include loss consciousness, headache, chest pain, short of breath, reji pain.  She states she was able to turn her head slightly to the side to prevent from smothering herself in the pillow however she was unable to roll over and right herself.  She states since she has had some intermittent twitching in her left eye none now.  No headache or visual changes or twitching in her right this time.  Currently she has no focal complaints.  She states this happened once a year ago and was seen at an outlying hospital and told it might have been night terrors.  She has no history of seizures but she does take Topamax for mood stabilization          Review of Systems   Constitutional: Negative.    HENT: Negative.    Eyes: Negative.    Respiratory: Negative.    Cardiovascular: Negative.    Gastrointestinal: Negative.    Genitourinary: Negative.    Musculoskeletal: Negative.    Skin: Negative.    Neurological:        Brief episode of paralysis with left eye twitching   Psychiatric/Behavioral: Negative.        Past Medical History:   Diagnosis Date   • Abnormal uterine bleeding (AUB)    • Amenorrhea, secondary    • Anemia    • Anxiety and depression    • Gastroesophageal reflux disease    • Headache    • History of suicidal ideation 2017   • Low back pain    • Morbid obesity with BMI of 50.0-59.9, adult (CMS/[facility])    • Nightmares    • Obesity    • Rectal bleeding    • Recurrent major depressive disorder (CMS/[facility])    • 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       Allergies   Allergen Reactions   • Bee Venom Anaphylaxis   • Sertraline Hcl Other (See Comments)     Nightmares       Past Surgical History:   Procedure Laterality Date   • COLONOSCOPY N/A 5/8/2019    Procedure: COLONOSCOPY;   Surgeon: Alex Bartholomew MD;  Location: [facility] ENDOSCOPY;  Service: Gastroenterology   • D&C HYSTEROSCOPY N/A 2/11/2020    Procedure: DILATATION AND CURETTAGE DIAGNOSTIC HYSTEROSCOPY;  Surgeon: Niya Sun MD;  Location: [facility] OR;  Service: Obstetrics/Gynecology;  Laterality: N/A;   • WISDOM TOOTH EXTRACTION         Family History   Problem Relation Age of Onset   • Depression Mother    • Diabetes Mother    • Hyperlipidemia Mother    • Hypertension Mother    • Arthritis Mother    • Mental illness Mother    • Obesity Mother    • Arthritis Father    • COPD Father    • Depression Father    • Heart disease Father    • Hypertension Father    • Hyperlipidemia Father    • Stroke Father    • Obesity Father    • Mental illness Father    • Heart attack Father    • Depression Maternal Aunt    • Diabetes Maternal Aunt    • Hypertension Maternal Aunt    • Mental illness Maternal Aunt    • Obesity Maternal Aunt    • Cancer Paternal Aunt    • Breast cancer Paternal Aunt    • Diabetes Other         Grandmother    • Mental illness Sister    • Mental illness Sister        Social History     Socioeconomic History   • Marital status:      Spouse name: Not on file   • Number of children: Not on file   • Years of education: Not on file   • Highest education level: Not on file   Tobacco Use   • Smoking status: Current Every Day Smoker     Packs/day: 1.00     Years: 0.50     Pack years: 0.50     Types: Cigarettes, Electronic Cigarette   • Smokeless tobacco: Never Used   Substance and Sexual Activity   • Alcohol use: Yes   • Drug use: No   • Sexual activity: Yes     Partners: Male     Birth control/protection: None           Objective   Physical Exam  Vitals signs and nursing note reviewed.   Constitutional:       Appearance: Normal appearance. She is obese.   HENT:      Head: Normocephalic and atraumatic.      Mouth/Throat:      Mouth: Mucous membranes are moist.      Pharynx: Oropharynx is clear.   Eyes:      Pupils: Pupils  are equal, round, and reactive to light.   Neurological:      Mental Status: She is alert.         Procedures           ED Course                                           MDM    Final diagnoses:   Eye twitch            Wing Mejía PA-C  11/22/20 6902

## 2020-11-24 ENCOUNTER — TELEPHONE (OUTPATIENT)
Dept: NEUROLOGY | Facility: CLINIC | Age: 25
End: 2020-11-24

## 2020-11-24 NOTE — TELEPHONE ENCOUNTER
Although eye twitching is not a neurological issue I can see her for the abnormal episode for which she went to the ED for. You can schedule her. Thank you.

## 2020-11-24 NOTE — TELEPHONE ENCOUNTER
PT WAS SEEN IN [facility] ED ON 11- FOR EYE TWITCHING AND IS TO Follow-Ups: Schedule an appointment with Guadalupe Rowland APRN (Nurse Practitioner)  PLEASE ADVISE F/U APPT TIME FRAME.    PT CAN BE CALLED BACK [phone]

## 2020-11-25 NOTE — TELEPHONE ENCOUNTER
Attempted to contact patient to schedule appointment.     No answer when calling, left message for patient to return call.

## 2020-12-18 ENCOUNTER — TELEMEDICINE (OUTPATIENT)
Dept: FAMILY MEDICINE CLINIC | Facility: TELEHEALTH | Age: 25
End: 2020-12-18

## 2020-12-18 ENCOUNTER — E-VISIT (OUTPATIENT)
Dept: FAMILY MEDICINE CLINIC | Facility: TELEHEALTH | Age: 25
End: 2020-12-18

## 2020-12-18 DIAGNOSIS — R39.89 SUSPECTED UTI: Primary | ICD-10-CM

## 2020-12-18 DIAGNOSIS — N89.8 VAGINAL DISCHARGE: ICD-10-CM

## 2020-12-18 PROCEDURE — 99213 OFFICE O/P EST LOW 20 MIN: CPT | Performed by: NURSE PRACTITIONER

## 2020-12-18 RX ORDER — SULFAMETHOXAZOLE AND TRIMETHOPRIM 800; 160 MG/1; MG/1
1 TABLET ORAL 2 TIMES DAILY
Qty: 10 TABLET | Refills: 0 | Status: SHIPPED | OUTPATIENT
Start: 2020-12-18 | End: 2020-12-23

## 2020-12-18 RX ORDER — FLUCONAZOLE 150 MG/1
150 TABLET ORAL ONCE
Qty: 1 TABLET | Refills: 0 | Status: SHIPPED | OUTPATIENT
Start: 2020-12-18 | End: 2020-12-18

## 2020-12-18 NOTE — PATIENT INSTRUCTIONS
Sulfamethoxazole; Trimethoprim, SMX-TMP tablets  What is this medicine?  SULFAMETHOXAZOLE; TRIMETHOPRIM or SMX-TMP (suhl fuh meth OK nilda zohl; trye METH oh prim) is a combination of a sulfonamide antibiotic and a second antibiotic, trimethoprim. It is used to treat or prevent certain kinds of bacterial infections. It will not work for colds, flu, or other viral infections.  This medicine may be used for other purposes; ask your health care provider or pharmacist if you have questions.  COMMON BRAND NAME(S): Bacter-Aid DS, Bactrim, Bactrim DS, Septra, Septra DS  What should I tell my health care provider before I take this medicine?  They need to know if you have any of these conditions:  · G6PD deficiency  · HIV or AIDS  · kidney disease  · liver disease  · low platelets  · low red blood cell counts  · poor nutrition  · stomach or intestine problems like colitis  · thyroid disease  · an unusual or allergic reaction to sulfamethoxazole, trimethoprim, sulfa drugs, other drugs, foods, dyes, or preservatives  · pregnant or trying to get pregnant  · breast-feeding  How should I use this medicine?  Take this medicine by mouth with a glass of water. Follow the directions on the prescription label. Take your medicine at regular intervals. Do not take it more often than directed. Take all of your medicine as directed even if you think you are better. Do not skip doses or stop your medicine early.  Talk to your pediatrician regarding the use of this medicine in children. While this drug may be prescribed for children as young as 2 months for selected conditions, precautions do apply.  Overdosage: If you think you have taken too much of this medicine contact a poison control center or emergency room at once.  NOTE: This medicine is only for you. Do not share this medicine with others.  What if I miss a dose?  If you miss a dose, take it as soon as you can. If it is almost time for your next dose, take only that dose. Do not  take double or extra doses.  What may interact with this medicine?  Do not take this medicine with any of the following medications:  · dofetilide  This medicine may also interact with the following medications:  · amantadine  · birth control pills  · certain medicines for blood pressure, heart disease  · certain medicines for depression, like amitriptyline  · certain medicines for diabetes, like glipizide or glyburide  · certain medicines that treat or prevent blood clots like warfarin  · cyclosporine  · digoxin  · diuretics  · indomethacin  · methotrexate  · phenytoin  · procainamide  · pyrimethamine  · zidovudine  This list may not describe all possible interactions. Give your health care provider a list of all the medicines, herbs, non-prescription drugs, or dietary supplements you use. Also tell them if you smoke, drink alcohol, or use illegal drugs. Some items may interact with your medicine.  What should I watch for while using this medicine?  Tell your health care provider if your symptoms do not start to get better or if they get worse.  Do not treat diarrhea with over the counter products. Contact your health care provider if you have diarrhea that lasts more than 2 days or if it is severe and watery.  This drug may cause serious skin reactions. They can happen weeks to months after starting the drug. Contact your health care provider right away if you notice fevers or flu-like symptoms with a rash. The rash may be red or purple and then turn into blisters or peeling of the skin. Or, you might notice a red rash with swelling of the face, lips or lymph nodes in your neck or under your arms.  This drug can make you more sensitive to the sun. Keep out of the sun. If you cannot avoid being in the sun, wear protective clothing and sunscreen. Do not use sun lamps or tanning beds/booths.  Be careful brushing or flossing your teeth or using a toothpick because you may get an infection or bleed more easily. If you  have any dental work done, tell your dentist you are receiving this drug.  What side effects may I notice from receiving this medicine?  Side effects that you should report to your doctor or health care professional as soon as possible:  · allergic reactions (skin rash, itching or hives; swelling of the face, lips, or tongue)  · bloody or watery diarrhea  · heartbeat rhythm changes (trouble breathing; chest pain; dizziness; fast, irregular heartbeat; feeling faint or lightheaded, falls,)  · fever  · high potassium levels (chest pain; fast, irregular heartbeat; muscle weakness)  · kidney injury (trouble passing urine or change in the amount of urine)  · low blood sugar (feeling anxious; confusion; dizziness; increased hunger; unusually weak or tired; increased sweating; shakiness; cold, clammy skin; irritable; headache; blurred vision; fast heartbeat; loss of consciousness)  · low red blood cell counts (trouble breathing; feeling faint; lightheaded, falls; unusually weak or tired)  · rash, fever, and swollen lymph nodes  · redness, blistering, peeling, or loosening of the skin, including inside the mouth  · unusual bruising or bleeding  Side effects that usually do not require medical attention (report to your doctor or health care professional if they continue or are bothersome):  · loss of appetite  · nausea  · vomiting  This list may not describe all possible side effects. Call your doctor for medical advice about side effects. You may report side effects to FDA at 9-384-FDA-4700.  Where should I keep my medicine?  Keep out of the reach of children.  Store between 15 and 25 degrees C (59 to 77 degrees F). Protect from light. Keep the container tightly closed. Throw away any unused drug after the expiration date.  NOTE: This sheet is a summary. It may not cover all possible information. If you have questions about this medicine, talk to your doctor, pharmacist, or health care provider.  © 2020 [employer] (2020-09-04 12:53:51)  Fluconazole tablets  What is this medicine?  FLUCONAZOLE (floo MCKAY na zole) is an antifungal medicine. It is used to treat certain kinds of fungal or yeast infections.  This medicine may be used for other purposes; ask your health care provider or pharmacist if you have questions.  COMMON BRAND NAME(S): Diflucan  What should I tell my health care provider before I take this medicine?  They need to know if you have any of these conditions:  · history of irregular heart beat  · kidney disease  · an unusual or allergic reaction to fluconazole, other azole antifungals, medicines, foods, dyes, or preservatives  · pregnant or trying to get pregnant  · breast-feeding  How should I use this medicine?  Take this medicine by mouth. Follow the directions on the prescription label. Do not take your medicine more often than directed.  Talk to your pediatrician regarding the use of this medicine in children. Special care may be needed. This medicine has been used in children as young as 6 months of age.  Overdosage: If you think you have taken too much of this medicine contact a poison control center or emergency room at once.  NOTE: This medicine is only for you. Do not share this medicine with others.  What if I miss a dose?  If you miss a dose, take it as soon as you can. If it is almost time for your next dose, take only that dose. Do not take double or extra doses.  What may interact with this medicine?  Do not take this medicine with any of the following medications:  · astemizole  · certain medicines for irregular heart beat like dronedarone, quinidine  · cisapride  · erythromycin  · lomitapide  · other medicines that prolong the QT interval (cause an abnormal heart rhythm)  · pimozide  · terfenadine  · thioridazine  This medicine may also interact with the following medications:  · antiviral medicines for HIV or AIDS  · birth control pills  · certain antibiotics like rifabutin,  rifampin  · certain medicines for blood pressure like amlodipine, isradipine, felodipine, hydrochlorothiazide, losartan, nifedipine  · certain medicines for cancer like cyclophosphamide, ibrutinib, vinblastine, vincristine  · certain medicines for cholesterol like atorvastatin, lovastatin, fluvastatin, simvastatin  · certain medicines for depression, anxiety, or psychotic disturbances like amitriptyline, midazolam, nortriptyline, triazolam  · certain medicines for diabetes like glipizide, glyburide, tolbutamide  · certain medicines for pain like alfentanil, fentanyl, methadone  · certain medicines for seizures like carbamazepine, phenytoin  · certain medicines that treat or prevent blood clots like warfarin  · dofetilide  · halofantrine  · medicines that lower your chance of fighting infection like cyclosporine, prednisone, tacrolimus  · NSAIDS, medicines for pain and inflammation, like celecoxib, diclofenac, flurbiprofen, ibuprofen, meloxicam, naproxen  · other medicines for fungal infections  · sirolimus  · theophylline  · tofacitinib  · tolvaptan  · ziprasidone  This list may not describe all possible interactions. Give your health care provider a list of all the medicines, herbs, non-prescription drugs, or dietary supplements you use. Also tell them if you smoke, drink alcohol, or use illegal drugs. Some items may interact with your medicine.  What should I watch for while using this medicine?  Visit your doctor or health care professional for regular checkups. If you are taking this medicine for a long time you may need blood work. Tell your doctor if your symptoms do not improve. Some fungal infections need many weeks or months of treatment to cure.  Alcohol can increase possible damage to your liver. Avoid alcoholic drinks.  If you have a vaginal infection, do not have sex until you have finished your treatment. You can wear a sanitary napkin. Do not use tampons. Wear freshly washed cotton, not synthetic,  panties.  What side effects may I notice from receiving this medicine?  Side effects that you should report to your doctor or health care professional as soon as possible:  · allergic reactions like skin rash or itching, hives, swelling of the lips, mouth, tongue, or throat  · dark urine  · feeling dizzy or faint  · irregular heartbeat or chest pain  · redness, blistering, peeling or loosening of the skin, including inside the mouth  · trouble breathing  · unusual bruising or bleeding  · vomiting  · yellowing of the eyes or skin  Side effects that usually do not require medical attention (report to your doctor or health care professional if they continue or are bothersome):  · changes in how food tastes  · diarrhea  · headache  · stomach upset or nausea  This list may not describe all possible side effects. Call your doctor for medical advice about side effects. You may report side effects to FDA at 5-676-FDA-9540.  Where should I keep my medicine?  Keep out of the reach of children.  Store at room temperature below 30 degrees C (86 degrees F). Throw away any medicine after the expiration date.  NOTE: This sheet is a summary. It may not cover all possible information. If you have questions about this medicine, talk to your doctor, pharmacist, or health care provider.  © 2020 [employer] (2020-09-10 11:41:56)  Vaginal Yeast Infection, Adult    Vaginal yeast infection is a condition that causes vaginal discharge as well as soreness, swelling, and redness (inflammation) of the vagina. This is a common condition. Some women get this infection frequently.  What are the causes?  This condition is caused by a change in the normal balance of the yeast (candida) and bacteria that live in the vagina. This change causes an overgrowth of yeast, which causes the inflammation.  What increases the risk?  The condition is more likely to develop in women who:  · Take antibiotic medicines.  · Have diabetes.  · Take birth  control pills.  · Are pregnant.  · Douche often.  · Have a weak body defense system (immune system).  · Have been taking steroid medicines for a long time.  · Frequently wear tight clothing.  What are the signs or symptoms?  Symptoms of this condition include:  · White, thick, creamy vaginal discharge.  · Swelling, itching, redness, and irritation of the vagina. The lips of the vagina (vulva) may be affected as well.  · Pain or a burning feeling while urinating.  · Pain during sex.  How is this diagnosed?  This condition is diagnosed based on:  · Your medical history.  · A physical exam.  · A pelvic exam. Your health care provider will examine a sample of your vaginal discharge under a microscope. Your health care provider may send this sample for testing to confirm the diagnosis.  How is this treated?  This condition is treated with medicine. Medicines may be over-the-counter or prescription. You may be told to use one or more of the following:  · Medicine that is taken by mouth (orally).  · Medicine that is applied as a cream (topically).  · Medicine that is inserted directly into the vagina (suppository).  Follow these instructions at home:    Lifestyle  · Do not have sex until your health care provider approves. Tell your sex partner that you have a yeast infection. That person should go to his or her health care provider and ask if they should also be treated.  · Do not wear tight clothes, such as pantyhose or tight pants.  · Wear breathable cotton underwear.  General instructions  · Take or apply over-the-counter and prescription medicines only as told by your health care provider.  · Eat more yogurt. This may help to keep your yeast infection from returning.  · Do not use tampons until your health care provider approves.  · Try taking a sitz bath to help with discomfort. This is a warm water bath that is taken while you are sitting down. The water should only come up to your hips and should cover your  buttocks. Do this 3-4 times per day or as told by your health care provider.  · Do not douche.  · If you have diabetes, keep your blood sugar levels under control.  · Keep all follow-up visits as told by your health care provider. This is important.  Contact a health care provider if:  · You have a fever.  · Your symptoms go away and then return.  · Your symptoms do not get better with treatment.  · Your symptoms get worse.  · You have new symptoms.  · You develop blisters in or around your vagina.  · You have blood coming from your vagina and it is not your menstrual period.  · You develop pain in your abdomen.  Summary  · Vaginal yeast infection is a condition that causes discharge as well as soreness, swelling, and redness (inflammation) of the vagina.  · This condition is treated with medicine. Medicines may be over-the-counter or prescription.  · Take or apply over-the-counter and prescription medicines only as told by your health care provider.  · Do not douche. Do not have sex or use tampons until your health care provider approves.  · Contact a health care provider if your symptoms do not get better with treatment or your symptoms go away and then return.  This information is not intended to replace advice given to you by your health care provider. Make sure you discuss any questions you have with your health care provider.  Document Revised: 07/17/2020 Document Reviewed: 05/06/2019  [employer] Patient Education © 2020 [employer]  Urinary Tract Infection, Adult  A urinary tract infection (UTI) is an infection of any part of the urinary tract. The urinary tract includes:  · The kidneys.  · The ureters.  · The bladder.  · The urethra.  These organs make, store, and get rid of pee (urine) in the body.  What are the causes?  This is caused by germs (bacteria) in your genital area. These germs grow and cause swelling (inflammation) of your urinary tract.  What increases the risk?  You are more likely to develop  this condition if:  · You have a small, thin tube (catheter) to drain pee.  · You cannot control when you pee or poop (incontinence).  · You are female, and:  ? You use these methods to prevent pregnancy:  § A medicine that kills sperm (spermicide).  § A device that blocks sperm (diaphragm).  ? You have low levels of a female hormone (estrogen).  ? You are pregnant.  · You have genes that add to your risk.  · You are sexually active.  · You take antibiotic medicines.  · You have trouble peeing because of:  ? A prostate that is bigger than normal, if you are male.  ? A blockage in the part of your body that drains pee from the bladder (urethra).  ? A kidney stone.  ? A nerve condition that affects your bladder (neurogenic bladder).  ? Not getting enough to drink.  ? Not peeing often enough.  · You have other conditions, such as:  ? Diabetes.  ? A weak disease-fighting system (immune system).  ? Sickle cell disease.  ? Gout.  ? Injury of the spine.  What are the signs or symptoms?  Symptoms of this condition include:  · Needing to pee right away (urgently).  · Peeing often.  · Peeing small amounts often.  · Pain or burning when peeing.  · Blood in the pee.  · Pee that smells bad or not like normal.  · Trouble peeing.  · Pee that is cloudy.  · Fluid coming from the vagina, if you are female.  · Pain in the belly or lower back.  Other symptoms include:  · Throwing up (vomiting).  · No urge to eat.  · Feeling mixed up (confused).  · Being tired and grouchy (irritable).  · A fever.  · Watery poop (diarrhea).  How is this treated?  This condition may be treated with:  · Antibiotic medicine.  · Other medicines.  · Drinking enough water.  Follow these instructions at home:    Medicines  · Take over-the-counter and prescription medicines only as told by your doctor.  · If you were prescribed an antibiotic medicine, take it as told by your doctor. Do not stop taking it even if you start to feel better.  General  instructions  · Make sure you:  ? Pee until your bladder is empty.  ? Do not hold pee for a long time.  ? Empty your bladder after sex.  ? Wipe from front to back after pooping if you are a female. Use each tissue one time when you wipe.  · Drink enough fluid to keep your pee pale yellow.  · Keep all follow-up visits as told by your doctor. This is important.  Contact a doctor if:  · You do not get better after 1-2 days.  · Your symptoms go away and then come back.  Get help right away if:  · You have very bad back pain.  · You have very bad pain in your lower belly.  · You have a fever.  · You are sick to your stomach (nauseous).  · You are throwing up.  Summary  · A urinary tract infection (UTI) is an infection of any part of the urinary tract.  · This condition is caused by germs in your genital area.  · There are many risk factors for a UTI. These include having a small, thin tube to drain pee and not being able to control when you pee or poop.  · Treatment includes antibiotic medicines for germs.  · Drink enough fluid to keep your pee pale yellow.  This information is not intended to replace advice given to you by your health care provider. Make sure you discuss any questions you have with your health care provider.  Document Revised: 12/05/2019 Document Reviewed: 06/27/2019  [employer] Patient Education © 2020 [employer]

## 2020-12-18 NOTE — PROGRESS NOTES
Subjective   Gracie Clemente is a 25 y.o. female.     Her symptoms started a week ago with low back pain. Yesterday she noticed her urine was cloudy and orange. She says she gets frequent UTIs around the time of her period. Her period started today. Denies fever, fatigue, frequency. She has a sensation of incomplete emptying. About a week ago she noticed some vaginal discharge. The discharge is white and thick with some itching.        The following portions of the patient's history were reviewed and updated as appropriate: allergies, current medications, past family history, past medical history, past social history, past surgical history and problem list.    Review of Systems   Constitutional: Negative for chills, diaphoresis, fatigue and fever.   Gastrointestinal: Negative for abdominal pain.   Genitourinary: Positive for vaginal bleeding and vaginal discharge. Negative for flank pain and frequency.   Musculoskeletal: Positive for back pain.       Objective   Physical Exam  The patient declined to turn on her camera. No physical exam performed.    Assessment/Plan   Diagnoses and all orders for this visit:    1. Suspected UTI (Primary)  -     sulfamethoxazole-trimethoprim (Bactrim DS) 800-160 MG per tablet; Take 1 tablet by mouth 2 (Two) Times a Day for 5 days.  Dispense: 10 tablet; Refill: 0    2. Vaginal discharge  -     fluconazole (Diflucan) 150 MG tablet; Take 1 tablet by mouth 1 (One) Time for 1 dose.  Dispense: 1 tablet; Refill: 0        I spent 15 minutes in the patient's chart for this video visit.

## 2020-12-30 RX ORDER — TOPIRAMATE 50 MG/1
TABLET, FILM COATED ORAL
Qty: 60 TABLET | Refills: 0 | Status: SHIPPED | OUTPATIENT
Start: 2020-12-30 | End: 2021-04-14 | Stop reason: SDUPTHER

## 2021-01-15 ENCOUNTER — APPOINTMENT (OUTPATIENT)
Dept: GENERAL RADIOLOGY | Facility: HOSPITAL | Age: 26
End: 2021-01-15

## 2021-01-15 ENCOUNTER — HOSPITAL ENCOUNTER (EMERGENCY)
Facility: HOSPITAL | Age: 26
Discharge: HOME | End: 2021-01-15
Attending: EMERGENCY MEDICINE | Admitting: EMERGENCY MEDICINE

## 2021-01-15 VITALS
SYSTOLIC BLOOD PRESSURE: 149 MMHG | OXYGEN SATURATION: 97 % | TEMPERATURE: 97.9 F | HEART RATE: 93 BPM | RESPIRATION RATE: 18 BRPM | WEIGHT: 293 LBS | BODY MASS INDEX: 48.82 KG/M2 | DIASTOLIC BLOOD PRESSURE: 87 MMHG | HEIGHT: 65 IN

## 2021-01-15 DIAGNOSIS — R07.1 CHEST PAIN VARYING WITH BREATHING: Primary | ICD-10-CM

## 2021-01-15 LAB
ALBUMIN SERPL-MCNC: 4 G/DL (ref 3.5–5.2)
ALBUMIN/GLOB SERPL: 1 G/DL
ALP SERPL-CCNC: 81 U/L (ref 39–117)
ALT SERPL W P-5'-P-CCNC: 18 U/L (ref 1–33)
ANION GAP SERPL CALCULATED.3IONS-SCNC: 11.8 MMOL/L (ref 5–15)
AST SERPL-CCNC: 25 U/L (ref 1–32)
B-HCG UR QL: NEGATIVE
BASOPHILS # BLD AUTO: 0.09 10*3/MM3 (ref 0–0.2)
BASOPHILS NFR BLD AUTO: 0.7 % (ref 0–1.5)
BILIRUB SERPL-MCNC: 0.3 MG/DL (ref 0–1.2)
BUN SERPL-MCNC: 10 MG/DL (ref 6–20)
BUN/CREAT SERPL: 15.9 (ref 7–25)
CALCIUM SPEC-SCNC: 9.4 MG/DL (ref 8.6–10.5)
CHLORIDE SERPL-SCNC: 103 MMOL/L (ref 98–107)
CO2 SERPL-SCNC: 25.2 MMOL/L (ref 22–29)
CREAT SERPL-MCNC: 0.63 MG/DL (ref 0.57–1)
D DIMER PPP FEU-MCNC: 0.34 MCGFEU/ML (ref 0–0.57)
DEPRECATED RDW RBC AUTO: 41.7 FL (ref 37–54)
EOSINOPHIL # BLD AUTO: 0.39 10*3/MM3 (ref 0–0.4)
EOSINOPHIL NFR BLD AUTO: 3.1 % (ref 0.3–6.2)
ERYTHROCYTE [DISTWIDTH] IN BLOOD BY AUTOMATED COUNT: 13 % (ref 12.3–15.4)
GFR SERPL CREATININE-BSD FRML MDRD: 117 ML/MIN/1.73
GLOBULIN UR ELPH-MCNC: 4.2 GM/DL
GLUCOSE SERPL-MCNC: 99 MG/DL (ref 65–99)
HCT VFR BLD AUTO: 45.9 % (ref 34–46.6)
HGB BLD-MCNC: 14.7 G/DL (ref 12–15.9)
HOLD SPECIMEN: NORMAL
IMM GRANULOCYTES # BLD AUTO: 0.05 10*3/MM3 (ref 0–0.05)
IMM GRANULOCYTES NFR BLD AUTO: 0.4 % (ref 0–0.5)
LIPASE SERPL-CCNC: 24 U/L (ref 13–60)
LYMPHOCYTES # BLD AUTO: 3.1 10*3/MM3 (ref 0.7–3.1)
LYMPHOCYTES NFR BLD AUTO: 24.6 % (ref 19.6–45.3)
MCH RBC QN AUTO: 28.1 PG (ref 26.6–33)
MCHC RBC AUTO-ENTMCNC: 32 G/DL (ref 31.5–35.7)
MCV RBC AUTO: 87.6 FL (ref 79–97)
MONOCYTES # BLD AUTO: 0.77 10*3/MM3 (ref 0.1–0.9)
MONOCYTES NFR BLD AUTO: 6.1 % (ref 5–12)
NEUTROPHILS NFR BLD AUTO: 65.1 % (ref 42.7–76)
NEUTROPHILS NFR BLD AUTO: 8.22 10*3/MM3 (ref 1.7–7)
NRBC BLD AUTO-RTO: 0 /100 WBC (ref 0–0.2)
NT-PROBNP SERPL-MCNC: <5 PG/ML (ref 0–450)
PLATELET # BLD AUTO: 274 10*3/MM3 (ref 140–450)
PMV BLD AUTO: 10.9 FL (ref 6–12)
POTASSIUM SERPL-SCNC: 4.3 MMOL/L (ref 3.5–5.2)
PROT SERPL-MCNC: 8.2 G/DL (ref 6–8.5)
RBC # BLD AUTO: 5.24 10*6/MM3 (ref 3.77–5.28)
SODIUM SERPL-SCNC: 140 MMOL/L (ref 136–145)
TROPONIN T SERPL-MCNC: <0.01 NG/ML (ref 0–0.03)
WBC # BLD AUTO: 12.62 10*3/MM3 (ref 3.4–10.8)

## 2021-01-15 PROCEDURE — 83690 ASSAY OF LIPASE: CPT | Performed by: PHYSICIAN ASSISTANT

## 2021-01-15 PROCEDURE — 81025 URINE PREGNANCY TEST: CPT | Performed by: PHYSICIAN ASSISTANT

## 2021-01-15 PROCEDURE — 96374 THER/PROPH/DIAG INJ IV PUSH: CPT

## 2021-01-15 PROCEDURE — 84484 ASSAY OF TROPONIN QUANT: CPT | Performed by: PHYSICIAN ASSISTANT

## 2021-01-15 PROCEDURE — 83880 ASSAY OF NATRIURETIC PEPTIDE: CPT | Performed by: PHYSICIAN ASSISTANT

## 2021-01-15 PROCEDURE — 25010000002 KETOROLAC TROMETHAMINE PER 15 MG: Performed by: PHYSICIAN ASSISTANT

## 2021-01-15 PROCEDURE — 85379 FIBRIN DEGRADATION QUANT: CPT | Performed by: PHYSICIAN ASSISTANT

## 2021-01-15 PROCEDURE — 85025 COMPLETE CBC W/AUTO DIFF WBC: CPT | Performed by: PHYSICIAN ASSISTANT

## 2021-01-15 PROCEDURE — 71045 X-RAY EXAM CHEST 1 VIEW: CPT

## 2021-01-15 PROCEDURE — 99283 EMERGENCY DEPT VISIT LOW MDM: CPT

## 2021-01-15 PROCEDURE — 80053 COMPREHEN METABOLIC PANEL: CPT | Performed by: PHYSICIAN ASSISTANT

## 2021-01-15 PROCEDURE — 93005 ELECTROCARDIOGRAM TRACING: CPT | Performed by: EMERGENCY MEDICINE

## 2021-01-15 RX ORDER — KETOROLAC TROMETHAMINE 30 MG/ML
30 INJECTION, SOLUTION INTRAMUSCULAR; INTRAVENOUS EVERY 6 HOURS PRN
Status: DISCONTINUED | OUTPATIENT
Start: 2021-01-15 | End: 2021-01-15 | Stop reason: HOSPADM

## 2021-01-15 RX ORDER — IBUPROFEN 600 MG/1
600 TABLET ORAL EVERY 8 HOURS PRN
Qty: 9 TABLET | Refills: 0 | Status: SHIPPED | OUTPATIENT
Start: 2021-01-15 | End: 2021-01-18

## 2021-01-15 RX ORDER — PREDNISONE 20 MG/1
20 TABLET ORAL 2 TIMES DAILY
Qty: 10 TABLET | Refills: 0 | Status: SHIPPED | OUTPATIENT
Start: 2021-01-15 | End: 2021-01-20

## 2021-01-15 RX ORDER — SODIUM CHLORIDE 0.9 % (FLUSH) 0.9 %
10 SYRINGE (ML) INJECTION AS NEEDED
Status: DISCONTINUED | OUTPATIENT
Start: 2021-01-15 | End: 2021-01-15 | Stop reason: HOSPADM

## 2021-01-15 RX ORDER — ALBUTEROL SULFATE 90 UG/1
2 AEROSOL, METERED RESPIRATORY (INHALATION) EVERY 4 HOURS PRN
Qty: 6.7 G | Refills: 0 | Status: SHIPPED | OUTPATIENT
Start: 2021-01-15 | End: 2021-09-28

## 2021-01-15 RX ADMIN — KETOROLAC TROMETHAMINE 30 MG: 30 INJECTION, SOLUTION INTRAMUSCULAR at 14:19

## 2021-01-15 RX ADMIN — SODIUM CHLORIDE 1000 ML: 9 INJECTION, SOLUTION INTRAVENOUS at 13:25

## 2021-01-15 NOTE — DISCHARGE INSTRUCTIONS
You could have some inflammation in your chest wall or an early bronchitis causing your symptoms.  Take ibuprofen as directed to help with inflammation as well as the steroids.  Make sure you take ibuprofen with food to help reduce the chances of irritation of the stomach lining.  Use albuterol 2 puffs every 4 hours as needed for shortness of breath or wheezing.  You will need to follow-up with your primary care provider in the next few days to reevaluate your symptoms.  Return here to the ER for any change, worsening symptoms, or any additional concerns include not limited to severe or worsening shortness of breath, dizziness or syncope, hemoptysis, chest pain.

## 2021-01-15 NOTE — ED PROVIDER NOTES
Subjective   Patient is a 25-year-old female with history of abnormal uterine bleeding, anemia, GERD, morbid obesity and recurrent major depressive disorder presenting to the ER for evaluation of shortness of breath and painful breathing. Patient states that upon awakening this morning she felt as if she had had the breath knocked out of her. States she has had some pain in her mid chest radiating towards her back that is worse with breathing. She states she felt this once before she had pneumonia. She denies any known sick contacts or Covid contacts. She denies any fever, chills, cough, hemoptysis, dizziness, syncope, abdominal pain, emesis, diarrhea, leg pain or swelling, or any other symptoms. She states she does not take any hormones, denies any history of DVT or PE. She states she does smoke tobacco, approximately half a pack per day.          Review of Systems   Constitutional: Negative for chills and fever.   HENT: Negative.    Eyes: Negative.    Respiratory: Positive for shortness of breath. Negative for cough.    Cardiovascular: Positive for chest pain.   Gastrointestinal: Negative.    Genitourinary: Negative.    Musculoskeletal: Negative.    Skin: Negative.    Allergic/Immunologic: Negative for immunocompromised state.   Neurological: Negative.    Psychiatric/Behavioral: Negative.        Past Medical History:   Diagnosis Date   • Abnormal uterine bleeding (AUB)    • Amenorrhea, secondary    • Anemia    • Anxiety and depression    • Gastroesophageal reflux disease    • Headache    • History of suicidal ideation 2017   • Low back pain    • Morbid obesity with BMI of 50.0-59.9, adult (CMS/[facility])    • Nightmares    • Obesity    • Rectal bleeding    • Recurrent major depressive disorder (CMS/[facility])    • 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       Allergies   Allergen Reactions   • Bee Venom Anaphylaxis   • Sertraline Hcl Other (See Comments)     Nightmares       Past Surgical History:   Procedure Laterality Date   •  "COLONOSCOPY N/A 5/8/2019    Procedure: COLONOSCOPY;  Surgeon: Alex Bartholomew MD;  Location: [facility] ENDOSCOPY;  Service: Gastroenterology   • D&C HYSTEROSCOPY N/A 2/11/2020    Procedure: DILATATION AND CURETTAGE DIAGNOSTIC HYSTEROSCOPY;  Surgeon: Niya Sun MD;  Location: [facility] OR;  Service: Obstetrics/Gynecology;  Laterality: N/A;   • WISDOM TOOTH EXTRACTION         Family History   Problem Relation Age of Onset   • Depression Mother    • Diabetes Mother    • Hyperlipidemia Mother    • Hypertension Mother    • Arthritis Mother    • Mental illness Mother    • Obesity Mother    • Arthritis Father    • COPD Father    • Depression Father    • Heart disease Father    • Hypertension Father    • Hyperlipidemia Father    • Stroke Father    • Obesity Father    • Mental illness Father    • Heart attack Father    • Depression Maternal Aunt    • Diabetes Maternal Aunt    • Hypertension Maternal Aunt    • Mental illness Maternal Aunt    • Obesity Maternal Aunt    • Cancer Paternal Aunt    • Breast cancer Paternal Aunt    • Diabetes Other         Grandmother    • Mental illness Sister    • Mental illness Sister        Social History     Socioeconomic History   • Marital status:      Spouse name: Not on file   • Number of children: Not on file   • Years of education: Not on file   • Highest education level: Not on file   Tobacco Use   • Smoking status: Current Every Day Smoker     Packs/day: 0.50     Years: 0.50     Pack years: 0.25     Types: Cigarettes, Electronic Cigarette   • Smokeless tobacco: Never Used   Substance and Sexual Activity   • Alcohol use: Yes     Comment: occ.    • Drug use: No   • Sexual activity: Yes     Partners: Male     Birth control/protection: None           Objective   Physical Exam  Vitals signs and nursing note reviewed.     /87 (BP Location: Right arm, Patient Position: Sitting)   Pulse 93   Temp 97.9 °F (36.6 °C) (Oral)   Resp 18   Ht 165.1 cm (65\")   Wt (!) 158 kg (347 lb " 9.6 oz)   LMP 01/13/2021   SpO2 97%   BMI 57.84 kg/m²     GEN: No acute distress, sitting upright in stretcher. Awake and alert. Does not appear toxic. Obese  Head: Normocephalic, atraumatic  Eyes: EOM intact  ENT: Mask in place per protocol  Cardiovascular: Mild tachycardia, regular rhythm  Lungs: Clear to auscultation bilaterally without adventitious sounds  Abdomen: Soft, nontender, nondistended, no peritoneal signs or guarding  Extremities: No edema, normal appearance, radial and posterior tibialis pulses are 2+ and equal, no specific calf tenderness  Neuro: GCS 15  Psych: Mood and affect are appropriate    Procedures           ED Course  ED Course as of Elvin 15 1511   Fri Elvin 15, 2021   1334 WBC(!): 12.62 [LA]   1334 Hemoglobin: 14.7 [LA]   1334 Platelets: 274 [LA]   1409 proBNP: <5.0 [LA]   1409 Troponin T: <0.010 [LA]   1409 D-Dimer, Quant: 0.34 [LA]   1409 Lipase: 24 [LA]   1409 Glucose: 99 [LA]   1409 BUN: 10 [LA]   1409 Creatinine: 0.63 [LA]   1409 Sodium: 140 [LA]   1409 Potassium: 4.3 [LA]   1409 Chloride: 103 [LA]   1409 CO2: 25.2 [LA]   1409 Calcium: 9.4 [LA]   1409 Total Protein: 8.2 [LA]   1409 Albumin: 4.00 [LA]   1409 ALT (SGPT): 18 [LA]   1409 AST (SGOT): 25 [LA]   1409 Alkaline Phosphatase: 81 [LA]   1409 Total Bilirubin: 0.3 [LA]   1409 eGFR Non  Am: 117 [LA]   1409 Globulin: 4.2 [LA]   1409 A/G Ratio: 1.0 [LA]   1409 BUN/Creatinine Ratio: 15.9 [LA]   1409 Anion Gap: 11.8 [LA]   1409 HCG, Urine QL: Negative [LA]   1424 Narrative & Impression    PROCEDURE: XR CHEST 1 VW-        HISTORY: SOA     COMPARISON: None.     FINDINGS: The heart is normal in size. The mediastinum is unremarkable.  The lungs are clear. There is no pneumothorax. There are no acute  osseous abnormalities.     IMPRESSION:  No acute cardiopulmonary process.                       Images were reviewed, interpreted, and dictated by Dr. Melanie Yates M.D.  Transcribed by Yeni Meraz PA-C.        [LA]   2357 EKG  interpreted by me reveals sinus rhythm rate of 99.  Nonspecific T wave changes.  No ectopy.    [PF]   1436 Discussed the findings with patient.  She is resting comfortably in the stretcher.  Vital signs are stable.  We will give her albuterol, steroids and discussed anti-inflammatories to use at home.  Discussed follow-up with her primary care provider and strict return precautions.    [LA]      ED Course User Index  [LA] Dahpnie Garza PA-C  [PF] Luis Miguel Dunbar, DO                                           MDM  Number of Diagnoses or Management Options  Chest pain varying with breathing:   Diagnosis management comments: On arrival, patient is stable. She does not appear toxic, no respiratory distress. Differential could include esophagitis, esophageal spasm, bronchitis, pneumonia, costochondritis, ACS, cardiac dysrhythmia, pulmonary embolism, and other concerns. Will do basic labs, lipase, troponin, D-dimer, chest x-ray, EKG.    EKG interpreted by the attending.  Troponin was not elevated, D-dimer was not elevated, labs were stable labs, very mild leukocytosis.  Chest x-ray as read by the radiologist revealed no acute abnormality.  Discussed these findings with the patient.  Her vital signs been stable here.  Believe she can be discharged with very close follow-up, HEART score is 1.  We will give steroids and albuterol inhaler to help with her symptoms, discussed follow-up and strict return precautions.  She verbalized understanding and was in agreement with this plan of care.       Amount and/or Complexity of Data Reviewed  Clinical lab tests: reviewed and ordered  Tests in the radiology section of CPT®: reviewed and ordered  Review and summarize past medical records: yes    Risk of Complications, Morbidity, and/or Mortality  Presenting problems: moderate  Diagnostic procedures: moderate  Management options: low    Patient Progress  Patient progress: stable      Final diagnoses:   Chest pain varying with  breathing            Daphnie Garza PA-C  01/15/21 [id number]

## 2021-04-14 RX ORDER — TOPIRAMATE 50 MG/1
TABLET, FILM COATED ORAL
Qty: 60 TABLET | Refills: 0 | Status: SHIPPED | OUTPATIENT
Start: 2021-04-14 | End: 2021-07-19

## 2021-04-14 NOTE — TELEPHONE ENCOUNTER
Pt is requesting a refill on Topamax    BUN (mg/dL)   Date Value   06/21/2020 11     Creatinine (mg/dL)   Date Value   03/21/2011 0.6     CREATININE (mg/dL)   Date Value   06/21/2020 0.8     AST (U/L)   Date Value   02/26/2017 16     ALT (U/L)   Date Value   02/26/2017 15     BP Readings from Last 3 Encounters:   10/16/20 110/72   09/23/20 102/62   09/06/20 133/79     All Future Testing planned in [facility]:  Lab Frequency Next Occurrence     Health Maintenance   Topic Date Due    Hepatitis C screen  Never done    Varicella vaccine (1 of 2 - 2-dose childhood series) Never done    HPV vaccine (1 - 2-dose series) Never done    HIV screen  Never done    COVID-19 Vaccine (1) Never done    Chlamydia screen  Never done    DTaP/Tdap/Td vaccine (1 - Tdap) Never done    Cervical cancer screen  12/10/2021    Flu vaccine  Completed    Hepatitis A vaccine  Aged Out    Hepatitis B vaccine  Aged Out    Hib vaccine  Aged Out    Meningococcal (ACWY) vaccine  Aged Out    Pneumococcal 0-64 years Vaccine  Aged Out     Next Visit Date:  No future appointments.

## 2021-06-15 ENCOUNTER — OFFICE VISIT (OUTPATIENT)
Dept: PRIMARY CARE CLINIC | Age: 26
End: 2021-06-15
Payer: MEDICAID

## 2021-06-15 VITALS — OXYGEN SATURATION: 98 % | TEMPERATURE: 97.5 F | HEART RATE: 88 BPM

## 2021-06-15 DIAGNOSIS — J02.9 ACUTE PHARYNGITIS, UNSPECIFIED ETIOLOGY: Primary | ICD-10-CM

## 2021-06-15 DIAGNOSIS — J02.9 SORE THROAT: ICD-10-CM

## 2021-06-15 LAB — S PYO AG THROAT QL: NORMAL

## 2021-06-15 PROCEDURE — 1036F TOBACCO NON-USER: CPT | Performed by: NURSE PRACTITIONER

## 2021-06-15 PROCEDURE — 99213 OFFICE O/P EST LOW 20 MIN: CPT | Performed by: NURSE PRACTITIONER

## 2021-06-15 PROCEDURE — G8427 DOCREV CUR MEDS BY ELIG CLIN: HCPCS | Performed by: NURSE PRACTITIONER

## 2021-06-15 PROCEDURE — 87880 STREP A ASSAY W/OPTIC: CPT | Performed by: NURSE PRACTITIONER

## 2021-06-15 PROCEDURE — G8417 CALC BMI ABV UP PARAM F/U: HCPCS | Performed by: NURSE PRACTITIONER

## 2021-06-15 RX ORDER — AMOXICILLIN 500 MG/1
500 CAPSULE ORAL 2 TIMES DAILY
Qty: 20 CAPSULE | Refills: 0 | Status: SHIPPED | OUTPATIENT
Start: 2021-06-15 | End: 2021-06-25

## 2021-06-15 NOTE — LETTER
[facility]  [address] [address]  Phone: [phone]  Fax: [phone]    JAREN Astudillo CNP        Lenore 15, 2021     Patient: Jess Bright   YOB: 1995   Date of Visit: 6/15/2021       To Whom It May Concern:    Seen in clinic for illness. Please excuse from work 6/14-6/15/21. If you have any questions or concerns, please don't hesitate to call.     Sincerely,        JAREN Astudillo - CNP

## 2021-06-15 NOTE — PROGRESS NOTES
SUBJECTIVE:    Patient ID: Laura Barahona is a 23 y. o.female. Chief Complaint   Patient presents with    Pharyngitis    Nasal Congestion    Headache         HPI:    Patient presents to clinic with complaints of sore throat and symptoms started few days ago. Associated symptoms include slight headache, fatigue, congestion. Hx of strep throat. Denies CP, palpitations, SOB, rash, n/v/d, abdominal pain. No relieving or worsening factors reported. Treatment regimens not reported. Patient's medications, allergies, past medical, surgical, social and family histories were reviewed and updated as appropriate in electronic medical record. Outpatient Medications Marked as Taking for the 6/15/21 encounter (Office Visit) with JAREN Anderson CNP   Medication Sig Dispense Refill    topiramate (TOPAMAX) 50 MG tablet Take 1 tablet by mouth twice daily- needs mickey ppt 60 tablet 0    triamcinolone (KENALOG) 0.1 % cream Apply topically 2 times daily 80 g 3    FLUoxetine (PROZAC) 20 MG capsule Take 1 capsule by mouth daily 30 capsule 5        Review of Systems   Constitutional: Positive for fatigue. Negative for chills, diaphoresis and fever. HENT: Positive for congestion, sinus pressure, sore throat and trouble swallowing. Negative for ear pain and facial swelling. Eyes: Negative. Respiratory: Negative. Cardiovascular: Negative. Gastrointestinal: Negative for abdominal distention, abdominal pain, nausea and vomiting. Genitourinary: Negative. Musculoskeletal: Negative for arthralgias, gait problem, joint swelling and myalgias. Skin: Negative. Neurological: Positive for headaches. Negative for dizziness, syncope and facial asymmetry.        Past Medical History:   Diagnosis Date    Anemia     Anxiety     Borderline personality disorder ([facility] [address]     Depression     GERD (gastroesophageal reflux disease)     Hypothyroidism     OCD (obsessive compulsive disorder)     PCOS (polycystic ovarian syndrome)     Trichotillomania      Past Surgical History:   Procedure Laterality Date    WISDOM TOOTH EXTRACTION       Family History   Problem Relation Age of Onset    Diabetes Mother     Depression Mother     High Blood Pressure Father     Depression Sister     Depression Paternal Aunt     Cancer Paternal Aunt         lung    Diabetes Maternal Grandmother     Depression Maternal Grandmother     High Blood Pressure Maternal Grandmother     Diabetes Maternal Grandfather     High Blood Pressure Maternal Grandfather     Stroke Maternal Grandfather     Diabetes Paternal Grandmother     Cancer Paternal Grandfather         lung      Social History     Tobacco Use   Smoking Status Former Smoker    Packs/day: 0.50    Years: 2.00    Pack years: 1.00    Types: Cigarettes    Quit date: 2018    Years since quittin.5   Smokeless Tobacco Never Used       OBJECTIVE:   Wt Readings from Last 3 Encounters:   20 (!) 353 lb (160.1 kg)   20 (!) 326 lb (147.9 kg)   20 (!) 353 lb 9 oz (160.4 kg)     BP Readings from Last 3 Encounters:   10/16/20 110/72   20 102/62   20 133/79       Pulse 88   Temp 97.5 °F (36.4 °C) (Temporal)   SpO2 98% Comment: room air     Physical Exam  Vitals and nursing note reviewed. Constitutional:       Appearance: Normal appearance. HENT:      Head: Normocephalic. Right Ear: External ear normal.      Left Ear: External ear normal.      Nose: Nose normal.      Mouth/Throat:      Mouth: Mucous membranes are moist.      Pharynx: Pharyngeal swelling and posterior oropharyngeal erythema present. No oropharyngeal exudate or uvula swelling. Eyes:      Conjunctiva/sclera: Conjunctivae normal.   Cardiovascular:      Rate and Rhythm: Normal rate and regular rhythm. Pulmonary:      Effort: Pulmonary effort is normal.      Breath sounds: Normal breath sounds. Abdominal:      Tenderness: There is no guarding.    Musculoskeletal: Cervical back: Normal range of motion. Lymphadenopathy:      Cervical: No cervical adenopathy. Neurological:      General: No focal deficit present. Mental Status: She is alert and oriented to person, place, and time. Psychiatric:         Mood and Affect: Mood normal.         Behavior: Behavior normal.         Lab Results   Component Value Date     06/21/2020    K 4.2 06/21/2020     06/21/2020    CO2 23 06/21/2020    GLUCOSE 117 06/21/2020    BUN 11 06/21/2020    CREATININE 0.8 06/21/2020    CREATININE 0.6 03/21/2011    CALCIUM 9.3 06/21/2020    PROT 8.5 02/26/2017    LABALBU 4.4 02/26/2017    BILITOT 0.3 02/26/2017    ALT 15 02/26/2017    AST 16 02/26/2017       Microscopic Examination (no units)   Date Value   06/21/2020 YES         Lab Results   Component Value Date    WBC 13.7 06/21/2020    NEUTROABS 8.2 01/02/2020    HGB 13.9 06/21/2020    HCT 43.9 06/21/2020    MCV 86.1 06/21/2020     06/21/2020       No results found for: TSH      ASSESSMENT/PLAN:     1. Acute pharyngitis, unspecified etiology  Strep negative. Having symptoms. Will treat. Take medications as prescribed. Continue home medications. Discussed symptom management. Return to clinic S&S worsen or no improvement noted. Patient verbalized understanding.     - amoxicillin (AMOXIL) 500 MG capsule; Take 1 capsule by mouth 2 times daily for 10 days  Dispense: 20 capsule; Refill: 0    2.  Sore throat  See 1    - POCT rapid strep A        Orders Placed This Encounter   Medications    amoxicillin (AMOXIL) 500 MG capsule     Sig: Take 1 capsule by mouth 2 times daily for 10 days     Dispense:  20 capsule     Refill:  0

## 2021-07-10 ASSESSMENT — ENCOUNTER SYMPTOMS
ABDOMINAL DISTENTION: 0
SINUS PRESSURE: 1
SORE THROAT: 1
RESPIRATORY NEGATIVE: 1
VOMITING: 0
NAUSEA: 0
TROUBLE SWALLOWING: 1
FACIAL SWELLING: 0
EYES NEGATIVE: 1
ABDOMINAL PAIN: 0

## 2021-07-19 RX ORDER — TOPIRAMATE 50 MG/1
TABLET, FILM COATED ORAL
Qty: 60 TABLET | Refills: 0 | Status: SHIPPED | OUTPATIENT
Start: 2021-07-19 | End: 2021-10-08

## 2021-07-19 RX ORDER — FLUOXETINE HYDROCHLORIDE 20 MG/1
CAPSULE ORAL
Qty: 90 CAPSULE | Refills: 0 | Status: SHIPPED | OUTPATIENT
Start: 2021-07-19 | End: 2022-03-29 | Stop reason: SDUPTHER

## 2021-09-10 ENCOUNTER — HOSPITAL ENCOUNTER (EMERGENCY)
Facility: HOSPITAL | Age: 26
Discharge: HOME | End: 2021-09-10
Attending: EMERGENCY MEDICINE | Admitting: EMERGENCY MEDICINE

## 2021-09-10 VITALS
HEIGHT: 65 IN | RESPIRATION RATE: 16 BRPM | DIASTOLIC BLOOD PRESSURE: 75 MMHG | TEMPERATURE: 98.9 F | OXYGEN SATURATION: 98 % | BODY MASS INDEX: 48.82 KG/M2 | HEART RATE: 88 BPM | WEIGHT: 293 LBS | SYSTOLIC BLOOD PRESSURE: 131 MMHG

## 2021-09-10 DIAGNOSIS — N93.9 ABNORMAL UTERINE BLEEDING: Primary | ICD-10-CM

## 2021-09-10 LAB
B-HCG UR QL: NEGATIVE
BACTERIA UR QL AUTO: ABNORMAL /HPF
BILIRUB UR QL STRIP: NEGATIVE
CLARITY UR: ABNORMAL
COLOR UR: ABNORMAL
GLUCOSE UR STRIP-MCNC: NEGATIVE MG/DL
HCT VFR BLD AUTO: 46.3 % (ref 34–46.6)
HGB BLD-MCNC: 15.1 G/DL (ref 12–15.9)
HGB UR QL STRIP.AUTO: ABNORMAL
HYALINE CASTS UR QL AUTO: ABNORMAL /LPF
KETONES UR QL STRIP: NEGATIVE
LEUKOCYTE ESTERASE UR QL STRIP.AUTO: ABNORMAL
NITRITE UR QL STRIP: NEGATIVE
PH UR STRIP.AUTO: 6.5 [PH] (ref 5–8)
PROT UR QL STRIP: ABNORMAL
RBC # UR: ABNORMAL /HPF
REF LAB TEST METHOD: ABNORMAL
SP GR UR STRIP: 1.02 (ref 1–1.03)
SQUAMOUS #/AREA URNS HPF: ABNORMAL /HPF
UROBILINOGEN UR QL STRIP: ABNORMAL
WBC UR QL AUTO: ABNORMAL /HPF

## 2021-09-10 PROCEDURE — 36415 COLL VENOUS BLD VENIPUNCTURE: CPT

## 2021-09-10 PROCEDURE — 81001 URINALYSIS AUTO W/SCOPE: CPT | Performed by: PHYSICIAN ASSISTANT

## 2021-09-10 PROCEDURE — 85014 HEMATOCRIT: CPT | Performed by: PHYSICIAN ASSISTANT

## 2021-09-10 PROCEDURE — 85018 HEMOGLOBIN: CPT | Performed by: PHYSICIAN ASSISTANT

## 2021-09-10 PROCEDURE — 81025 URINE PREGNANCY TEST: CPT | Performed by: PHYSICIAN ASSISTANT

## 2021-09-10 PROCEDURE — 99283 EMERGENCY DEPT VISIT LOW MDM: CPT

## 2021-09-10 RX ORDER — MEDROXYPROGESTERONE ACETATE 10 MG/1
10 TABLET ORAL DAILY
Qty: 10 TABLET | Refills: 0 | Status: SHIPPED | OUTPATIENT
Start: 2021-09-10 | End: 2021-09-20

## 2021-09-10 NOTE — ED NOTES
At this time, Grover requested to speak to Dr. Sun. Call transferred at this time.     Amanda Sullivan  09/10/21 [id number]

## 2021-09-27 ENCOUNTER — HOSPITAL ENCOUNTER (EMERGENCY)
Facility: HOSPITAL | Age: 26
Discharge: HOME | End: 2021-09-27
Attending: EMERGENCY MEDICINE | Admitting: EMERGENCY MEDICINE

## 2021-09-27 VITALS
TEMPERATURE: 98 F | OXYGEN SATURATION: 97 % | HEART RATE: 101 BPM | BODY MASS INDEX: 48.82 KG/M2 | RESPIRATION RATE: 18 BRPM | WEIGHT: 293 LBS | DIASTOLIC BLOOD PRESSURE: 51 MMHG | HEIGHT: 65 IN | SYSTOLIC BLOOD PRESSURE: 115 MMHG

## 2021-09-27 DIAGNOSIS — N93.9 ABNORMAL UTERINE BLEEDING (AUB): Primary | ICD-10-CM

## 2021-09-27 LAB
ABO GROUP BLD: NORMAL
ABO GROUP BLD: NORMAL
ALBUMIN SERPL-MCNC: 3.8 G/DL (ref 3.5–5.2)
ALBUMIN/GLOB SERPL: 1.1 G/DL
ALP SERPL-CCNC: 66 U/L (ref 39–117)
ALT SERPL W P-5'-P-CCNC: 13 U/L (ref 1–33)
ANION GAP SERPL CALCULATED.3IONS-SCNC: 11.8 MMOL/L (ref 5–15)
AST SERPL-CCNC: 13 U/L (ref 1–32)
B-HCG UR QL: NEGATIVE
BASOPHILS # BLD AUTO: 0.11 10*3/MM3 (ref 0–0.2)
BASOPHILS NFR BLD AUTO: 0.7 % (ref 0–1.5)
BILIRUB SERPL-MCNC: 0.2 MG/DL (ref 0–1.2)
BLD GP AB SCN SERPL QL: NEGATIVE
BUN SERPL-MCNC: 10 MG/DL (ref 6–20)
BUN/CREAT SERPL: 16.4 (ref 7–25)
CALCIUM SPEC-SCNC: 8.9 MG/DL (ref 8.6–10.5)
CHLORIDE SERPL-SCNC: 107 MMOL/L (ref 98–107)
CLUMPED PLATELETS: PRESENT
CO2 SERPL-SCNC: 20.2 MMOL/L (ref 22–29)
CREAT SERPL-MCNC: 0.61 MG/DL (ref 0.57–1)
DEPRECATED RDW RBC AUTO: 42.3 FL (ref 37–54)
EOSINOPHIL # BLD AUTO: 0.44 10*3/MM3 (ref 0–0.4)
EOSINOPHIL NFR BLD AUTO: 2.7 % (ref 0.3–6.2)
ERYTHROCYTE [DISTWIDTH] IN BLOOD BY AUTOMATED COUNT: 12.9 % (ref 12.3–15.4)
GFR SERPL CREATININE-BSD FRML MDRD: 121 ML/MIN/1.73
GLOBULIN UR ELPH-MCNC: 3.5 GM/DL
GLUCOSE SERPL-MCNC: 132 MG/DL (ref 65–99)
HCT VFR BLD AUTO: 43.2 % (ref 34–46.6)
HGB BLD-MCNC: 13.9 G/DL (ref 12–15.9)
IMM GRANULOCYTES # BLD AUTO: 0.09 10*3/MM3 (ref 0–0.05)
IMM GRANULOCYTES NFR BLD AUTO: 0.6 % (ref 0–0.5)
LYMPHOCYTES # BLD AUTO: 4.03 10*3/MM3 (ref 0.7–3.1)
LYMPHOCYTES NFR BLD AUTO: 25.1 % (ref 19.6–45.3)
MCH RBC QN AUTO: 28.7 PG (ref 26.6–33)
MCHC RBC AUTO-ENTMCNC: 32.2 G/DL (ref 31.5–35.7)
MCV RBC AUTO: 89.1 FL (ref 79–97)
MONOCYTES # BLD AUTO: 1.06 10*3/MM3 (ref 0.1–0.9)
MONOCYTES NFR BLD AUTO: 6.6 % (ref 5–12)
NEUTROPHILS NFR BLD AUTO: 10.3 10*3/MM3 (ref 1.7–7)
NEUTROPHILS NFR BLD AUTO: 64.3 % (ref 42.7–76)
NRBC BLD AUTO-RTO: 0 /100 WBC (ref 0–0.2)
PLATELET # BLD AUTO: ABNORMAL 10*3/UL
PMV BLD AUTO: 11.9 FL (ref 6–12)
POTASSIUM SERPL-SCNC: 3.8 MMOL/L (ref 3.5–5.2)
PROT SERPL-MCNC: 7.3 G/DL (ref 6–8.5)
RBC # BLD AUTO: 4.85 10*6/MM3 (ref 3.77–5.28)
RBC MORPH BLD: NORMAL
RH BLD: POSITIVE
RH BLD: POSITIVE
SMALL PLATELETS BLD QL SMEAR: NORMAL
SODIUM SERPL-SCNC: 139 MMOL/L (ref 136–145)
T&S EXPIRATION DATE: NORMAL
WBC # BLD AUTO: 16.03 10*3/MM3 (ref 3.4–10.8)
WBC MORPH BLD: NORMAL

## 2021-09-27 PROCEDURE — 99283 EMERGENCY DEPT VISIT LOW MDM: CPT

## 2021-09-27 PROCEDURE — 86850 RBC ANTIBODY SCREEN: CPT | Performed by: PHYSICIAN ASSISTANT

## 2021-09-27 PROCEDURE — 85025 COMPLETE CBC W/AUTO DIFF WBC: CPT | Performed by: PHYSICIAN ASSISTANT

## 2021-09-27 PROCEDURE — 86901 BLOOD TYPING SEROLOGIC RH(D): CPT | Performed by: PHYSICIAN ASSISTANT

## 2021-09-27 PROCEDURE — 85007 BL SMEAR W/DIFF WBC COUNT: CPT | Performed by: PHYSICIAN ASSISTANT

## 2021-09-27 PROCEDURE — 86901 BLOOD TYPING SEROLOGIC RH(D): CPT

## 2021-09-27 PROCEDURE — 81025 URINE PREGNANCY TEST: CPT | Performed by: PHYSICIAN ASSISTANT

## 2021-09-27 PROCEDURE — 80053 COMPREHEN METABOLIC PANEL: CPT | Performed by: PHYSICIAN ASSISTANT

## 2021-09-27 PROCEDURE — 86900 BLOOD TYPING SEROLOGIC ABO: CPT | Performed by: PHYSICIAN ASSISTANT

## 2021-09-27 PROCEDURE — 86900 BLOOD TYPING SEROLOGIC ABO: CPT

## 2021-09-28 ENCOUNTER — PREP FOR SURGERY (OUTPATIENT)
Dept: OTHER | Facility: HOSPITAL | Age: 26
End: 2021-09-28

## 2021-09-28 ENCOUNTER — OFFICE VISIT (OUTPATIENT)
Dept: OBSTETRICS AND GYNECOLOGY | Facility: CLINIC | Age: 26
End: 2021-09-28

## 2021-09-28 ENCOUNTER — PRE-ADMISSION TESTING (OUTPATIENT)
Dept: PREADMISSION TESTING | Facility: HOSPITAL | Age: 26
End: 2021-09-28

## 2021-09-28 VITALS
BODY MASS INDEX: 48.82 KG/M2 | DIASTOLIC BLOOD PRESSURE: 64 MMHG | SYSTOLIC BLOOD PRESSURE: 118 MMHG | HEIGHT: 65 IN | WEIGHT: 293 LBS

## 2021-09-28 VITALS — BODY MASS INDEX: 48.82 KG/M2 | WEIGHT: 293 LBS | HEIGHT: 65 IN

## 2021-09-28 DIAGNOSIS — N92.1 MENORRHAGIA WITH IRREGULAR CYCLE: ICD-10-CM

## 2021-09-28 DIAGNOSIS — N92.1 MENORRHAGIA WITH IRREGULAR CYCLE: Primary | ICD-10-CM

## 2021-09-28 DIAGNOSIS — E66.01 MORBID OBESITY WITH BMI OF 50.0-59.9, ADULT (HCC): ICD-10-CM

## 2021-09-28 DIAGNOSIS — R93.5 ABNORMAL ULTRASOUND OF ENDOMETRIUM: ICD-10-CM

## 2021-09-28 LAB
BACTERIA UR QL AUTO: ABNORMAL /HPF
BASOPHILS # BLD AUTO: 0.1 10*3/MM3 (ref 0–0.2)
BASOPHILS NFR BLD AUTO: 0.8 % (ref 0–1.5)
BILIRUB UR QL STRIP: NEGATIVE
CLARITY UR: ABNORMAL
COLOR UR: ABNORMAL
DEPRECATED RDW RBC AUTO: 42.7 FL (ref 37–54)
EOSINOPHIL # BLD AUTO: 0.41 10*3/MM3 (ref 0–0.4)
EOSINOPHIL NFR BLD AUTO: 3.1 % (ref 0.3–6.2)
ERYTHROCYTE [DISTWIDTH] IN BLOOD BY AUTOMATED COUNT: 13 % (ref 12.3–15.4)
GLUCOSE UR STRIP-MCNC: NEGATIVE MG/DL
HCT VFR BLD AUTO: 41.6 % (ref 34–46.6)
HGB BLD-MCNC: 13.5 G/DL (ref 12–15.9)
HGB UR QL STRIP.AUTO: ABNORMAL
HYALINE CASTS UR QL AUTO: ABNORMAL /LPF
IMM GRANULOCYTES # BLD AUTO: 0.04 10*3/MM3 (ref 0–0.05)
IMM GRANULOCYTES NFR BLD AUTO: 0.3 % (ref 0–0.5)
KETONES UR QL STRIP: NEGATIVE
LEUKOCYTE ESTERASE UR QL STRIP.AUTO: ABNORMAL
LYMPHOCYTES # BLD AUTO: 4.01 10*3/MM3 (ref 0.7–3.1)
LYMPHOCYTES NFR BLD AUTO: 30.5 % (ref 19.6–45.3)
MCH RBC QN AUTO: 29.3 PG (ref 26.6–33)
MCHC RBC AUTO-ENTMCNC: 32.5 G/DL (ref 31.5–35.7)
MCV RBC AUTO: 90.2 FL (ref 79–97)
MONOCYTES # BLD AUTO: 0.68 10*3/MM3 (ref 0.1–0.9)
MONOCYTES NFR BLD AUTO: 5.2 % (ref 5–12)
NEUTROPHILS NFR BLD AUTO: 60.1 % (ref 42.7–76)
NEUTROPHILS NFR BLD AUTO: 7.89 10*3/MM3 (ref 1.7–7)
NITRITE UR QL STRIP: NEGATIVE
NRBC BLD AUTO-RTO: 0 /100 WBC (ref 0–0.2)
PH UR STRIP.AUTO: 7 [PH] (ref 5–8)
PLATELET # BLD AUTO: 256 10*3/MM3 (ref 140–450)
PMV BLD AUTO: 12.2 FL (ref 6–12)
PROT UR QL STRIP: ABNORMAL
RBC # BLD AUTO: 4.61 10*6/MM3 (ref 3.77–5.28)
RBC # UR: ABNORMAL /HPF
RBC MORPH BLD: NORMAL
REF LAB TEST METHOD: ABNORMAL
SMALL PLATELETS BLD QL SMEAR: ADEQUATE
SP GR UR STRIP: 1.02 (ref 1–1.03)
SQUAMOUS #/AREA URNS HPF: ABNORMAL /HPF
UROBILINOGEN UR QL STRIP: ABNORMAL
WBC # BLD AUTO: 13.13 10*3/MM3 (ref 3.4–10.8)
WBC MORPH BLD: NORMAL
WBC UR QL AUTO: ABNORMAL /HPF

## 2021-09-28 PROCEDURE — 99214 OFFICE O/P EST MOD 30 MIN: CPT | Performed by: OBSTETRICS & GYNECOLOGY

## 2021-09-28 PROCEDURE — 36415 COLL VENOUS BLD VENIPUNCTURE: CPT

## 2021-09-28 PROCEDURE — 85025 COMPLETE CBC W/AUTO DIFF WBC: CPT

## 2021-09-28 PROCEDURE — 81001 URINALYSIS AUTO W/SCOPE: CPT

## 2021-09-28 PROCEDURE — 85007 BL SMEAR W/DIFF WBC COUNT: CPT

## 2021-09-28 PROCEDURE — 87086 URINE CULTURE/COLONY COUNT: CPT

## 2021-09-28 RX ORDER — MEDROXYPROGESTERONE ACETATE 10 MG/1
10 TABLET ORAL DAILY
Qty: 30 TABLET | Refills: 0 | Status: SHIPPED | OUTPATIENT
Start: 2021-09-28 | End: 2021-10-05 | Stop reason: HOSPADM

## 2021-09-28 RX ORDER — SODIUM CHLORIDE 0.9 % (FLUSH) 0.9 %
3 SYRINGE (ML) INJECTION EVERY 12 HOURS SCHEDULED
Status: CANCELLED | OUTPATIENT
Start: 2021-09-28

## 2021-09-28 RX ORDER — SODIUM CHLORIDE 0.9 % (FLUSH) 0.9 %
10 SYRINGE (ML) INJECTION AS NEEDED
Status: CANCELLED | OUTPATIENT
Start: 2021-09-28

## 2021-09-29 LAB — BACTERIA SPEC AEROBE CULT: NORMAL

## 2021-09-29 NOTE — PROGRESS NOTES
Chief Complaint  Vaginal Bleeding (Heavy bleeding)     History of Present Illness:  Patient is 26 y.o.  who presents to [facility] OB [facility]YN for evaluation of menorrhagia.  Patient had a D&C last year.  Her pathology returned with chronic endometritis.  Patient reports following her procedure her menstrual cycles have been regular.  Patient reports they normally were lasting for 4 to 5 days.  Patient reports however in September having the onset of heavy vaginal bleeding.  Patient had a normal menstrual cycle starting on .  Patient reports it stopped and then several days later she had the onset of heavy bleeding which has continued.  The patient was seen in the emergency room on September 10.  She was given Provera.  Patient does report the bleeding stopped with the Provera.  Patient had only 10 days of medication.  Patient reports once she stopped the medication her bleeding recurred.  Patient represented to the emergency room last evening.  Patient did have repeat labs as noted.  Patient has continued to have heavy bleeding reporting she is changing a pad every 30 minutes.    History  Past Medical History:   Diagnosis Date   • Abnormal uterine bleeding (AUB)    • Amenorrhea, secondary    • Anemia    • Anxiety and depression    • Body piercing 2021    ears, nostril x2   • Gastroesophageal reflux disease    • Headache    • History of bronchitis 2021   • History of suicidal ideation 2017   • Low back pain    • Morbid obesity with BMI of 50.0-59.9, adult (CMS/[facility])    • Nightmares    • Obesity    • Rectal bleeding     Reported as history   • Recurrent major depressive disorder (CMS/[facility])    • Seasonal allergies 2021   • Tattoos 2021   • 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     • triamcinolone (KENALOG) 0.1 % cream Apply  topically 2 (Two) Times a Day. (Patient taking differently: Apply 1 application topically to the appropriate area as directed 2 (Two) Times a Day.) 45 g 6     No current facility-administered medications on file prior to visit.     Allergies   Allergen Reactions   • Bee Venom Anaphylaxis   • Sertraline Hcl Other (See Comments)     Nightmares     Past Surgical History:   Procedure Laterality Date   • COLONOSCOPY N/A 5/8/2019    Procedure: COLONOSCOPY;  Surgeon: Alex Bartholomew MD;  Location: [facility] ENDOSCOPY;  Service: Gastroenterology   • D & C HYSTEROSCOPY N/A 2/11/2020    Procedure: DILATATION AND CURETTAGE DIAGNOSTIC HYSTEROSCOPY;  Surgeon: Niya Sun MD;  Location: [facility] OR;  Service: Obstetrics/Gynecology;  Laterality: N/A;   • WISDOM TOOTH EXTRACTION       Family History   Problem Relation Age of Onset   • Depression Mother    • Diabetes Mother    • Hyperlipidemia Mother    • Hypertension Mother    • Arthritis Mother    • Mental illness Mother    • Obesity Mother    • Arthritis Father    • COPD Father    • Depression Father    • Heart disease Father    • Hypertension Father    • Hyperlipidemia Father    • Stroke Father    • Obesity Father    • Mental illness Father    • Heart attack Father    • Depression Maternal Aunt    • Diabetes Maternal Aunt    • Hypertension Maternal Aunt    • Mental illness Maternal Aunt    • Obesity Maternal Aunt    • Cancer Paternal Aunt    • Breast cancer Paternal Aunt    • Diabetes Other         Grandmother    • Mental illness Sister    • Mental illness Sister      Social History     Socioeconomic History   • Marital status:      Spouse name: Not on file   • Number of children: Not on file   • Years  "of education: Not on file   • Highest education level: Not on file   Tobacco Use   • Smoking status: Current Every Day Smoker     Packs/day: 1.00     Years: 4.00     Pack years: 4.00     Types: Cigarettes   • Smokeless tobacco: Never Used   Vaping Use   • Vaping Use: Former   • Quit date: 3/28/2021   • Substances: Nicotine   Substance and Sexual Activity   • Alcohol use: Yes     Comment: occ.    • Drug use: Yes     Frequency: 1.0 times per week     Types: Marijuana     Comment: Reported marijuana use for the past 2-3 years   • Sexual activity: Yes     Partners: Male     Birth control/protection: None       Physical Examination:  Vital Signs: /64   Ht 165.1 cm (65\")   Wt (!) 158 kg (348 lb)   BMI 57.91 kg/m²     General Appearance: alert, appears stated age, and cooperative  Breasts: Not performed.  Abdomen: no masses, no hepatomegaly, no splenomegaly, soft non-tender, no guarding and no rebound tenderness  Pelvic: Not performed.    Data Review:  The following data was reviewed by: Niya Sun MD on 09/28/2021:     Labs:  Urinalysis With Microscopic If Indicated (No Culture) - Urine, Clean Catch (09/10/2021 12:20)  Pregnancy, Urine - Urine, Clean Catch (09/10/2021 12:20)  Urinalysis, Microscopic Only - Urine, Clean Catch (09/10/2021 12:20)  Hemoglobin & Hematocrit, Blood (09/10/2021 12:22)  Comprehensive Metabolic Panel (09/27/2021 18:10)  Type & Screen (09/27/2021 18:10)  ABO RH Specimen Verification (09/27/2021 18:14)  CBC & Differential (09/27/2021 19:26)  Pregnancy, Urine - Urine, Clean Catch (09/27/2021 19:54)    Imaging:  US Non-ob Transvaginal (09/28/2021 11:59)    Medical Records:  ED Provider Notes by Wing Mejía PA-C (09/10/2021 12:58)  ED Provider Notes by Shelly Moore PA (09/27/2021 17:49)      Assessment and Plan   Problem List Items Addressed This Visit        Genitourinary and Reproductive     Abnormal ultrasound of endometrium  Patient with markedly thickened endometrium " on transvaginal ultrasound.  Prescription is given for Provera as noted.  We will proceed with D&C for further evaluation.  Patient did have chronic endometritis as well from her previous pathology.  Plan pending results.    Overview     Added automatically from request for surgery [id number]         Relevant Medications    medroxyPROGESTERone (Provera) 10 MG tablet    Menorrhagia with irregular cycle - Primary  Patient with menorrhagia as noted.  Transvaginal ultrasound is obtained today.  The endometrium was noted to be markedly thickened.  Prescriptions given for Provera.  Patient is to stay on the Provera until her D&C as discussed.  I discussed with the patient the various options for management of her symptoms.  Given her heavy bleeding as well as markedly thickened endometrium patient desires to proceed with D&C for further evaluation and management of her symptoms.  Patient understands she will then need to be on cyclic Provera.  Instructions and precautions have been given.  I discussed with the patient the risks, complications, benefits, as well as other alternatives.    Overview     Added automatically from request for surgery [id number]         Relevant Medications    medroxyPROGESTERone (Provera) 10 MG tablet      Other Visit Diagnoses     Morbid obesity with BMI of 50.0-59.9, adult (HCC)      Patient with morbid obesity placing her at risk for uterine hyperplasia.  Patient did have benign pathology on her previous D&C.  She did have chronic endometritis for which it is uncertain if she completed her antibiotics.    Relevant Medications    medroxyPROGESTERone (Provera) 10 MG tablet          Follow Up/Instructions:  Follow up as noted.  Patient was given instructions and counseling regarding her condition or for health maintenance advice. Please see specific information pulled into the AVS if appropriate.     Note: Speech recognition transcription software may have been used to dictate portions of this  document.  An attempt at proofreading has been made though minor errors in transcription may still be present.    This note was electronically signed.  Niya Sun M.D.

## 2021-10-05 ENCOUNTER — HOSPITAL ENCOUNTER (OUTPATIENT)
Facility: HOSPITAL | Age: 26
Setting detail: HOSPITAL OUTPATIENT SURGERY
Discharge: HOME | End: 2021-10-05
Attending: OBSTETRICS & GYNECOLOGY | Admitting: OBSTETRICS & GYNECOLOGY

## 2021-10-05 ENCOUNTER — ANESTHESIA (OUTPATIENT)
Dept: PERIOP | Facility: HOSPITAL | Age: 26
End: 2021-10-05

## 2021-10-05 ENCOUNTER — ANESTHESIA EVENT (OUTPATIENT)
Dept: PERIOP | Facility: HOSPITAL | Age: 26
End: 2021-10-05

## 2021-10-05 VITALS
HEART RATE: 72 BPM | DIASTOLIC BLOOD PRESSURE: 64 MMHG | OXYGEN SATURATION: 99 % | SYSTOLIC BLOOD PRESSURE: 111 MMHG | RESPIRATION RATE: 18 BRPM | TEMPERATURE: 98.2 F

## 2021-10-05 DIAGNOSIS — R93.5 ABNORMAL ULTRASOUND OF ENDOMETRIUM: Primary | ICD-10-CM

## 2021-10-05 DIAGNOSIS — N92.1 MENORRHAGIA WITH IRREGULAR CYCLE: ICD-10-CM

## 2021-10-05 LAB
B-HCG UR QL: NEGATIVE
INTERNAL NEGATIVE CONTROL: NORMAL
INTERNAL POSITIVE CONTROL: NORMAL
Lab: NORMAL

## 2021-10-05 PROCEDURE — 81025 URINE PREGNANCY TEST: CPT | Performed by: OBSTETRICS & GYNECOLOGY

## 2021-10-05 PROCEDURE — 58558 HYSTEROSCOPY BIOPSY: CPT | Performed by: OBSTETRICS & GYNECOLOGY

## 2021-10-05 PROCEDURE — 25010000002 DEXAMETHASONE PER 1 MG: Performed by: NURSE ANESTHETIST, CERTIFIED REGISTERED

## 2021-10-05 PROCEDURE — 25010000002 PROPOFOL 10 MG/ML EMULSION: Performed by: NURSE ANESTHETIST, CERTIFIED REGISTERED

## 2021-10-05 PROCEDURE — 25010000002 MIDAZOLAM PER 1MG: Performed by: NURSE ANESTHETIST, CERTIFIED REGISTERED

## 2021-10-05 PROCEDURE — 25010000002 KETOROLAC TROMETHAMINE PER 15 MG: Performed by: NURSE ANESTHETIST, CERTIFIED REGISTERED

## 2021-10-05 PROCEDURE — 25010000002 ONDANSETRON PER 1 MG: Performed by: NURSE ANESTHETIST, CERTIFIED REGISTERED

## 2021-10-05 PROCEDURE — 25010000003 LIDOCAINE 1 % SOLUTION: Performed by: OBSTETRICS & GYNECOLOGY

## 2021-10-05 PROCEDURE — S0260 H&P FOR SURGERY: HCPCS | Performed by: OBSTETRICS & GYNECOLOGY

## 2021-10-05 PROCEDURE — 25010000002 FENTANYL CITRATE (PF) 50 MCG/ML SOLUTION: Performed by: NURSE ANESTHETIST, CERTIFIED REGISTERED

## 2021-10-05 RX ORDER — LIDOCAINE HYDROCHLORIDE 10 MG/ML
INJECTION, SOLUTION INFILTRATION; PERINEURAL AS NEEDED
Status: DISCONTINUED | OUTPATIENT
Start: 2021-10-05 | End: 2021-10-05 | Stop reason: HOSPADM

## 2021-10-05 RX ORDER — ONDANSETRON 2 MG/ML
4 INJECTION INTRAMUSCULAR; INTRAVENOUS ONCE AS NEEDED
Status: DISCONTINUED | OUTPATIENT
Start: 2021-10-05 | End: 2021-10-05 | Stop reason: HOSPADM

## 2021-10-05 RX ORDER — MAGNESIUM HYDROXIDE 1200 MG/15ML
LIQUID ORAL AS NEEDED
Status: DISCONTINUED | OUTPATIENT
Start: 2021-10-05 | End: 2021-10-05 | Stop reason: HOSPADM

## 2021-10-05 RX ORDER — MIDAZOLAM HYDROCHLORIDE 2 MG/2ML
INJECTION, SOLUTION INTRAMUSCULAR; INTRAVENOUS AS NEEDED
Status: DISCONTINUED | OUTPATIENT
Start: 2021-10-05 | End: 2021-10-05 | Stop reason: SURG

## 2021-10-05 RX ORDER — SODIUM CHLORIDE, SODIUM LACTATE, POTASSIUM CHLORIDE, CALCIUM CHLORIDE 600; 310; 30; 20 MG/100ML; MG/100ML; MG/100ML; MG/100ML
1000 INJECTION, SOLUTION INTRAVENOUS CONTINUOUS
Status: DISCONTINUED | OUTPATIENT
Start: 2021-10-05 | End: 2021-10-05 | Stop reason: HOSPADM

## 2021-10-05 RX ORDER — PROMETHAZINE HYDROCHLORIDE 25 MG/1
25 SUPPOSITORY RECTAL ONCE AS NEEDED
Status: DISCONTINUED | OUTPATIENT
Start: 2021-10-05 | End: 2021-10-05 | Stop reason: HOSPADM

## 2021-10-05 RX ORDER — SODIUM CHLORIDE 0.9 % (FLUSH) 0.9 %
3 SYRINGE (ML) INJECTION EVERY 12 HOURS SCHEDULED
Status: DISCONTINUED | OUTPATIENT
Start: 2021-10-05 | End: 2021-10-05 | Stop reason: HOSPADM

## 2021-10-05 RX ORDER — IBUPROFEN 800 MG/1
800 TABLET ORAL EVERY 8 HOURS PRN
Qty: 30 TABLET | Refills: 0 | Status: SHIPPED | OUTPATIENT
Start: 2021-10-05 | End: 2022-07-15 | Stop reason: HOSPADM

## 2021-10-05 RX ORDER — SODIUM CHLORIDE 0.9 % (FLUSH) 0.9 %
10 SYRINGE (ML) INJECTION AS NEEDED
Status: DISCONTINUED | OUTPATIENT
Start: 2021-10-05 | End: 2021-10-05 | Stop reason: HOSPADM

## 2021-10-05 RX ORDER — PROMETHAZINE HYDROCHLORIDE 25 MG/1
12.5 TABLET ORAL ONCE AS NEEDED
Status: DISCONTINUED | OUTPATIENT
Start: 2021-10-05 | End: 2021-10-05 | Stop reason: HOSPADM

## 2021-10-05 RX ORDER — LIDOCAINE HYDROCHLORIDE 20 MG/ML
INJECTION, SOLUTION INTRAVENOUS AS NEEDED
Status: DISCONTINUED | OUTPATIENT
Start: 2021-10-05 | End: 2021-10-05 | Stop reason: SURG

## 2021-10-05 RX ORDER — LORAZEPAM 2 MG/ML
0.25 INJECTION INTRAMUSCULAR
Status: DISCONTINUED | OUTPATIENT
Start: 2021-10-05 | End: 2021-10-05 | Stop reason: HOSPADM

## 2021-10-05 RX ORDER — MEPERIDINE HYDROCHLORIDE 25 MG/ML
12.5 INJECTION INTRAMUSCULAR; INTRAVENOUS; SUBCUTANEOUS
Status: DISCONTINUED | OUTPATIENT
Start: 2021-10-05 | End: 2021-10-05 | Stop reason: HOSPADM

## 2021-10-05 RX ORDER — PROPOFOL 10 MG/ML
VIAL (ML) INTRAVENOUS AS NEEDED
Status: DISCONTINUED | OUTPATIENT
Start: 2021-10-05 | End: 2021-10-05 | Stop reason: SURG

## 2021-10-05 RX ORDER — FENTANYL CITRATE 50 UG/ML
INJECTION, SOLUTION INTRAMUSCULAR; INTRAVENOUS AS NEEDED
Status: DISCONTINUED | OUTPATIENT
Start: 2021-10-05 | End: 2021-10-05 | Stop reason: SURG

## 2021-10-05 RX ORDER — ONDANSETRON 2 MG/ML
INJECTION INTRAMUSCULAR; INTRAVENOUS AS NEEDED
Status: DISCONTINUED | OUTPATIENT
Start: 2021-10-05 | End: 2021-10-05 | Stop reason: SURG

## 2021-10-05 RX ORDER — DEXAMETHASONE SODIUM PHOSPHATE 4 MG/ML
INJECTION, SOLUTION INTRA-ARTICULAR; INTRALESIONAL; INTRAMUSCULAR; INTRAVENOUS; SOFT TISSUE AS NEEDED
Status: DISCONTINUED | OUTPATIENT
Start: 2021-10-05 | End: 2021-10-05 | Stop reason: SURG

## 2021-10-05 RX ORDER — ONDANSETRON HYDROCHLORIDE 8 MG/1
8 TABLET, FILM COATED ORAL EVERY 8 HOURS PRN
Qty: 10 TABLET | Refills: 0 | Status: SHIPPED | OUTPATIENT
Start: 2021-10-05 | End: 2021-10-15

## 2021-10-05 RX ORDER — HYDROCODONE BITARTRATE AND ACETAMINOPHEN 5; 325 MG/1; MG/1
1 TABLET ORAL EVERY 8 HOURS PRN
Qty: 10 TABLET | Refills: 0 | Status: SHIPPED | OUTPATIENT
Start: 2021-10-05 | End: 2021-10-15

## 2021-10-05 RX ORDER — PROMETHAZINE HYDROCHLORIDE 25 MG/1
25 TABLET ORAL ONCE AS NEEDED
Status: DISCONTINUED | OUTPATIENT
Start: 2021-10-05 | End: 2021-10-05 | Stop reason: HOSPADM

## 2021-10-05 RX ORDER — IPRATROPIUM BROMIDE AND ALBUTEROL SULFATE 2.5; .5 MG/3ML; MG/3ML
3 SOLUTION RESPIRATORY (INHALATION) ONCE AS NEEDED
Status: DISCONTINUED | OUTPATIENT
Start: 2021-10-05 | End: 2021-10-05 | Stop reason: HOSPADM

## 2021-10-05 RX ORDER — ONDANSETRON 4 MG/1
4 TABLET, FILM COATED ORAL ONCE AS NEEDED
Status: DISCONTINUED | OUTPATIENT
Start: 2021-10-05 | End: 2021-10-05 | Stop reason: HOSPADM

## 2021-10-05 RX ORDER — KETOROLAC TROMETHAMINE 30 MG/ML
INJECTION, SOLUTION INTRAMUSCULAR; INTRAVENOUS AS NEEDED
Status: DISCONTINUED | OUTPATIENT
Start: 2021-10-05 | End: 2021-10-05 | Stop reason: SURG

## 2021-10-05 RX ORDER — HYDROCODONE BITARTRATE AND ACETAMINOPHEN 7.5; 325 MG/1; MG/1
1 TABLET ORAL ONCE AS NEEDED
Status: DISCONTINUED | OUTPATIENT
Start: 2021-10-05 | End: 2021-10-05 | Stop reason: HOSPADM

## 2021-10-05 RX ORDER — KETAMINE HYDROCHLORIDE 50 MG/ML
INJECTION, SOLUTION, CONCENTRATE INTRAMUSCULAR; INTRAVENOUS AS NEEDED
Status: DISCONTINUED | OUTPATIENT
Start: 2021-10-05 | End: 2021-10-05 | Stop reason: SURG

## 2021-10-05 RX ADMIN — ONDANSETRON 4 MG: 2 INJECTION INTRAMUSCULAR; INTRAVENOUS at 08:47

## 2021-10-05 RX ADMIN — KETAMINE HYDROCHLORIDE 25 MG: 50 INJECTION, SOLUTION INTRAMUSCULAR; INTRAVENOUS at 08:47

## 2021-10-05 RX ADMIN — FENTANYL CITRATE 50 MCG: 50 INJECTION INTRAMUSCULAR; INTRAVENOUS at 08:47

## 2021-10-05 RX ADMIN — PROPOFOL 70 MG: 10 INJECTION, EMULSION INTRAVENOUS at 08:47

## 2021-10-05 RX ADMIN — MIDAZOLAM HYDROCHLORIDE 2 MG: 1 INJECTION, SOLUTION INTRAMUSCULAR; INTRAVENOUS at 08:44

## 2021-10-05 RX ADMIN — DEXAMETHASONE SODIUM PHOSPHATE 4 MG: 4 INJECTION, SOLUTION INTRAMUSCULAR; INTRAVENOUS at 08:47

## 2021-10-05 RX ADMIN — SODIUM CHLORIDE, POTASSIUM CHLORIDE, SODIUM LACTATE AND CALCIUM CHLORIDE 1000 ML: 600; 310; 30; 20 INJECTION, SOLUTION INTRAVENOUS at 07:45

## 2021-10-05 RX ADMIN — PROPOFOL 125 MCG/KG/MIN: 10 INJECTION, EMULSION INTRAVENOUS at 08:47

## 2021-10-05 RX ADMIN — LIDOCAINE HYDROCHLORIDE 60 MG: 20 INJECTION, SOLUTION INTRAVENOUS at 08:47

## 2021-10-05 RX ADMIN — KETOROLAC TROMETHAMINE 30 MG: 30 INJECTION, SOLUTION INTRAMUSCULAR at 09:06

## 2021-10-05 NOTE — ANESTHESIA PREPROCEDURE EVALUATION
Anesthesia Evaluation     Patient summary reviewed and Nursing notes reviewed   no history of anesthetic complications:  NPO Solid Status: > 8 hours  NPO Liquid Status: > 8 hours           Airway   Mallampati: III  TM distance: >3 FB  Neck ROM: full  Possible difficult intubation, Large neck circumference and Difficult intubation highly probable  Dental - normal exam     Pulmonary - normal exam   (+) a smoker Current Smoked day of surgery,   Cardiovascular - negative cardio ROS and normal exam  Exercise tolerance: good (4-7 METS)        Neuro/Psych  (+) headaches, numbness, psychiatric history Anxiety and Depression,     GI/Hepatic/Renal/Endo    (+) obesity, morbid obesity, GERD, GI bleeding ,     Musculoskeletal     (+) back pain,   Abdominal   (+) obese,    Substance History   (+) alcohol use, drug use      Comment: marijuana   OB/GYN negative ob/gyn ROS         Other                          Anesthesia Plan    ASA 3     MAC   (Patient advised that intravenous sedation would be used as the primary anesthetic, along with local anesthesia if necessary. Every effort will be made to make sure the patient is comfortable.     The patient was told that they may experience recall of the procedure. The patient was further advised that if the MAC anesthetic was deemed ineffective that general anesthesia administered via LMA or ETT may be required.    Patient verbalized understanding and agreed to plan of care. )  intravenous induction     Anesthetic plan, all risks, benefits, and alternatives have been provided, discussed and informed consent has been obtained with: patient.    Plan discussed with CRNA.

## 2021-10-05 NOTE — OP NOTE
Hai Clemente  : 1995  MRN: [id number]  CSN: [id number]  Date: 10/5/2021    Operative Report    Pre-op Diagnosis:  Menorrhagia with irregular cycle [N92.1]   Abnormal endometrium on ultrasound   Post-op Diagnosis:  Post-Op Diagnosis Codes:     * Menorrhagia with irregular cycle [N92.1]       Same   Procedure: Procedure(s):  DILATATION AND CURETTAGE, DIAGNOSTIC HYSTEROSCOPY   Surgeon: Niya Sun M.D.   Assist: ELISE Gunter was responsible for performing the following activities: Retraction and their skilled assistance was necessary for the success of this case.   Anesthesia: IV sedation with 1% lidocaine paracervical block   Estimated Blood Loss: 5 mls   ABx: none   Specimens:  endometrial   Findings: Moderate amount of tissue retrieved.  Endometrium grossly normal with bilateral ostia identified.   Complications: none   Indications:  See H&P         Description of Procedure:  After the appropriate time out and adequate dosing of her anesthesia, the patient had been prepped and draped in the usual sterile fashion.  She was placed in the dorsal lithotomy position using Dwight stirrups.  The bladder had been drained with a red rubber catheter per nursing.  A weighted speculum was placed in the vagina.  The anterior lip of the cervix was grasped with a single-tooth tenaculum.  The cervix was injected at the 3 o'clock and 9 o'clock position with 1% lidocaine plain without any complications.  The cervix was then progressively dilated using Roa dilators.  Rigid hysteroscopy was then performed with the above findings noted.  Sharp curettings were then obtained with a good cry throughout with tissue retrieved and sent for pathologic specimen.  The cervical tenaculum was removed and the cervix was noted to be hemostatic.  All instrument and sponge counts were correct at the end of the procedure.  The patient tolerated the procedure well.  There were no complications.  She was taken to  the postoperative recovery room in stable condition.    Niya Sun M.D.  10/5/2021  09:07 EDT

## 2021-10-05 NOTE — ANESTHESIA POSTPROCEDURE EVALUATION
Patient: Gracie Clemente    Procedure Summary     Date: 10/05/21 Room / Location: [facility] OR 2 /  [facility]    Anesthesia Start: 0844 Anesthesia Stop: 0910    Procedure: DILATATION AND CURETTAGE, DIAGNOSTIC HYSTEROSCOPY (N/A Uterus) Diagnosis:       Menorrhagia with irregular cycle      (Menorrhagia with irregular cycle [N92.1])    Surgeons: Niya Sun MD Provider: Chucho Lopez CRNA    Anesthesia Type: MAC ASA Status: 3          Anesthesia Type: MAC    Vitals  Vitals Value Taken Time   /64 10/05/21 0948   Temp 98.2 °F (36.8 °C) 10/05/21 0920   Pulse 72 10/05/21 0948   Resp 18 10/05/21 0948   SpO2 99 % 10/05/21 0948           Post Anesthesia Care and Evaluation    Patient location during evaluation: PHASE II  Patient participation: complete - patient participated  Level of consciousness: awake and sleepy but conscious  Pain management: adequate  Airway patency: patent  Anesthetic complications: No anesthetic complications  PONV Status: none  Cardiovascular status: acceptable and hemodynamically stable  Respiratory status: acceptable, nonlabored ventilation, spontaneous ventilation and face mask  Hydration status: acceptable

## 2021-10-05 NOTE — DISCHARGE INSTRUCTIONS
To assist you in voiding:  Drink plenty of fluids  Listen to running water while attempting to void.    If you are unable to urinate and you have an uncomfortable urge to void or it has been   6 hours since you were discharged, return to the Emergency Room    No pushing, pulling, tugging,  heavy lifting, or strenuous activity.  No major decision making, driving, or drinking alcoholic beverages for 24 hours. ( due to the medications you have  received)  Always use good hand hygiene/washing techniques.  NO driving while taking pain medications.    Pelvic rest is best described as not putting anything in your vagina. This includes tampons, douching, tub bathing or sexual activity.

## 2021-10-05 NOTE — H&P
LILLIANA Valles  Gracie Clemente  : 1995  MRN: [id number]  [facility]: [id number]    History and Physical    Subjective   Gracie Clemente is a 26 y.o. year old  who present for surgery due to history of menorrhagia.  The patient has a known history of menorrhagia in the past.  The patient had a D&C last year.  Her pathology returned with chronic endometritis.  The patient had normal cycles for several months.  The patient had recent onset of heavy menstrual bleeding.  The patient has been bleeding for several weeks.  She was seen in the emergency room on September 10.  Patient was given Provera.  The bleeding stopped for a few days.  Patient then started bleeding heavily again.  Patient had a transvaginal ultrasound in the office.  The endometrium is heterogeneous and thickened measuring 16.93 mm.  The patient is here for further evaluation with D&C and diagnostic hysteroscopy.    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     Current Outpatient Medications on File Prior to Encounter   Medication Sig Dispense Refill   • FLUoxetine (PROzac) 20 MG capsule TAKE ONE CAPSULE BY MOUTH EVERY DAY (Patient taking differently: Take 20 mg by  mouth Daily.) 30 capsule 5   • medroxyPROGESTERone (Provera) 10 MG tablet Take 1 tablet by mouth Daily. 30 tablet 0   • topiramate (TOPAMAX) 50 MG tablet Take 50 mg by mouth Daily.     • triamcinolone (KENALOG) 0.1 % cream Apply  topically 2 (Two) Times a Day. (Patient taking differently: Apply 1 application topically to the appropriate area as directed 2 (Two) Times a Day.) 45 g 6     Allergies   Allergen Reactions   • Bee Venom Anaphylaxis   • Sertraline Hcl Other (See Comments)     Nightmares     Past Surgical History:   Procedure Laterality Date   • COLONOSCOPY N/A 5/8/2019    Procedure: COLONOSCOPY;  Surgeon: Alex Bartholomew MD;  Location: [facility] ENDOSCOPY;  Service: Gastroenterology   • D & C HYSTEROSCOPY N/A 2/11/2020    Procedure: DILATATION AND CURETTAGE DIAGNOSTIC HYSTEROSCOPY;  Surgeon: Niya Sun MD;  Location: [facility] OR;  Service: Obstetrics/Gynecology;  Laterality: N/A;   • WISDOM TOOTH EXTRACTION       Family History   Problem Relation Age of Onset   • Depression Mother    • Diabetes Mother    • Hyperlipidemia Mother    • Hypertension Mother    • Arthritis Mother    • Mental illness Mother    • Obesity Mother    • Arthritis Father    • COPD Father    • Depression Father    • Heart disease Father    • Hypertension Father    • Hyperlipidemia Father    • Stroke Father    • Obesity Father    • Mental illness Father    • Heart attack Father    • Depression Maternal Aunt    • Diabetes Maternal Aunt    • Hypertension Maternal Aunt    • Mental illness Maternal Aunt    • Obesity Maternal Aunt    • Cancer Paternal Aunt    • Breast cancer Paternal Aunt    • Diabetes Other         Grandmother    • Mental illness Sister    • Mental illness Sister      Social History     Socioeconomic History   • Marital status:      Spouse name: Not on file   • Number of children: Not on file   • Years of education: Not on file   • Highest education level: Not on file   Tobacco Use   • Smoking status: Current Every  Day Smoker     Packs/day: 1.00     Years: 4.00     Pack years: 4.00     Types: Cigarettes   • Smokeless tobacco: Never Used   Vaping Use   • Vaping Use: Former   • Quit date: 3/28/2021   • Substances: Nicotine   Substance and Sexual Activity   • Alcohol use: Yes     Comment: occ.    • Drug use: Yes     Frequency: 1.0 times per week     Types: Marijuana     Comment: Reported marijuana use for the past 2-3 years   • Sexual activity: Yes     Partners: Male     Birth control/protection: None     Review of Systems  All systems were reviewed and negative except for:  Genitourinary: postivie for  abnormal menstrual bleeding     Objective  Recent Vitals       9/27/2021 9/28/2021 9/28/2021       BP:  115/51  118/64  --     Pulse:  101  --  --     Temp:  98 °F (36.7 °C)  --  --     Weight:  --  (!) 158 kg (348 lb)  (!) 157 kg (345 lb 12.8 oz)     BMI (Calculated):  --  57.9  57.5         Physical Exam:  General Appearance: alert, appears stated age and cooperative  Head: normocephalic, without obvious abnormality and atraumatic  Eyes: lids and lashes normal, conjunctivae and sclerae normal, no icterus, no pallor and corneas clear  Lungs: clear to auscultation, respirations regular, respirations even and respirations unlabored  Heart: regular rhythm and normal rate, normal S1, S2, no murmur, gallop, or rubs and no click  Abdomen: normal bowel sounds, no masses, no hepatomegaly, no splenomegaly, soft non-tender, no guarding and no rebound tenderness  Extremities: moves extremities well, no edema, no cyanosis and no redness  Skin: no bleeding, bruising or rash and no lesions noted  Psych: normal mood and affect, oriented to person, time and place, thought content organized and appropriate judgment    Labs  Lab Results   Component Value Date     09/28/2021    HGB 13.5 09/28/2021    HCT 41.6 09/28/2021    WBC 13.13 (H) 09/28/2021     09/27/2021    K 3.8 09/27/2021     09/27/2021    CO2 20.2 (L) 09/27/2021    BUN  10 09/27/2021    CREATININE 0.61 09/27/2021    GLUCOSE 132 (H) 09/27/2021    ALBUMIN 3.80 09/27/2021    CALCIUM 8.9 09/27/2021    AST 13 09/27/2021    ALT 13 09/27/2021    BILITOT 0.2 09/27/2021      Lab Results   Component Value Date    SQUAMEPIUA Unable to determine due to loaded field (A) 09/28/2021    SPECGRAVUR 1.023 09/28/2021    KETONESU Negative 09/28/2021    BLOODU Large (3+) (A) 09/28/2021    LEUKOCYTESUR Trace (A) 09/28/2021    NITRITEU Negative 09/28/2021    RBCUA Too Numerous to Count (A) 09/28/2021    WBCUA Unable to determine due to loaded field (A) 09/28/2021    BACTERIA Unable to determine due to loaded field (A) 09/28/2021          Lab Results   Component Value Date    URINECX 25,000 CFU/mL Mixed Gabriela Isolated 09/28/2021        Assessment   1. Menorrhagia  2. Thickened endometrium on transvaginal ultrasound  3. Morbid obesity     Plan   1. D&C with diagnostic hysteroscopy.  2. ABx and DVT prophylaxis as indicated.  3. Risks, complications, benefits, and other alternatives discussed.  4. All questions answered and pt in agreement with plan.    Niya Sun M.D.  10/5/2021  06:29 EDT

## 2021-10-07 LAB
LAB AP CASE REPORT: NORMAL
PATH REPORT.FINAL DX SPEC: NORMAL

## 2021-10-08 RX ORDER — TOPIRAMATE 50 MG/1
TABLET, FILM COATED ORAL
Qty: 60 TABLET | Refills: 0 | Status: SHIPPED | OUTPATIENT
Start: 2021-10-08 | End: 2022-03-29 | Stop reason: SDUPTHER

## 2021-10-15 ENCOUNTER — OFFICE VISIT (OUTPATIENT)
Dept: OBSTETRICS AND GYNECOLOGY | Facility: CLINIC | Age: 26
End: 2021-10-15

## 2021-10-15 VITALS
SYSTOLIC BLOOD PRESSURE: 128 MMHG | BODY MASS INDEX: 48.82 KG/M2 | HEIGHT: 65 IN | DIASTOLIC BLOOD PRESSURE: 74 MMHG | WEIGHT: 293 LBS

## 2021-10-15 DIAGNOSIS — Z09 POSTOP CHECK: Primary | ICD-10-CM

## 2021-10-15 PROCEDURE — 99024 POSTOP FOLLOW-UP VISIT: CPT | Performed by: MIDWIFE

## 2021-10-15 NOTE — PROGRESS NOTES
"Subjective   Chief Complaint   Patient presents with   • Post-op     Post Op D&C Hyst 10/5/2021 Dr Sun. Patient states she is doing well and stopped bleeding 10/14/2021     Gracie Clemente is a 26 y.o. year old  presenting to be seen for her post-operative visit.  Currently she reports no problems with eating, bowel movements, voiding, or wound drainage and pain is well controlled. Bleeding stopped yesterday    The pathology results from her procedure are in Gracie's record and are benign.      OTHER COMPLAINTS:  Nothing else       Objective   /74   Ht 165.1 cm (65\")   Wt (!) 157 kg (347 lb)   LMP  (LMP Unknown)   Breastfeeding No   BMI 57.74 kg/m²     General:  well developed; well nourished  no acute distress   Abdomen: Not performed.   Pelvis: Not performed.          Assessment   1. S/P hysteroscopy and D+C     Plan   1. May return to full activity with no restrictions  2. Follow up PRN     No orders of the defined types were placed in this encounter.         This note was electronically signed.  Estela Quiles, APRN  October 15, 2021  "

## 2021-12-28 ENCOUNTER — OFFICE VISIT (OUTPATIENT)
Dept: PRIMARY CARE CLINIC | Age: 26
End: 2021-12-28
Payer: MEDICAID

## 2021-12-28 VITALS — TEMPERATURE: 98 F | HEART RATE: 114 BPM | RESPIRATION RATE: 18 BRPM | OXYGEN SATURATION: 98 %

## 2021-12-28 DIAGNOSIS — Z11.52 ENCOUNTER FOR SCREENING FOR COVID-19: ICD-10-CM

## 2021-12-28 DIAGNOSIS — Z20.822 SUSPECTED COVID-19 VIRUS INFECTION: ICD-10-CM

## 2021-12-28 DIAGNOSIS — J02.9 ACUTE PHARYNGITIS, UNSPECIFIED ETIOLOGY: Primary | ICD-10-CM

## 2021-12-28 PROCEDURE — G8421 BMI NOT CALCULATED: HCPCS | Performed by: NURSE PRACTITIONER

## 2021-12-28 PROCEDURE — G8484 FLU IMMUNIZE NO ADMIN: HCPCS | Performed by: NURSE PRACTITIONER

## 2021-12-28 PROCEDURE — 99213 OFFICE O/P EST LOW 20 MIN: CPT | Performed by: NURSE PRACTITIONER

## 2021-12-28 PROCEDURE — G8427 DOCREV CUR MEDS BY ELIG CLIN: HCPCS | Performed by: NURSE PRACTITIONER

## 2021-12-28 PROCEDURE — 1036F TOBACCO NON-USER: CPT | Performed by: NURSE PRACTITIONER

## 2021-12-28 RX ORDER — AZITHROMYCIN 250 MG/1
250 TABLET, FILM COATED ORAL SEE ADMIN INSTRUCTIONS
Qty: 6 TABLET | Refills: 0 | Status: SHIPPED | OUTPATIENT
Start: 2021-12-28 | End: 2022-01-02

## 2021-12-28 RX ORDER — PREDNISONE 10 MG/1
10 TABLET ORAL 2 TIMES DAILY
Qty: 10 TABLET | Refills: 0 | Status: SHIPPED | OUTPATIENT
Start: 2021-12-28 | End: 2022-01-02

## 2021-12-28 ASSESSMENT — ENCOUNTER SYMPTOMS: SORE THROAT: 1

## 2021-12-28 NOTE — PROGRESS NOTES
SUBJECTIVE:    Patient ID: Rosanna Orozco is a 27 y. o.female. Chief Complaint   Patient presents with    Pharyngitis         HPI:    Pt c/o sore throat 2-3 days. Exposed to family members with virus that tested neg covid. Some body aches at night. Hx strep but does not feel like strep this time per patient. No fevers, chills, CP, cough. No treatments. Patient's medications, allergies, past medical, surgical, social and family histories were reviewed and updated as appropriate in electronic medical record. No outpatient medications have been marked as taking for the 12/28/21 encounter (Office Visit) with JAREN Guillen CNP. Review of Systems   HENT: Positive for sore throat. Musculoskeletal: Positive for myalgias. All other systems reviewed and are negative.       Past Medical History:   Diagnosis Date    Anemia     Anxiety     Borderline personality disorder ([address]     Depression     GERD (gastroesophageal reflux disease)     Hypothyroidism     OCD (obsessive compulsive disorder)     PCOS (polycystic ovarian syndrome)     Trichotillomania      Past Surgical History:   Procedure Laterality Date    WISDOM TOOTH EXTRACTION       Family History   Problem Relation Age of Onset    Diabetes Mother     Depression Mother     High Blood Pressure Father     Depression Sister     Depression Paternal Aunt     Cancer Paternal Aunt         lung    Diabetes Maternal Grandmother     Depression Maternal Grandmother     High Blood Pressure Maternal Grandmother     Diabetes Maternal Grandfather     High Blood Pressure Maternal Grandfather     Stroke Maternal Grandfather     Diabetes Paternal Grandmother     Cancer Paternal Grandfather         lung      Social History     Tobacco Use   Smoking Status Former Smoker    Packs/day: 0.50    Years: 2.00    Pack years: 1.00    Types: Cigarettes    Quit date: 12/2018    Years since quitting: 3.0   Smokeless Tobacco Never Used       OBJECTIVE:   Wt Readings from Last 3 Encounters:   09/23/20 (!) 353 lb (160.1 kg)   09/06/20 (!) 326 lb (147.9 kg)   06/21/20 (!) 353 lb 9 oz (160.4 kg)     BP Readings from Last 3 Encounters:   10/16/20 110/72   09/23/20 102/62   09/06/20 133/79       Pulse 114   Temp 98 °F (36.7 °C)   Resp 18   SpO2 98%      Physical Exam  Vitals and nursing note reviewed. Constitutional:       General: She is not in acute distress. Appearance: Normal appearance. HENT:      Head: Normocephalic. Nose: Nose normal.      Mouth/Throat:      Mouth: Mucous membranes are moist.      Pharynx: Oropharynx is clear. Posterior oropharyngeal erythema present. Tonsils: 3+ on the right. 3+ on the left. Comments: Reports hx enlarged tonsils-her normal  Eyes:      Conjunctiva/sclera: Conjunctivae normal.   Cardiovascular:      Rate and Rhythm: Normal rate and regular rhythm. Pulmonary:      Effort: Pulmonary effort is normal. No respiratory distress. Breath sounds: Normal breath sounds. No wheezing. Abdominal:      Tenderness: There is no guarding. Musculoskeletal:      Cervical back: Normal range of motion. Neurological:      Mental Status: She is alert and oriented to person, place, and time. Psychiatric:         Mood and Affect: Mood normal.         Thought Content:  Thought content normal.         Lab Results   Component Value Date     06/21/2020    K 4.2 06/21/2020     06/21/2020    CO2 23 06/21/2020    GLUCOSE 117 06/21/2020    BUN 11 06/21/2020    CREATININE 0.8 06/21/2020    CREATININE 0.6 03/21/2011    CALCIUM 9.3 06/21/2020    PROT 8.5 02/26/2017    LABALBU 4.4 02/26/2017    BILITOT 0.3 02/26/2017    ALT 15 02/26/2017    AST 16 02/26/2017       Microscopic Examination (no units)   Date Value   06/21/2020 YES         Lab Results   Component Value Date    WBC 13.7 06/21/2020    NEUTROABS 8.2 01/02/2020    HGB 13.9 06/21/2020    HCT 43.9 06/21/2020    MCV 86.1 06/21/2020    PLT 246 06/21/2020       No results found for: TSH      ASSESSMENT/PLAN:     1. Acute pharyngitis, unspecified etiology  Take medications as prescribed. Continue home medications. Discussed symptom management. Return to clinic S&S worsen or no improvement noted. Patient verbalized understanding.     - azithromycin (ZITHROMAX) 250 MG tablet; Take 1 tablet by mouth See Admin Instructions for 5 days 500mg on day 1 followed by 250mg on days 2 - 5  Dispense: 6 tablet; Refill: 0    2. Encounter for screening for COVID-19  3. Suspected COVID-19 virus infection  Covid testing today. Education provided. Discussed symptom management such as increase fluids, contact precautions/wearing mask, tylenol/ibuprofen PRN, frequent hand washing/sanitizing, OTC multivitamin daily with food, healthy diet.   Patient verbalized understanding.     - [facility]&/OR CONVEY OF SPEC FOR TR OFFICE TO LAB        Orders Placed This Encounter   Medications    azithromycin (ZITHROMAX) 250 MG tablet     Sig: Take 1 tablet by mouth See Admin Instructions for 5 days 500mg on day 1 followed by 250mg on days 2 - 5     Dispense:  6 tablet     Refill:  0    predniSONE (DELTASONE) 10 MG tablet     Sig: Take 1 tablet by mouth 2 times daily for 5 days     Dispense:  10 tablet     Refill:  0

## 2021-12-28 NOTE — PROGRESS NOTES
Chief Complaint   Patient presents with    Pharyngitis           2021    Priscilla Bright (:  1995) is a 27 y.o. female, here requesting COVID-19 testing    History of Present Illness  soralejo red    Vitals:    21 1428   Pulse: 114   Resp: 18   Temp: 98 °F (36.7 °C)   SpO2: 98%       ASSESSMENT  Screening for COVID-19/ Viral disease    PLAN  POCT influenza testing Not Tested  COVID-19 sample collected and submitted  Patient given detailed CDC instructions contained within After Visit Summary       An  electronic signature was used to authenticate this note.     --Devonte Mckeon MA on 2021 at 2:29 PM

## 2022-01-03 ENCOUNTER — TELEPHONE (OUTPATIENT)
Dept: PRIMARY CARE CLINIC | Age: 27
End: 2022-01-03

## 2022-01-03 NOTE — TELEPHONE ENCOUNTER
----- Message from Angelia Boagusalejo sent at 12/30/2021 12:51 PM EST -----  Subject: Results Request    QUESTIONS  Which lab or imaging result is the patient calling about? covid test   Which provider ordered the test? Shad Herzog   At what location was the test performed? Date the test was performed? 2021-12-28  Additional Information for Provider?   ---------------------------------------------------------------------------  --------------  CALL BACK INFO  What is the best way for the office to contact you? OK to leave message on   voicemail  Preferred Call Back Phone Number?  [phone]

## 2022-03-29 ENCOUNTER — HOSPITAL ENCOUNTER (OUTPATIENT)
Facility: HOSPITAL | Age: 27
Discharge: HOME | End: 2022-03-29
Payer: MEDICAID

## 2022-03-29 ENCOUNTER — OFFICE VISIT (OUTPATIENT)
Dept: PRIMARY CARE CLINIC | Age: 27
End: 2022-03-29
Payer: MEDICAID

## 2022-03-29 VITALS
WEIGHT: 293 LBS | HEART RATE: 100 BPM | DIASTOLIC BLOOD PRESSURE: 73 MMHG | BODY MASS INDEX: 48.82 KG/M2 | OXYGEN SATURATION: 99 % | HEIGHT: 65 IN | TEMPERATURE: 98.3 F | SYSTOLIC BLOOD PRESSURE: 124 MMHG

## 2022-03-29 DIAGNOSIS — F41.9 ANXIETY AND DEPRESSION: Primary | ICD-10-CM

## 2022-03-29 DIAGNOSIS — F32.A ANXIETY AND DEPRESSION: ICD-10-CM

## 2022-03-29 DIAGNOSIS — F60.3 BORDERLINE PERSONALITY DISORDER (HCC): ICD-10-CM

## 2022-03-29 DIAGNOSIS — F32.A ANXIETY AND DEPRESSION: Primary | ICD-10-CM

## 2022-03-29 DIAGNOSIS — F41.9 ANXIETY AND DEPRESSION: ICD-10-CM

## 2022-03-29 LAB
A/G RATIO: 1 (ref 0.8–2)
ALBUMIN SERPL-MCNC: 3.8 G/DL (ref 3.4–4.8)
ALP BLD-CCNC: 81 U/L (ref 25–100)
ALT SERPL-CCNC: 15 U/L (ref 4–36)
ANION GAP SERPL CALCULATED.3IONS-SCNC: 13 MMOL/L (ref 3–16)
AST SERPL-CCNC: 17 U/L (ref 8–33)
BASOPHILS ABSOLUTE: 0.1 K/UL (ref 0–0.1)
BASOPHILS RELATIVE PERCENT: 0.7 %
BILIRUB SERPL-MCNC: 0.4 MG/DL (ref 0.3–1.2)
BUN BLDV-MCNC: 10 MG/DL (ref 6–20)
CALCIUM SERPL-MCNC: 9.2 MG/DL (ref 8.5–10.5)
CHLORIDE BLD-SCNC: 102 MMOL/L (ref 98–107)
CO2: 25 MMOL/L (ref 20–30)
CREAT SERPL-MCNC: 0.6 MG/DL (ref 0.4–1.2)
EOSINOPHILS ABSOLUTE: 0.5 K/UL (ref 0–0.4)
EOSINOPHILS RELATIVE PERCENT: 4.3 %
GFR AFRICAN AMERICAN: >59
GFR NON-AFRICAN AMERICAN: >60
GLOBULIN: 3.7 G/DL
GLUCOSE BLD-MCNC: 109 MG/DL (ref 74–106)
HCT VFR BLD CALC: 46.3 % (ref 37–47)
HEMOGLOBIN: 14.4 G/DL (ref 11.5–16.5)
IMMATURE GRANULOCYTES #: 0.1 K/UL
IMMATURE GRANULOCYTES %: 0.4 % (ref 0–5)
LYMPHOCYTES ABSOLUTE: 3.1 K/UL (ref 1.5–4)
LYMPHOCYTES RELATIVE PERCENT: 26.9 %
MCH RBC QN AUTO: 27.4 PG (ref 27–32)
MCHC RBC AUTO-ENTMCNC: 31.1 G/DL (ref 31–35)
MCV RBC AUTO: 88 FL (ref 80–100)
MONOCYTES ABSOLUTE: 0.6 K/UL (ref 0.2–0.8)
MONOCYTES RELATIVE PERCENT: 5.1 %
NEUTROPHILS ABSOLUTE: 7.2 K/UL (ref 2–7.5)
NEUTROPHILS RELATIVE PERCENT: 62.6 %
PDW BLD-RTO: 13.6 % (ref 11–16)
PLATELET # BLD: 240 K/UL (ref 150–400)
PMV BLD AUTO: 12.1 FL (ref 6–10)
POTASSIUM SERPL-SCNC: 4.4 MMOL/L (ref 3.4–5.1)
RBC # BLD: 5.26 M/UL (ref 3.8–5.8)
SODIUM BLD-SCNC: 140 MMOL/L (ref 136–145)
T4 FREE: 0.87 NG/DL (ref 0.89–1.76)
TOTAL PROTEIN: 7.5 G/DL (ref 6.4–8.3)
TSH SERPL DL<=0.05 MIU/L-ACNC: 2.06 UIU/ML (ref 0.27–4.2)
WBC # BLD: 11.5 K/UL (ref 4–11)

## 2022-03-29 PROCEDURE — 85025 COMPLETE CBC W/AUTO DIFF WBC: CPT

## 2022-03-29 PROCEDURE — G8484 FLU IMMUNIZE NO ADMIN: HCPCS | Performed by: PHYSICIAN ASSISTANT

## 2022-03-29 PROCEDURE — 99214 OFFICE O/P EST MOD 30 MIN: CPT | Performed by: PHYSICIAN ASSISTANT

## 2022-03-29 PROCEDURE — G8417 CALC BMI ABV UP PARAM F/U: HCPCS | Performed by: PHYSICIAN ASSISTANT

## 2022-03-29 PROCEDURE — 84439 ASSAY OF FREE THYROXINE: CPT

## 2022-03-29 PROCEDURE — 4004F PT TOBACCO SCREEN RCVD TLK: CPT | Performed by: PHYSICIAN ASSISTANT

## 2022-03-29 PROCEDURE — 84443 ASSAY THYROID STIM HORMONE: CPT

## 2022-03-29 PROCEDURE — G8427 DOCREV CUR MEDS BY ELIG CLIN: HCPCS | Performed by: PHYSICIAN ASSISTANT

## 2022-03-29 PROCEDURE — 80053 COMPREHEN METABOLIC PANEL: CPT

## 2022-03-29 RX ORDER — TOPIRAMATE 50 MG/1
TABLET, FILM COATED ORAL
Qty: 90 TABLET | Refills: 2 | Status: SHIPPED | OUTPATIENT
Start: 2022-03-29 | End: 2022-07-29 | Stop reason: SDUPTHER

## 2022-03-29 RX ORDER — FLUOXETINE HYDROCHLORIDE 20 MG/1
CAPSULE ORAL
Qty: 90 CAPSULE | Refills: 1 | Status: SHIPPED | OUTPATIENT
Start: 2022-03-29 | End: 2022-07-29 | Stop reason: SDUPTHER

## 2022-03-29 SDOH — HEALTH STABILITY: PHYSICAL HEALTH: ON AVERAGE, HOW MANY DAYS PER WEEK DO YOU ENGAGE IN MODERATE TO STRENUOUS EXERCISE (LIKE A BRISK WALK)?: 0 DAYS

## 2022-03-29 ASSESSMENT — PATIENT HEALTH QUESTIONNAIRE - PHQ9
7. TROUBLE CONCENTRATING ON THINGS, SUCH AS READING THE NEWSPAPER OR WATCHING TELEVISION: 3
3. TROUBLE FALLING OR STAYING ASLEEP: 3
SUM OF ALL RESPONSES TO PHQ QUESTIONS 1-9: 22
SUM OF ALL RESPONSES TO PHQ QUESTIONS 1-9: 21
8. MOVING OR SPEAKING SO SLOWLY THAT OTHER PEOPLE COULD HAVE NOTICED. OR THE OPPOSITE, BEING SO FIGETY OR RESTLESS THAT YOU HAVE BEEN MOVING AROUND A LOT MORE THAN USUAL: 1
5. POOR APPETITE OR OVEREATING: 3
4. FEELING TIRED OR HAVING LITTLE ENERGY: 3
SUM OF ALL RESPONSES TO PHQ QUESTIONS 1-9: 22
1. LITTLE INTEREST OR PLEASURE IN DOING THINGS: 3
SUM OF ALL RESPONSES TO PHQ QUESTIONS 1-9: 22
2. FEELING DOWN, DEPRESSED OR HOPELESS: 3
SUM OF ALL RESPONSES TO PHQ9 QUESTIONS 1 & 2: 6
10. IF YOU CHECKED OFF ANY PROBLEMS, HOW DIFFICULT HAVE THESE PROBLEMS MADE IT FOR YOU TO DO YOUR WORK, TAKE CARE OF THINGS AT HOME, OR GET ALONG WITH OTHER PEOPLE: 3
9. THOUGHTS THAT YOU WOULD BE BETTER OFF DEAD, OR OF HURTING YOURSELF: 1
6. FEELING BAD ABOUT YOURSELF - OR THAT YOU ARE A FAILURE OR HAVE LET YOURSELF OR YOUR FAMILY DOWN: 2

## 2022-03-29 ASSESSMENT — SOCIAL DETERMINANTS OF HEALTH (SDOH)
WITHIN THE LAST YEAR, HAVE TO BEEN RAPED OR FORCED TO HAVE ANY KIND OF SEXUAL ACTIVITY BY YOUR PARTNER OR EX-PARTNER?: NO
WITHIN THE LAST YEAR, HAVE YOU BEEN HUMILIATED OR EMOTIONALLY ABUSED IN OTHER WAYS BY YOUR PARTNER OR EX-PARTNER?: NO
WITHIN THE LAST YEAR, HAVE YOU BEEN KICKED, HIT, SLAPPED, OR OTHERWISE PHYSICALLY HURT BY YOUR PARTNER OR EX-PARTNER?: NO
WITHIN THE LAST YEAR, HAVE YOU BEEN AFRAID OF YOUR PARTNER OR EX-PARTNER?: NO

## 2022-03-29 ASSESSMENT — COLUMBIA-SUICIDE SEVERITY RATING SCALE - C-SSRS
6. HAVE YOU EVER DONE ANYTHING, STARTED TO DO ANYTHING, OR PREPARED TO DO ANYTHING TO END YOUR LIFE?: NO
2. HAVE YOU ACTUALLY HAD ANY THOUGHTS OF KILLING YOURSELF?: NO
1. WITHIN THE PAST MONTH, HAVE YOU WISHED YOU WERE DEAD OR WISHED YOU COULD GO TO SLEEP AND NOT WAKE UP?: NO

## 2022-03-29 ASSESSMENT — ENCOUNTER SYMPTOMS
SHORTNESS OF BREATH: 0
COUGH: 0
EYE PAIN: 0
ABDOMINAL PAIN: 0
DIARRHEA: 0
CONSTIPATION: 0
SORE THROAT: 0
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
## 2022-03-29 NOTE — PROGRESS NOTES
Chief Complaint   Patient presents with    Establish Care    Anxiety     Pt here today to establish care for anxiety    Have you seen any other physician or provider since your last visit no    Have you had any other diagnostic tests since your last visit? no    Have you changed or stopped any medications since your last visit? no

## 2022-03-29 NOTE — LETTER
[facility]  [address] [place] [address]  Phone: [phone]  Fax: [phone]    Mercedez Owen PA-C        March 29, 2022     Patient: Hortencia Dallas   YOB: 1995   Date of Visit: 3/29/2022       To Whom It May Concern: It is my medical opinion that Hortencia Dallas may return to work on 3/30/2022. Please excuse her absences from 3/24/2022 through 3/30/2022. If you have any questions or concerns, please don't hesitate to call.     Sincerely,        Mercedez Owen PA-C

## 2022-06-07 ENCOUNTER — OFFICE VISIT (OUTPATIENT)
Dept: PRIMARY CARE CLINIC | Age: 27
End: 2022-06-07
Payer: MEDICAID

## 2022-06-07 VITALS
HEART RATE: 99 BPM | TEMPERATURE: 98 F | DIASTOLIC BLOOD PRESSURE: 83 MMHG | OXYGEN SATURATION: 99 % | RESPIRATION RATE: 18 BRPM | SYSTOLIC BLOOD PRESSURE: 122 MMHG

## 2022-06-07 DIAGNOSIS — N94.6 DYSMENORRHEA: Primary | ICD-10-CM

## 2022-06-07 DIAGNOSIS — H91.91 HEARING LOSS OF RIGHT EAR, UNSPECIFIED HEARING LOSS TYPE: ICD-10-CM

## 2022-06-07 PROCEDURE — G8427 DOCREV CUR MEDS BY ELIG CLIN: HCPCS | Performed by: PHYSICIAN ASSISTANT

## 2022-06-07 PROCEDURE — 4004F PT TOBACCO SCREEN RCVD TLK: CPT | Performed by: PHYSICIAN ASSISTANT

## 2022-06-07 PROCEDURE — G8417 CALC BMI ABV UP PARAM F/U: HCPCS | Performed by: PHYSICIAN ASSISTANT

## 2022-06-07 PROCEDURE — 99213 OFFICE O/P EST LOW 20 MIN: CPT | Performed by: PHYSICIAN ASSISTANT

## 2022-06-07 SDOH — ECONOMIC STABILITY: FOOD INSECURITY: WITHIN THE PAST 12 MONTHS, YOU WORRIED THAT YOUR FOOD WOULD RUN OUT BEFORE YOU GOT MONEY TO BUY MORE.: NEVER TRUE

## 2022-06-07 SDOH — ECONOMIC STABILITY: FOOD INSECURITY: WITHIN THE PAST 12 MONTHS, THE FOOD YOU BOUGHT JUST DIDN'T LAST AND YOU DIDN'T HAVE MONEY TO GET MORE.: NEVER TRUE

## 2022-06-07 ASSESSMENT — SOCIAL DETERMINANTS OF HEALTH (SDOH): HOW HARD IS IT FOR YOU TO PAY FOR THE VERY BASICS LIKE FOOD, HOUSING, MEDICAL CARE, AND HEATING?: NOT HARD AT ALL

## 2022-06-07 ASSESSMENT — ENCOUNTER SYMPTOMS
RESPIRATORY NEGATIVE: 1
GASTROINTESTINAL NEGATIVE: 1

## 2022-06-07 NOTE — PROGRESS NOTES
SUBJECTIVE:    Patient ID: Opal Cadena is a 27 y. o.female. Chief Complaint   Patient presents with    Hearing Problem    Vaginal Bleeding       HPI:    Pt here with c/o waking up this morning with difficulty hearing. She states her hearing is muffled in her right ear. She has chronic tinnitus. She is having minimal pain in her hear. She denies any recent trauma, q tips, in ear headphones. She is also c/o abnormal vaginal bleeding. This has been a chronic problem for the last 2 years. She states she has a menses once every 2 weeks that can last anywhere from a few days to 2 weeks long. She is bleeding lightly. She uses 2-3 pads per day. She shes Dr. Curtis Hooker at [facility] in [place]. She has had 2 D&Cs to try and correct this. She thinks she may have been diagnosed with thickening of the uterine lining. She is not on any BC at this time. She was on progesterone for a short time to prevent bleeding to perform the D&C. She denies actively trying to become pregnant and denies any chance of pregnancy. Patient's medications, allergies, past medical, surgical, social and family histories were reviewed and updated as appropriate in electronic medical record. Current Outpatient Medications on File Prior to Visit   Medication Sig Dispense Refill    topiramate (TOPAMAX) 50 MG tablet Take one tablet daily 90 tablet 2    FLUoxetine (PROZAC) 20 MG capsule Take 1 capsule by mouth once daily 90 capsule 1     No current facility-administered medications on file prior to visit. Review of Systems   Constitutional: Negative. HENT: Positive for hearing loss. Respiratory: Negative. Cardiovascular: Negative. Gastrointestinal: Negative. Genitourinary: Positive for menstrual problem and vaginal bleeding. Skin: Negative. Neurological: Negative. Psychiatric/Behavioral: Negative.         Past Medical History:   Diagnosis Date    Anemia     Anxiety     Borderline personality disorder ([address]  Depression     GERD (gastroesophageal reflux disease)     Headache     Hypothyroidism     Obesity     OCD (obsessive compulsive disorder)     PCOS (polycystic ovarian syndrome)     Trichotillomania      Past Surgical History:   Procedure Laterality Date    WISDOM TOOTH EXTRACTION       Family History   Problem Relation Age of Onset    Diabetes Mother     Depression Mother     High Blood Pressure Father     Alcohol Abuse Father     Stroke Father     Depression Sister     Depression Paternal Aunt     Cancer Paternal Aunt         lung    Diabetes Maternal Grandmother     Depression Maternal Grandmother     High Blood Pressure Maternal Grandmother     Diabetes Maternal Grandfather     High Blood Pressure Maternal Grandfather     Stroke Maternal Grandfather     Diabetes Paternal Grandmother     Cancer Paternal Grandfather         lung      Social History     Tobacco Use   Smoking Status Current Every Day Smoker    Packs/day: 1.00    Years: 2.00    Pack years: 2.00    Types: Cigarettes    Last attempt to quit: 12/2018    Years since quitting: 3.5   Smokeless Tobacco Never Used       OBJECTIVE:   Wt Readings from Last 3 Encounters:   03/29/22 (!) 360 lb 6.4 oz (163.5 kg)   09/23/20 (!) 353 lb (160.1 kg)   09/06/20 (!) 326 lb (147.9 kg)     BP Readings from Last 3 Encounters:   06/07/22 122/83   03/29/22 124/73   10/16/20 110/72       /83 (Site: Left Wrist, Position: Sitting, Cuff Size: Medium Adult)   Pulse 99   Temp 98 °F (36.7 °C) (Temporal)   Resp 18   SpO2 99%    Physical Exam  Vitals reviewed. Constitutional:       General: She is not in acute distress. Appearance: Normal appearance. She is obese. She is not ill-appearing or toxic-appearing. HENT:      Head: Normocephalic and atraumatic.       Right Ear: Tympanic membrane, ear canal and external ear normal.      Left Ear: Tympanic membrane, ear canal and external ear normal.      Mouth/Throat:      Mouth: Mucous membranes are moist.      Pharynx: Oropharynx is clear. Eyes:      Conjunctiva/sclera: Conjunctivae normal.      Pupils: Pupils are equal, round, and reactive to light. Cardiovascular:      Rate and Rhythm: Normal rate and regular rhythm. Heart sounds: Normal heart sounds. No murmur heard. No gallop. Pulmonary:      Effort: Pulmonary effort is normal.      Breath sounds: Normal breath sounds. No wheezing, rhonchi or rales. Skin:     General: Skin is warm and dry. Neurological:      Mental Status: She is alert and oriented to person, place, and time. Psychiatric:         Mood and Affect: Mood normal.         Behavior: Behavior normal.         Thought Content: Thought content normal.         Judgment: Judgment normal.         Lab Results   Component Value Date     03/29/2022    K 4.4 03/29/2022     03/29/2022    CO2 25 03/29/2022    GLUCOSE 109 03/29/2022    BUN 10 03/29/2022    CREATININE 0.6 03/29/2022    CREATININE 0.6 03/21/2011    CALCIUM 9.2 03/29/2022    PROT 7.5 03/29/2022    LABALBU 3.8 03/29/2022    BILITOT 0.4 03/29/2022    ALT 15 03/29/2022    AST 17 03/29/2022     Microscopic Examination (no units)   Date Value   06/21/2020 YES       Lab Results   Component Value Date    WBC 11.5 03/29/2022    NEUTROABS 7.2 03/29/2022    HGB 14.4 03/29/2022    HCT 46.3 03/29/2022    MCV 88.0 03/29/2022     03/29/2022     Lab Results   Component Value Date    TSH 2.06 03/29/2022       ASSESSMENT/PLAN:     1. Dysmenorrhea  - f/u with OB/GYN    2. Hearing loss of right ear, unspecified hearing loss type  - External Referral To ENT      No orders of the defined types were placed in this encounter.        Electronically signed by EZIO Bernardo on 6/7/2022 at 2:39 PM

## 2022-06-07 NOTE — PROGRESS NOTES
Chief Complaint   Patient presents with    Hearing Problem    Vaginal Bleeding       Pt complains of loss of hearing started this morning.

## 2022-06-13 ENCOUNTER — OFFICE VISIT (OUTPATIENT)
Dept: PRIMARY CARE CLINIC | Age: 27
End: 2022-06-13
Payer: MEDICAID

## 2022-06-13 VITALS
HEART RATE: 86 BPM | WEIGHT: 293 LBS | OXYGEN SATURATION: 98 % | DIASTOLIC BLOOD PRESSURE: 83 MMHG | HEIGHT: 65 IN | SYSTOLIC BLOOD PRESSURE: 127 MMHG | BODY MASS INDEX: 48.82 KG/M2

## 2022-06-13 DIAGNOSIS — N30.90 CYSTITIS WITHOUT HEMATURIA: Primary | ICD-10-CM

## 2022-06-13 DIAGNOSIS — F32.4 MAJOR DEPRESSIVE DISORDER WITH SINGLE EPISODE, IN PARTIAL REMISSION (HCC): ICD-10-CM

## 2022-06-13 DIAGNOSIS — F32.A ANXIETY AND DEPRESSION: ICD-10-CM

## 2022-06-13 DIAGNOSIS — H65.91 RIGHT OTITIS MEDIA WITH EFFUSION: ICD-10-CM

## 2022-06-13 DIAGNOSIS — Z13.31 POSITIVE DEPRESSION SCREENING: ICD-10-CM

## 2022-06-13 DIAGNOSIS — F41.9 ANXIETY AND DEPRESSION: ICD-10-CM

## 2022-06-13 LAB
BILIRUBIN, POC: NORMAL
BLOOD URINE, POC: NORMAL
CLARITY, POC: NORMAL
COLOR, POC: NORMAL
GLUCOSE URINE, POC: NORMAL
KETONES, POC: NORMAL
LEUKOCYTE EST, POC: NORMAL
NITRITE, POC: NORMAL
PH, POC: 7.5
PROTEIN, POC: NORMAL
SPECIFIC GRAVITY, POC: 1.01
UROBILINOGEN, POC: NORMAL

## 2022-06-13 PROCEDURE — G8427 DOCREV CUR MEDS BY ELIG CLIN: HCPCS | Performed by: PHYSICIAN ASSISTANT

## 2022-06-13 PROCEDURE — G8417 CALC BMI ABV UP PARAM F/U: HCPCS | Performed by: PHYSICIAN ASSISTANT

## 2022-06-13 PROCEDURE — 81002 URINALYSIS NONAUTO W/O SCOPE: CPT | Performed by: PHYSICIAN ASSISTANT

## 2022-06-13 PROCEDURE — 99214 OFFICE O/P EST MOD 30 MIN: CPT | Performed by: PHYSICIAN ASSISTANT

## 2022-06-13 PROCEDURE — 4004F PT TOBACCO SCREEN RCVD TLK: CPT | Performed by: PHYSICIAN ASSISTANT

## 2022-06-13 RX ORDER — FLUTICASONE PROPIONATE 50 MCG
1 SPRAY, SUSPENSION (ML) NASAL DAILY
Qty: 32 G | Refills: 1 | Status: SHIPPED | OUTPATIENT
Start: 2022-06-13 | End: 2022-11-03

## 2022-06-13 RX ORDER — SULFAMETHOXAZOLE AND TRIMETHOPRIM 800; 160 MG/1; MG/1
1 TABLET ORAL 2 TIMES DAILY
Qty: 20 TABLET | Refills: 0 | Status: SHIPPED | OUTPATIENT
Start: 2022-06-13 | End: 2022-06-23

## 2022-06-13 ASSESSMENT — PATIENT HEALTH QUESTIONNAIRE - PHQ9
SUM OF ALL RESPONSES TO PHQ QUESTIONS 1-9: 12
7. TROUBLE CONCENTRATING ON THINGS, SUCH AS READING THE NEWSPAPER OR WATCHING TELEVISION: 0
5. POOR APPETITE OR OVEREATING: 3
2. FEELING DOWN, DEPRESSED OR HOPELESS: 1
8. MOVING OR SPEAKING SO SLOWLY THAT OTHER PEOPLE COULD HAVE NOTICED. OR THE OPPOSITE, BEING SO FIGETY OR RESTLESS THAT YOU HAVE BEEN MOVING AROUND A LOT MORE THAN USUAL: 0
4. FEELING TIRED OR HAVING LITTLE ENERGY: 3
SUM OF ALL RESPONSES TO PHQ QUESTIONS 1-9: 12
SUM OF ALL RESPONSES TO PHQ QUESTIONS 1-9: 12
3. TROUBLE FALLING OR STAYING ASLEEP: 3
6. FEELING BAD ABOUT YOURSELF - OR THAT YOU ARE A FAILURE OR HAVE LET YOURSELF OR YOUR FAMILY DOWN: 2
10. IF YOU CHECKED OFF ANY PROBLEMS, HOW DIFFICULT HAVE THESE PROBLEMS MADE IT FOR YOU TO DO YOUR WORK, TAKE CARE OF THINGS AT HOME, OR GET ALONG WITH OTHER PEOPLE: 1
9. THOUGHTS THAT YOU WOULD BE BETTER OFF DEAD, OR OF HURTING YOURSELF: 0
SUM OF ALL RESPONSES TO PHQ QUESTIONS 1-9: 12

## 2022-06-13 ASSESSMENT — ENCOUNTER SYMPTOMS
SHORTNESS OF BREATH: 0
BACK PAIN: 1
EYE PAIN: 0
COUGH: 0
ABDOMINAL PAIN: 0
SORE THROAT: 0
CONSTIPATION: 0
DIARRHEA: 0

## 2022-06-13 NOTE — LETTER
[facility]  [address] [place] [address]  Phone: [phone]  Fax: [phone]    Ansley Vega PA-C        June 13, 2022     Patient: Jess Bright   YOB: 1995   Date of Visit: 6/13/2022       To Whom It May Concern: It is my medical opinion that Jess Bright may return to work on 6/14/2022. Please excuse her 6/10/2022 as well. .    If you have any questions or concerns, please don't hesitate to call.     Sincerely,        Ansley Vega PA-C

## 2022-06-13 NOTE — PROGRESS NOTES
SUBJECTIVE:    Patient ID: Scott Howell is a 27 y. o.female. Chief Complaint   Patient presents with    Follow-up    Dysmenorrhea    Incontinence     Urinary         HPI:  Ms. Obdulia Briones is a pleasant 27 year female with PMH anxiety and depression and history of borderline personality disorder. She is followed by [address] [place] [address] for counseling. She started a new job at [employer] and her anxiety , depression and stress has improved significantly. She has continued medications of Topamax and Prozac. PHQ 9 is 12 today down from score 22 last visit in March. Today she is having urinary urgency and frequency and back pain; she denies any fever, chills , nausea vomtiing or diarrhea. She was on menses 2 days ago. She also complains of right ear pain ongoing for the past week. She denies any drainage; no fever or chills. Patient's medications, allergies, past medical, surgical, social and family histories were reviewed and updated as appropriate in electronic medical record. Outpatient Medications Marked as Taking for the 6/13/22 encounter (Office Visit) with Ira Fritz PA-C   Medication Sig Dispense Refill    sulfamethoxazole-trimethoprim (BACTRIM DS;SEPTRA DS) 800-160 MG per tablet Take 1 tablet by mouth 2 times daily for 10 days 20 tablet 0    topiramate (TOPAMAX) 50 MG tablet Take one tablet daily 90 tablet 2    FLUoxetine (PROZAC) 20 MG capsule Take 1 capsule by mouth once daily 90 capsule 1        Review of Systems   Constitutional: Negative for chills, fatigue and fever. HENT: Negative for congestion, ear pain, nosebleeds and sore throat. Eyes: Negative for pain and visual disturbance. Respiratory: Negative for cough and shortness of breath. Cardiovascular: Negative for chest pain and palpitations. Gastrointestinal: Negative for abdominal pain, constipation and diarrhea. Genitourinary: Positive for frequency and urgency.  Negative for difficulty urinating and flank pain.   Musculoskeletal: Positive for back pain. Negative for arthralgias, gait problem and myalgias. Skin: Negative for rash. Neurological: Negative for syncope, light-headedness and headaches. Psychiatric/Behavioral: Negative for behavioral problems, confusion and dysphoric mood.        Past Medical History:   Diagnosis Date    Anemia     Anxiety     Borderline personality disorder ([address]     Depression     GERD (gastroesophageal reflux disease)     Headache     Hypothyroidism     Obesity     OCD (obsessive compulsive disorder)     PCOS (polycystic ovarian syndrome)     Trichotillomania      Past Surgical History:   Procedure Laterality Date    WISDOM TOOTH EXTRACTION       Family History   Problem Relation Age of Onset    Diabetes Mother     Depression Mother     High Blood Pressure Father     Alcohol Abuse Father     Stroke Father     Depression Sister     Depression Paternal Aunt     Cancer Paternal Aunt         lung    Diabetes Maternal Grandmother     Depression Maternal Grandmother     High Blood Pressure Maternal Grandmother     Diabetes Maternal Grandfather     High Blood Pressure Maternal Grandfather     Stroke Maternal Grandfather     Diabetes Paternal Grandmother     Cancer Paternal Grandfather         lung      Social History     Tobacco Use   Smoking Status Current Every Day Smoker    Packs/day: 1.00    Years: 2.00    Pack years: 2.00    Types: Cigarettes    Last attempt to quit: 12/2018    Years since quitting: 3.5   Smokeless Tobacco Never Used       OBJECTIVE:   Wt Readings from Last 3 Encounters:   06/13/22 (!) 354 lb 10.4 oz (160.9 kg)   03/29/22 (!) 360 lb 6.4 oz (163.5 kg)   09/23/20 (!) 353 lb (160.1 kg)     BP Readings from Last 3 Encounters:   06/13/22 127/83   06/07/22 122/83   03/29/22 124/73       /83 (Site: Left Lower Arm, Position: Sitting)   Pulse 86   Ht 5' 5\" (1.651 m)   Wt (!) 354 lb 10.4 oz (160.9 kg)   SpO2 98%   BMI 59.02 kg/m² Physical Exam  Vitals reviewed. Constitutional:       Appearance: Normal appearance. HENT:      Head: Normocephalic and atraumatic. Right Ear: External ear normal. Decreased hearing noted. A middle ear effusion is present. Tympanic membrane is injected and erythematous. Left Ear: External ear normal.      Nose: Nose normal.      Mouth/Throat:      Mouth: Mucous membranes are moist.      Pharynx: Oropharynx is clear. Eyes:      Extraocular Movements: Extraocular movements intact. Conjunctiva/sclera: Conjunctivae normal.      Pupils: Pupils are equal, round, and reactive to light. Cardiovascular:      Rate and Rhythm: Normal rate and regular rhythm. Pulses: Normal pulses. Heart sounds: Normal heart sounds. Pulmonary:      Effort: Pulmonary effort is normal.      Breath sounds: Normal breath sounds. Abdominal:      General: Bowel sounds are normal.      Palpations: Abdomen is soft. Tenderness: There is left CVA tenderness. Musculoskeletal:         General: Normal range of motion. Cervical back: Normal range of motion. Skin:     General: Skin is warm. Findings: No rash. Neurological:      General: No focal deficit present. Mental Status: She is alert and oriented to person, place, and time. Psychiatric:         Mood and Affect: Mood normal.         Thought Content:  Thought content normal.         Lab Results   Component Value Date     03/29/2022    K 4.4 03/29/2022     03/29/2022    CO2 25 03/29/2022    GLUCOSE 109 03/29/2022    BUN 10 03/29/2022    CREATININE 0.6 03/29/2022    CREATININE 0.6 03/21/2011    CALCIUM 9.2 03/29/2022    PROT 7.5 03/29/2022    LABALBU 3.8 03/29/2022    BILITOT 0.4 03/29/2022    ALT 15 03/29/2022    AST 17 03/29/2022       Microscopic Examination (no units)   Date Value   06/21/2020 YES         Lab Results   Component Value Date    WBC 11.5 03/29/2022    NEUTROABS 7.2 03/29/2022    HGB 14.4 03/29/2022    HCT 46.3 03/29/2022    MCV 88.0 03/29/2022     03/29/2022       Lab Results   Component Value Date    TSH 2.06 03/29/2022         ASSESSMENT/PLAN:     1. Cystitis without hematuria  Bactrim Bid for 10 days      2. Anxiety and depression  Stable on Prozac and Topamax    3. Major depressive disorder with single episode, in partial remission (HCC)  Stable and improved PHQ 9 score 12 today. 4. Positive depression screening  Improved 12 Today from 22 in March 2022    5. Right otitis media with effusion  - sulfamethoxazole-trimethoprim (BACTRIM DS;SEPTRA DS) 800-160 MG per tablet; Take 1 tablet by mouth 2 times daily for 10 days  Dispense: 20 tablet; Refill: 0        Orders Placed This Encounter   Medications    sulfamethoxazole-trimethoprim (BACTRIM DS;SEPTRA DS) 800-160 MG per tablet     Sig: Take 1 tablet by mouth 2 times daily for 10 days     Dispense:  20 tablet     Refill:  0        PHQ-9 score today: (PHQ-9 Total Score: 12), additional evaluation and assessment performed, follow-up plan includes but not limited to: Medication management and Referral to /Specialist  for evaluation and management.

## 2022-06-13 NOTE — PROGRESS NOTES
Chief Complaint   Patient presents with    Follow-up    Dysmenorrhea    Incontinence     Urinary       Have you seen any other physician or provider since your last visit yes - [place]    Have you had any other diagnostic tests since your last visit?  yes - labs    Have you changed or stopped any medications since your last visit? no

## 2022-06-21 ENCOUNTER — APPOINTMENT (OUTPATIENT)
Dept: CT IMAGING | Facility: HOSPITAL | Age: 27
End: 2022-06-21
Payer: MEDICAID

## 2022-06-21 ENCOUNTER — HOSPITAL ENCOUNTER (EMERGENCY)
Facility: HOSPITAL | Age: 27
Discharge: HOME | End: 2022-06-21
Attending: EMERGENCY MEDICINE
Payer: MEDICAID

## 2022-06-21 ENCOUNTER — OFFICE VISIT (OUTPATIENT)
Dept: PRIMARY CARE CLINIC | Age: 27
End: 2022-06-21
Payer: MEDICAID

## 2022-06-21 VITALS
TEMPERATURE: 98 F | HEIGHT: 65 IN | RESPIRATION RATE: 18 BRPM | BODY MASS INDEX: 48.82 KG/M2 | WEIGHT: 293 LBS | OXYGEN SATURATION: 98 % | HEART RATE: 83 BPM | SYSTOLIC BLOOD PRESSURE: 125 MMHG | DIASTOLIC BLOOD PRESSURE: 90 MMHG

## 2022-06-21 VITALS
HEIGHT: 65 IN | HEART RATE: 84 BPM | RESPIRATION RATE: 18 BRPM | OXYGEN SATURATION: 99 % | WEIGHT: 293 LBS | BODY MASS INDEX: 48.82 KG/M2 | SYSTOLIC BLOOD PRESSURE: 115 MMHG | DIASTOLIC BLOOD PRESSURE: 74 MMHG | TEMPERATURE: 98.4 F

## 2022-06-21 DIAGNOSIS — M54.9 BACK PAIN, UNSPECIFIED BACK LOCATION, UNSPECIFIED BACK PAIN LATERALITY, UNSPECIFIED CHRONICITY: Primary | ICD-10-CM

## 2022-06-21 DIAGNOSIS — R11.0 NAUSEA: ICD-10-CM

## 2022-06-21 DIAGNOSIS — S39.012A BACK STRAIN, INITIAL ENCOUNTER: Primary | ICD-10-CM

## 2022-06-21 LAB
ANION GAP SERPL CALCULATED.3IONS-SCNC: 11 MMOL/L (ref 3–16)
BACTERIA: ABNORMAL /HPF
BILIRUBIN URINE: ABNORMAL
BILIRUBIN, POC: NORMAL
BLOOD URINE, POC: NORMAL
BLOOD, URINE: ABNORMAL
BUN BLDV-MCNC: 12 MG/DL (ref 6–20)
CALCIUM SERPL-MCNC: 9.4 MG/DL (ref 8.5–10.5)
CHLORIDE BLD-SCNC: 102 MMOL/L (ref 98–107)
CLARITY, POC: NORMAL
CLARITY: ABNORMAL
CO2: 23 MMOL/L (ref 20–30)
COLOR, POC: YELLOW
COLOR: ABNORMAL
CREAT SERPL-MCNC: 0.6 MG/DL (ref 0.4–1.2)
EPITHELIAL CELLS, UA: ABNORMAL /HPF (ref 0–5)
GFR AFRICAN AMERICAN: >59
GFR NON-AFRICAN AMERICAN: >60
GLUCOSE BLD-MCNC: 104 MG/DL (ref 74–106)
GLUCOSE URINE, POC: NORMAL
GLUCOSE URINE: ABNORMAL MG/DL
HCG(URINE) PREGNANCY TEST: NEGATIVE
HCT VFR BLD CALC: 44.7 % (ref 37–47)
HEMOGLOBIN: 14 G/DL (ref 11.5–16.5)
KETONES, POC: NORMAL
KETONES, URINE: ABNORMAL MG/DL
LEUKOCYTE EST, POC: NORMAL
LEUKOCYTE ESTERASE, URINE: ABNORMAL
MCH RBC QN AUTO: 27.5 PG (ref 27–32)
MCHC RBC AUTO-ENTMCNC: 31.3 G/DL (ref 31–35)
MCV RBC AUTO: 87.6 FL (ref 80–100)
MICROSCOPIC EXAMINATION: YES
NITRITE, POC: NORMAL
NITRITE, URINE: ABNORMAL
PDW BLD-RTO: 13.2 % (ref 11–16)
PH UA: ABNORMAL (ref 5–8)
PH, POC: 8
PLATELET # BLD: 266 K/UL (ref 150–400)
PMV BLD AUTO: 11.9 FL (ref 6–10)
POTASSIUM SERPL-SCNC: 4 MMOL/L (ref 3.4–5.1)
PROTEIN UA: ABNORMAL MG/DL
PROTEIN, POC: NORMAL
RBC # BLD: 5.1 M/UL (ref 3.8–5.8)
RBC UA: >100 /HPF (ref 0–4)
SODIUM BLD-SCNC: 136 MMOL/L (ref 136–145)
SPECIFIC GRAVITY UA: ABNORMAL (ref 1–1.03)
SPECIFIC GRAVITY, POC: 1.01
URINE REFLEX TO CULTURE: ABNORMAL
URINE TYPE: ABNORMAL
UROBILINOGEN, POC: 0.2
UROBILINOGEN, URINE: ABNORMAL E.U./DL
WBC # BLD: 12.8 K/UL (ref 4–11)
WBC UA: ABNORMAL /HPF (ref 0–5)

## 2022-06-21 PROCEDURE — 6360000002 HC RX W HCPCS: Performed by: EMERGENCY MEDICINE

## 2022-06-21 PROCEDURE — 99213 OFFICE O/P EST LOW 20 MIN: CPT | Performed by: PHYSICIAN ASSISTANT

## 2022-06-21 PROCEDURE — 85027 COMPLETE CBC AUTOMATED: CPT

## 2022-06-21 PROCEDURE — G8427 DOCREV CUR MEDS BY ELIG CLIN: HCPCS | Performed by: PHYSICIAN ASSISTANT

## 2022-06-21 PROCEDURE — 84703 CHORIONIC GONADOTROPIN ASSAY: CPT

## 2022-06-21 PROCEDURE — 36415 COLL VENOUS BLD VENIPUNCTURE: CPT

## 2022-06-21 PROCEDURE — G8417 CALC BMI ABV UP PARAM F/U: HCPCS | Performed by: PHYSICIAN ASSISTANT

## 2022-06-21 PROCEDURE — 81002 URINALYSIS NONAUTO W/O SCOPE: CPT | Performed by: PHYSICIAN ASSISTANT

## 2022-06-21 PROCEDURE — 2580000003 HC RX 258: Performed by: EMERGENCY MEDICINE

## 2022-06-21 PROCEDURE — 99284 EMERGENCY DEPT VISIT MOD MDM: CPT

## 2022-06-21 PROCEDURE — 96374 THER/PROPH/DIAG INJ IV PUSH: CPT

## 2022-06-21 PROCEDURE — 4004F PT TOBACCO SCREEN RCVD TLK: CPT | Performed by: PHYSICIAN ASSISTANT

## 2022-06-21 PROCEDURE — 74176 CT ABD & PELVIS W/O CONTRAST: CPT

## 2022-06-21 PROCEDURE — 81001 URINALYSIS AUTO W/SCOPE: CPT

## 2022-06-21 PROCEDURE — 80048 BASIC METABOLIC PNL TOTAL CA: CPT

## 2022-06-21 RX ORDER — CYCLOBENZAPRINE HCL 10 MG
10 TABLET ORAL 3 TIMES DAILY PRN
Qty: 30 TABLET | Refills: 0 | Status: SHIPPED | OUTPATIENT
Start: 2022-06-21 | End: 2022-09-13

## 2022-06-21 RX ORDER — ONDANSETRON 4 MG/1
8 TABLET, FILM COATED ORAL EVERY 8 HOURS PRN
Qty: 15 TABLET | Refills: 0 | Status: SHIPPED | OUTPATIENT
Start: 2022-06-21 | End: 2022-11-03

## 2022-06-21 RX ORDER — 0.9 % SODIUM CHLORIDE 0.9 %
1000 INTRAVENOUS SOLUTION INTRAVENOUS ONCE
Status: COMPLETED | OUTPATIENT
Start: 2022-06-21 | End: 2022-06-21

## 2022-06-21 RX ORDER — HYDROCODONE BITARTRATE AND ACETAMINOPHEN 10; 325 MG/1; MG/1
1 TABLET ORAL EVERY 6 HOURS PRN
Qty: 12 TABLET | Refills: 0 | Status: SHIPPED | OUTPATIENT
Start: 2022-06-21 | End: 2022-06-24

## 2022-06-21 RX ORDER — CYCLOBENZAPRINE HCL 10 MG
10 TABLET ORAL 2 TIMES DAILY PRN
Qty: 10 TABLET | Refills: 0 | Status: SHIPPED | OUTPATIENT
Start: 2022-06-21 | End: 2022-06-21

## 2022-06-21 RX ORDER — KETOROLAC TROMETHAMINE 30 MG/ML
30 INJECTION, SOLUTION INTRAMUSCULAR; INTRAVENOUS ONCE
Status: COMPLETED | OUTPATIENT
Start: 2022-06-21 | End: 2022-06-21

## 2022-06-21 RX ADMIN — SODIUM CHLORIDE 1000 ML: 9 INJECTION, SOLUTION INTRAVENOUS at 22:10

## 2022-06-21 RX ADMIN — KETOROLAC TROMETHAMINE 30 MG: 30 INJECTION, SOLUTION INTRAMUSCULAR at 22:12

## 2022-06-21 ASSESSMENT — PAIN DESCRIPTION - LOCATION
LOCATION: BACK;FLANK
LOCATION: FLANK

## 2022-06-21 ASSESSMENT — PAIN DESCRIPTION - FREQUENCY: FREQUENCY: CONTINUOUS

## 2022-06-21 ASSESSMENT — PAIN - FUNCTIONAL ASSESSMENT: PAIN_FUNCTIONAL_ASSESSMENT: 0-10

## 2022-06-21 ASSESSMENT — PAIN DESCRIPTION - DESCRIPTORS: DESCRIPTORS: STABBING

## 2022-06-21 ASSESSMENT — PAIN DESCRIPTION - ORIENTATION: ORIENTATION: RIGHT

## 2022-06-21 ASSESSMENT — PAIN SCALES - GENERAL
PAINLEVEL_OUTOF10: 7
PAINLEVEL_OUTOF10: 9

## 2022-06-21 ASSESSMENT — ENCOUNTER SYMPTOMS
RESPIRATORY NEGATIVE: 1
GASTROINTESTINAL NEGATIVE: 1

## 2022-06-21 ASSESSMENT — PAIN DESCRIPTION - PAIN TYPE: TYPE: ACUTE PAIN

## 2022-06-21 ASSESSMENT — PAIN DESCRIPTION - DIRECTION: RADIATING_TOWARDS: RIGHT SHOULDER

## 2022-06-21 ASSESSMENT — PAIN DESCRIPTION - ONSET: ONSET: AWAKENED FROM SLEEP

## 2022-06-21 NOTE — PROGRESS NOTES
SUBJECTIVE:    Patient ID: Bettina Kerr is a 27 y. o.female. Chief Complaint   Patient presents with    Back Pain       HPI:    Pt here with c/o right LBP since 9 this AM. She states the pain is an 8/10. She describes the pain as aching and stabbing. She states it is constant but the intensity fluctuates. She took 800 IBU around 1 hour ago. She denies any pain like this previous. No alleviating factors. She states walking makes pain worse. She denies dysuria. She is currently on her menses. Denies chance of pregnancy. She states pain radiates to her shoulder. She states a few days ago she noticed her right hip was hurting. She states she sat down at work and the pain occurred. Denies any trauma. She has no h/o kidney stones. She endorses nausea but no vomiting. Patient's medications, allergies, past medical, surgical, social and family histories were reviewed and updated as appropriate in electronic medical record. Current Outpatient Medications on File Prior to Visit   Medication Sig Dispense Refill    sulfamethoxazole-trimethoprim (BACTRIM DS;SEPTRA DS) 800-160 MG per tablet Take 1 tablet by mouth 2 times daily for 10 days 20 tablet 0    fluticasone (FLONASE) 50 MCG/ACT nasal spray 1 spray by Each Nostril route daily 32 g 1    topiramate (TOPAMAX) 50 MG tablet Take one tablet daily 90 tablet 2    FLUoxetine (PROZAC) 20 MG capsule Take 1 capsule by mouth once daily 90 capsule 1     No current facility-administered medications on file prior to visit. Review of Systems   Constitutional: Negative. HENT: Negative. Respiratory: Negative. Cardiovascular: Negative. Gastrointestinal: Negative. Genitourinary: Positive for flank pain. Skin: Negative. Neurological: Negative. Psychiatric/Behavioral: Negative.         Past Medical History:   Diagnosis Date    Anemia     Anxiety     Borderline personality disorder ([facility] [address]     Depression     GERD (gastroesophageal reflux disease)     Headache     Hypothyroidism     Obesity     OCD (obsessive compulsive disorder)     PCOS (polycystic ovarian syndrome)     Trichotillomania      Past Surgical History:   Procedure Laterality Date    WISDOM TOOTH EXTRACTION       Family History   Problem Relation Age of Onset    Diabetes Mother     Depression Mother     High Blood Pressure Father     Alcohol Abuse Father     Stroke Father     Depression Sister     Depression Paternal Aunt     Cancer Paternal Aunt         lung    Diabetes Maternal Grandmother     Depression Maternal Grandmother     High Blood Pressure Maternal Grandmother     Diabetes Maternal Grandfather     High Blood Pressure Maternal Grandfather     Stroke Maternal Grandfather     Diabetes Paternal Grandmother     Cancer Paternal Grandfather         lung      Social History     Tobacco Use   Smoking Status Current Every Day Smoker    Packs/day: 1.00    Years: 2.00    Pack years: 2.00    Types: Cigarettes    Last attempt to quit: 12/2018    Years since quitting: 3.5   Smokeless Tobacco Never Used       OBJECTIVE:   Wt Readings from Last 3 Encounters:   06/21/22 (!) 354 lb (160.6 kg)   06/13/22 (!) 354 lb 10.4 oz (160.9 kg)   03/29/22 (!) 360 lb 6.4 oz (163.5 kg)     BP Readings from Last 3 Encounters:   06/21/22 (!) 125/90   06/13/22 127/83   06/07/22 122/83       BP (!) 125/90 (Site: Right Wrist, Position: Sitting, Cuff Size: Medium Adult)   Pulse 83   Temp 98 °F (36.7 °C) (Temporal)   Resp 18   Ht 5' 5\" (1.651 m)   Wt (!) 354 lb (160.6 kg)   SpO2 98%   BMI 58.91 kg/m²    Physical Exam  Vitals reviewed. Constitutional:       General: She is not in acute distress. Appearance: Normal appearance. She is normal weight. She is not ill-appearing or toxic-appearing. HENT:      Head: Normocephalic and atraumatic. Mouth/Throat:      Mouth: Mucous membranes are moist.      Pharynx: Oropharynx is clear.    Eyes:      Conjunctiva/sclera: Conjunctivae normal.      Pupils: Pupils are equal, round, and reactive to light. Cardiovascular:      Rate and Rhythm: Normal rate and regular rhythm. Heart sounds: Normal heart sounds. No murmur heard. No gallop. Pulmonary:      Effort: Pulmonary effort is normal.      Breath sounds: Normal breath sounds. No wheezing, rhonchi or rales. Abdominal:      General: Abdomen is flat. Bowel sounds are normal.      Palpations: Abdomen is soft. Tenderness: There is right CVA tenderness. There is no left CVA tenderness, guarding or rebound. Skin:     General: Skin is warm and dry. Findings: No lesion or rash. Neurological:      Mental Status: She is alert and oriented to person, place, and time. Psychiatric:         Mood and Affect: Mood normal.         Behavior: Behavior normal.         Thought Content: Thought content normal.         Judgment: Judgment normal.         Lab Results   Component Value Date     03/29/2022    K 4.4 03/29/2022     03/29/2022    CO2 25 03/29/2022    GLUCOSE 109 03/29/2022    BUN 10 03/29/2022    CREATININE 0.6 03/29/2022    CREATININE 0.6 03/21/2011    CALCIUM 9.2 03/29/2022    PROT 7.5 03/29/2022    LABALBU 3.8 03/29/2022    BILITOT 0.4 03/29/2022    ALT 15 03/29/2022    AST 17 03/29/2022     Microscopic Examination (no units)   Date Value   06/21/2020 YES       Lab Results   Component Value Date    WBC 11.5 03/29/2022    NEUTROABS 7.2 03/29/2022    HGB 14.4 03/29/2022    HCT 46.3 03/29/2022    MCV 88.0 03/29/2022     03/29/2022     Lab Results   Component Value Date    TSH 2.06 03/29/2022       ASSESSMENT/PLAN:     1. Back pain, unspecified back location, unspecified back pain laterality, unspecified chronicity  - she was recently tx for UTI sxs. Now she is having right flank pain with mild CVAT. No h/o stones, but will scan. She is currently on her menses so UA has blood.    - POCT Urinalysis no Micro  - CT ABDOMEN PELVIS WO CONTRAST Additional Contrast? None; Future  - cyclobenzaprine (FLEXERIL) 10 MG tablet; Take 1 tablet by mouth 2 times daily as needed for Muscle spasms  Dispense: 10 tablet; Refill: 0  - if sxs worsen, go to ER for evaluation and tx    2.  Nausea  - zofran     ddx - muscle spasm/strain vs stone     Orders Placed This Encounter   Medications    cyclobenzaprine (FLEXERIL) 10 MG tablet     Sig: Take 1 tablet by mouth 2 times daily as needed for Muscle spasms     Dispense:  10 tablet     Refill:  0    ondansetron (ZOFRAN) 4 MG tablet     Sig: Take 2 tablets by mouth every 8 hours as needed for Nausea or Vomiting     Dispense:  15 tablet     Refill:  0        Electronically signed by EZIO Martines on 6/21/2022 at 10:58 AM

## 2022-06-22 NOTE — ED NOTES
Pt verbalized understanding of DC and FU instructions , no questions at this time. Pt left ambulating wel to POV with Significant other.       Zeyad Correa RN  06/21/22 [id number]

## 2022-06-22 NOTE — ED PROVIDER NOTES
[address] [facility] [address]  eMERGENCY dEPARTMENT eNCOUnter      Pt Name: Jesus Ovalle  MRN: [id number]  [place]: 1995  Date ofevaluation: 2/94/0393  Provider: Margarito Ellison MD    [address] [place] [address]       Chief Complaint   Patient presents with    Back Pain    Flank Pain         HISTORY OF PRESENT ILLNESS  (Location/Symptom, Timing/Onset, Context/Setting, Quality, Duration, Modifying Factors, Severity.)   Jesus Ovalle is a 27 y.o. female who presents to the emergency department with the onset of right flank pain earlier today. Patient states the pain started radiating from underneath her right rib cage up to her right shoulder. Pain is progressively worsened today. She was diagnosed with a UTI on June 13. She was started on Bactrim. She was seen in the office today and had another UA done. She was not given the results. She was just told to come to the ER if she got worse. No fever. Nursing notes were reviewed. REVIEW OF SYSTEMS    (2-9systems for level 4, 10 or more for level 5)   ROS:  General:  No fevers, no chills, no weakness  HEENT: No sore throat, runny nose or ear pain  Cardiovascular:  No chest pain, no palpitations  Respiratory:  No shortness of breath, no cough, no wheezing  Gastrointestinal:  No pain, no nausea, no vomiting, no diarrhea  Musculoskeletal:  + right flank pain  Skin:  No rash, no easy bruising  Genitourinary:  No dysuria, no hematuria    Except as noted above theremainder of the review of systems was reviewed and negative.        PASTMEDICAL HISTORY     Past Medical History:   Diagnosis Date    Anemia     Anxiety     Borderline personality disorder ([address]     Depression     GERD (gastroesophageal reflux disease)     Headache     Hypothyroidism     Obesity     OCD (obsessive compulsive disorder)     PCOS (polycystic ovarian syndrome)     Trichotillomania          SURGICAL HISTORY       Past Surgical History:   Procedure Laterality Date    WISDOM TOOTH EXTRACTION           CURRENT MEDICATIONS       Previous Medications    FLUOXETINE (PROZAC) 20 MG CAPSULE    Take 1 capsule by mouth once daily    FLUTICASONE (FLONASE) 50 MCG/ACT NASAL SPRAY    1 spray by Each Nostril route daily    ONDANSETRON (ZOFRAN) 4 MG TABLET    Take 2 tablets by mouth every 8 hours as needed for Nausea or Vomiting    SULFAMETHOXAZOLE-TRIMETHOPRIM (BACTRIM DS;SEPTRA DS) 800-160 MG PER TABLET    Take 1 tablet by mouth 2 times daily for 10 days    TOPIRAMATE (TOPAMAX) 50 MG TABLET    Take one tablet daily       ALLERGIES     Bee venom, Sertraline, and Zoloft    FAMILY HISTORY       Family History   Problem Relation Age of Onset    Diabetes Mother     Depression Mother     High Blood Pressure Father     Alcohol Abuse Father     Stroke Father     Depression Sister     Depression Paternal Aunt     Cancer Paternal Aunt         lung    Diabetes Maternal Grandmother     Depression Maternal Grandmother     High Blood Pressure Maternal Grandmother     Diabetes Maternal Grandfather     High Blood Pressure Maternal Grandfather     Stroke Maternal Grandfather     Diabetes Paternal Grandmother     Cancer Paternal Grandfather         lung          SOCIAL HISTORY       Social History     Socioeconomic History    Marital status:      Spouse name: None    Number of children: None    Years of education: None    Highest education level: None   Occupational History    None   Tobacco Use    Smoking status: Current Every Day Smoker     Packs/day: 1.00     Years: 2.00     Pack years: 2.00     Types: Cigarettes     Last attempt to quit: 12/2018     Years since quitting: 3.5    Smokeless tobacco: Never Used   Vaping Use    Vaping Use: Former    Substances: Nicotine, Flavoring    Devices: Refillable tank    Passive vaping exposure: Yes   Substance and Sexual Activity    Alcohol use: Yes     Comment: occ    Drug use: Not Currently    Sexual activity: Yes     Partners: Male   Other Topics Concern    None   Social History Narrative    None     Social Determinants of Health     Financial Resource Strain: Low Risk     Difficulty of Paying Living Expenses: Not hard at all   Food Insecurity: No Food Insecurity    Worried About Running Out of Food in the Last Year: Never true    [address] in the Last Year: Never true   Transportation Needs:     Lack of Transportation (Medical): Not on file    Lack of Transportation (Non-Medical): Not on file   Physical Activity: Unknown    Days of Exercise per Week: 0 days    Minutes of Exercise per Session: Not on file   Stress:     Feeling of Stress : Not on file   Social Connections:     Frequency of Communication with Friends and Family: Not on file    Frequency of Social Gatherings with Friends and Family: Not on file    Attends [employer] Services: Not on file    Active Member of [address] [place] [address] [employer] or Organizations: Not on file    Attends Club or Organization Meetings: Not on file    Marital Status: Not on file   Intimate Partner Violence: Not At Risk    Fear of Current or Ex-Partner: No    Emotionally Abused: No    Physically Abused: No    Sexually Abused: No   Housing Stability:     Unable to Pay for Housing in the Last Year: Not on file    Number of [place] in the Last Year: Not on file    Unstable Housing in the Last Year: Not on file         PHYSICAL EXAM    (up to 7 forlevel 4, 8 or more for level 5)     ED Triage Vitals [06/21/22 2040]   BP Temp Temp Source Heart Rate Resp SpO2 Height Weight   96/82 98.4 °F (36.9 °C) Oral (!) 110 20 97 % 5' 5\" (1.651 m) (!) 360 lb (163.3 kg)       Physical Exam  General :Patient is awake, alert, oriented, in no acute distress, nontoxic appearing  HEENT: Pupils are equally round and reactive to light, EOMI. Cardiac: Heart regular rate, rhythm, no murmurs, rubs, or gallops  Lungs: Lungs are clear to auscultation, there is no wheezing, rhonchi, or rales.    Abdomen: Abdomen is soft, nontender, nondistended. Musculoskeletal: Ambulatory  Back: Right CVA tenderness to palpation that extends to posterior lower back  Dermatology: Skin is warm and dry  Psych: Mentation is grossly normal, cognition is grossly normal. Affect is appropriate. DIAGNOSTIC RESULTS       RADIOLOGY:   Non-plain film images such as CT, Ultrasoundand MRI are read by the radiologist. Plain radiographic images are visualized and preliminarily interpreted by the emergency physician with the below findings:      [x] Radiologist's Report Reviewed:  CT ABDOMEN PELVIS WO CONTRAST Additional Contrast? None   Final Result   1. No evidence of any acute abdominal or pelvic process. 2. Endometrium is thickened. Correlate for any dysfunctional uterine bleeding. This could be further characterized with pelvic ultrasound.              ED BEDSIDE ULTRASOUND:   Performed by ED Physician - none    LABS:  Labs Reviewed   URINALYSIS WITH REFLEX TO CULTURE - Abnormal; Notable for the following components:       Result Value    Color, UA RED (*)     Clarity, UA SL CLOUDY (*)     Glucose, Ur Color Interfer (*)     Bilirubin Urine Color Interfer (*)     Ketones, Urine Color Interfer (*)     Blood, Urine Color Interfer (*)     pH, UA Color Interfer (*)     Protein, UA Color Interfer (*)     Urobilinogen, Urine Color Interfer (*)     Nitrite, Urine Color Interfer (*)     Leukocyte Esterase, Urine Color Interfer (*)     All other components within normal limits   CBC - Abnormal; Notable for the following components:    WBC 12.8 (*)     MPV 11.9 (*)     All other components within normal limits   MICROSCOPIC URINALYSIS - Abnormal; Notable for the following components:    RBC, UA >100 (*)     Epithelial Cells, UA 11-20 (*)     Bacteria, UA Rare (*)     All other components within normal limits   PREGNANCY, URINE   BASIC METABOLIC PANEL       I have reviewed and interpreted all of the currently available lab resultsfrom this visit (if applicable):  Results for orders placed or performed during the hospital encounter of 06/21/22   Urinalysis with Reflex to Culture    Specimen: Urine   Result Value Ref Range    Color, UA RED (A) Straw/Yellow    Clarity, UA SL CLOUDY (A) Clear    Glucose, Ur Color Interfer (A) Negative mg/dL    Bilirubin Urine Color Interfer (A) Negative    Ketones, Urine Color Interfer (A) Negative mg/dL    Specific Gravity, UA Color Interfer 1.005 - 1.030    Blood, Urine Color Interfer (A) Negative    pH, UA Color Interfer (A) 5.0 - 8.0    Protein, UA Color Interfer (A) Negative mg/dL    Urobilinogen, Urine Color Interfer (A) <2.0 E.U./dL    Nitrite, Urine Color Interfer (A) Negative    Leukocyte Esterase, Urine Color Interfer (A) Negative    Microscopic Examination YES     Urine Type Cleancatch     Urine Reflex to Culture Not Indicated    Pregnancy, Urine   Result Value Ref Range    HCG(Urine) Pregnancy Test Negative Detects HCG level >20 MIU/mL   CBC   Result Value Ref Range    WBC 12.8 (H) 4.0 - 11.0 K/uL    RBC 5.10 3.80 - 5.80 M/uL    Hemoglobin 14.0 11.5 - 16.5 g/dL    Hematocrit 44.7 37.0 - 47.0 %    MCV 87.6 80.0 - 100.0 fL    MCH 27.5 27.0 - 32.0 pg    MCHC 31.3 31.0 - 35.0 g/dL    RDW 13.2 11.0 - 16.0 %    Platelets 717 773 - 743 K/uL    MPV 11.9 (H) 6.0 - 10.0 fL   Basic Metabolic Panel   Result Value Ref Range    Sodium 136 136 - 145 mmol/L    Potassium 4.0 3.4 - 5.1 mmol/L    Chloride 102 98 - 107 mmol/L    CO2 23 20 - 30 mmol/L    Anion Gap 11 3 - 16    Glucose 104 74 - 106 mg/dL    BUN 12 6 - 20 mg/dL    CREATININE 0.6 0.4 - 1.2 mg/dL    GFR Non-African American >60 >59    GFR African American >59 >59    Calcium 9.4 8.5 - 10.5 mg/dL   Microscopic Urinalysis   Result Value Ref Range    WBC, UA 3-5 0 - 5 /HPF    RBC, UA >100 (A) 0 - 4 /HPF    Epithelial Cells, UA 11-20 (A) 0 - 5 /HPF    Bacteria, UA Rare (A) None Seen /HPF        All other labs were within normal range or not returned as of this dictation. EMERGENCY DEPARTMENT COURSE and DIFFERENTIAL DIAGNOSIS/MDM:   Vitals:    Vitals:    06/21/22 2040   BP: 96/82   Pulse: (!) 110   Resp: 20   Temp: 98.4 °F (36.9 °C)   TempSrc: Oral   SpO2: 97%   Weight: (!) 360 lb (163.3 kg)   Height: 5' 5\" (1.651 m)       UA with color interference but microscopic shows greater than 100 RBCs. No WBCs. She is on her menses. Urine pregnancy negative    CBC normal  BMP normal    CT negative    More than likely this is musculoskeletal pain. Will prescribe muscle relaxers. The patient will follow-up with their PCP in 1-2 days for reevaluation. If the patient or family members have any further concerns or any worsening symptoms they will return to the ED for reevaluation. CONSULTS:  None    PROCEDURES:  Procedures    CRITICAL CARE TIME    Total Critical Care time was 0 minutes, excluding separately reportable procedures. There was a high probability of clinically significant/life threatening deterioration in the patient's condition which required my urgent intervention. FINAL IMPRESSION      1. Back strain, initial encounter          DISPOSITION/PLAN   DISPOSITION        PATIENT REFERRED TO:  Sharifa Gupta PA-C  [address] [facility]  [phone]    Schedule an appointment as soon as possible for a visit in 2 days  As needed      DISCHARGE MEDICATIONS:  New Prescriptions    CYCLOBENZAPRINE (FLEXERIL) 10 MG TABLET    Take 1 tablet by mouth 3 times daily as needed for Muscle spasms    HYDROCODONE-ACETAMINOPHEN (NORCO)  MG PER TABLET    Take 1 tablet by mouth every 6 hours as needed for Pain for up to 3 days. Comment: Please note this report has been produced using speech recognition software and may contain errors related tothat system including errors in grammar, punctuation, and spelling, as well as words and phrases that may be inappropriate.  If there are any questions or concerns please feel free to contact the dictating provider forclarification.     Demetrius Malagon MD  Attending Emergency Physician                  Demetrius Malagon MD  06/21/22 3037

## 2022-06-27 ENCOUNTER — HOSPITAL ENCOUNTER (OUTPATIENT)
Dept: GENERAL RADIOLOGY | Facility: HOSPITAL | Age: 27
Discharge: HOME | End: 2022-06-27
Payer: MEDICAID

## 2022-06-27 ENCOUNTER — OFFICE VISIT (OUTPATIENT)
Dept: PRIMARY CARE CLINIC | Age: 27
End: 2022-06-27
Payer: MEDICAID

## 2022-06-27 ENCOUNTER — HOSPITAL ENCOUNTER (OUTPATIENT)
Facility: HOSPITAL | Age: 27
Discharge: HOME | End: 2022-06-27
Payer: MEDICAID

## 2022-06-27 VITALS
OXYGEN SATURATION: 97 % | HEIGHT: 65 IN | SYSTOLIC BLOOD PRESSURE: 120 MMHG | HEART RATE: 80 BPM | WEIGHT: 293 LBS | DIASTOLIC BLOOD PRESSURE: 84 MMHG | BODY MASS INDEX: 48.82 KG/M2

## 2022-06-27 DIAGNOSIS — S39.012A BACK STRAIN, INITIAL ENCOUNTER: ICD-10-CM

## 2022-06-27 DIAGNOSIS — S39.012A BACK STRAIN, INITIAL ENCOUNTER: Primary | ICD-10-CM

## 2022-06-27 PROCEDURE — G8417 CALC BMI ABV UP PARAM F/U: HCPCS | Performed by: PHYSICIAN ASSISTANT

## 2022-06-27 PROCEDURE — 4004F PT TOBACCO SCREEN RCVD TLK: CPT | Performed by: PHYSICIAN ASSISTANT

## 2022-06-27 PROCEDURE — G8427 DOCREV CUR MEDS BY ELIG CLIN: HCPCS | Performed by: PHYSICIAN ASSISTANT

## 2022-06-27 PROCEDURE — 99213 OFFICE O/P EST LOW 20 MIN: CPT | Performed by: PHYSICIAN ASSISTANT

## 2022-06-27 PROCEDURE — 72070 X-RAY EXAM THORAC SPINE 2VWS: CPT

## 2022-06-27 RX ORDER — MELOXICAM 15 MG/1
15 TABLET ORAL DAILY
Qty: 30 TABLET | Refills: 3 | Status: SHIPPED | OUTPATIENT
Start: 2022-06-27 | End: 2022-09-13

## 2022-06-27 RX ORDER — METHYLPREDNISOLONE 4 MG/1
TABLET ORAL
Qty: 1 KIT | Refills: 0 | Status: SHIPPED | OUTPATIENT
Start: 2022-06-27 | End: 2022-07-03

## 2022-06-27 ASSESSMENT — ENCOUNTER SYMPTOMS
COUGH: 0
BACK PAIN: 1
SHORTNESS OF BREATH: 0
CONSTIPATION: 0
DIARRHEA: 0
EYE PAIN: 0
ABDOMINAL PAIN: 0
SORE THROAT: 0

## 2022-06-27 NOTE — PROGRESS NOTES
Chief Complaint   Patient presents with    Back Pain     X 1 week     Have you seen another provider for this condition recently- Yes, Abbey     Have you had any recent diagnostic testing for this condition- No    Do you currently take any medications for this condition- No

## 2022-06-27 NOTE — PROGRESS NOTES
Nav Cano (:  1995) is a 27 y.o. female,Established patient, here for evaluation of the following chief complaint(s):  Back Pain           Subjective   SUBJECTIVE/OBJECTIVE:  HPI   Ms. Payton Rios is a 27year old female who presents with continued mid back pain on right side that has now moved to the center. She has been taking Flexeril with mild relief. She presented to the ER and renal stones were ruled out  With CT imaging. She was told repeat UA was normal.  She denies any further urinary symptoms. She denies any injury to her back. She has recently started a new job and states that she does have back pain throughout the day. She has been taking an over-the-counter headache medication to alleviate the pain. She states at times it is 8 out of 10 on a pain scale. She describes it as aching and stabbing. Review of Systems   Constitutional: Negative for chills, fatigue and fever. HENT: Negative for congestion, ear pain, nosebleeds and sore throat. Eyes: Negative for pain and visual disturbance. Respiratory: Negative for cough and shortness of breath. Cardiovascular: Negative for chest pain and palpitations. Gastrointestinal: Negative for abdominal pain, constipation and diarrhea. Genitourinary: Negative for difficulty urinating and flank pain. Musculoskeletal: Positive for back pain. Negative for arthralgias, gait problem and myalgias. Skin: Negative for rash. Neurological: Negative for syncope, light-headedness and headaches. Psychiatric/Behavioral: Negative for behavioral problems, confusion and dysphoric mood. Objective     Vitals:    22 1635   BP: 120/84   Site: Left Lower Arm   Position: Sitting   Pulse: 80   SpO2: 97%   Weight: (!) 358 lb 9.6 oz (162.7 kg)   Height: 5' 5\" (1.651 m)     Physical Exam  Vitals reviewed. Constitutional:       Appearance: Normal appearance. HENT:      Head: Normocephalic and atraumatic.       Right Ear: External ear normal.      Left Ear: External ear normal.      Nose: Nose normal.      Mouth/Throat:      Mouth: Mucous membranes are moist.      Pharynx: Oropharynx is clear. Eyes:      Extraocular Movements: Extraocular movements intact. Conjunctiva/sclera: Conjunctivae normal.      Pupils: Pupils are equal, round, and reactive to light. Cardiovascular:      Rate and Rhythm: Normal rate and regular rhythm. Pulses: Normal pulses. Heart sounds: Normal heart sounds. Pulmonary:      Effort: Pulmonary effort is normal.      Breath sounds: Normal breath sounds. Abdominal:      General: Bowel sounds are normal.      Palpations: Abdomen is soft. Musculoskeletal:      Cervical back: Normal range of motion. Thoracic back: Tenderness (paraspinal) and bony tenderness present. Decreased range of motion. Skin:     General: Skin is warm. Findings: No rash. Neurological:      General: No focal deficit present. Mental Status: She is alert and oriented to person, place, and time. Psychiatric:         Mood and Affect: Mood normal.         Thought Content: Thought content normal.                 ASSESSMENT/PLAN:  1. Back strain, initial encounter  Have advised moist heat and Epson salt baths. He is to continue her Flexeril nightly. I have added Mobic once a day with food and advised her to discontinue all over-the-counter NSAIDs and to use Tylenol as needed . We will obtain x-rays and I have advised her to start her steroid Dosepak in the morning and on overall possible side effects of medication.  - meloxicam (MOBIC) 15 MG tablet; Take 1 tablet by mouth daily  Dispense: 30 tablet; Refill: 3  - XR THORACIC SPINE (2 VIEWS); Future  - methylPREDNISolone (MEDROL DOSEPACK) 4 MG tablet; Take by mouth. Dispense: 1 kit; Refill: 0        Return if symptoms worsen or fail to improve. An electronic signature was used to authenticate this note.     --Brett Mcadams PA-C

## 2022-07-13 ENCOUNTER — OFFICE VISIT (OUTPATIENT)
Dept: OBSTETRICS AND GYNECOLOGY | Facility: CLINIC | Age: 27
End: 2022-07-13

## 2022-07-13 ENCOUNTER — PRE-ADMISSION TESTING (OUTPATIENT)
Dept: PREADMISSION TESTING | Facility: HOSPITAL | Age: 27
End: 2022-07-13

## 2022-07-13 VITALS — BODY MASS INDEX: 59.94 KG/M2 | DIASTOLIC BLOOD PRESSURE: 80 MMHG | SYSTOLIC BLOOD PRESSURE: 112 MMHG | WEIGHT: 293 LBS

## 2022-07-13 VITALS — WEIGHT: 293 LBS | BODY MASS INDEX: 48.82 KG/M2 | HEIGHT: 65 IN

## 2022-07-13 DIAGNOSIS — N93.9 ABNORMAL UTERINE BLEEDING (AUB): Primary | ICD-10-CM

## 2022-07-13 DIAGNOSIS — N93.9 ABNORMAL UTERINE BLEEDING (AUB): ICD-10-CM

## 2022-07-13 LAB
B-HCG UR QL: NEGATIVE
BACTERIA UR QL AUTO: ABNORMAL /HPF
BASOPHILS # BLD AUTO: 0.13 10*3/MM3 (ref 0–0.2)
BASOPHILS NFR BLD AUTO: 0.8 % (ref 0–1.5)
BILIRUB UR QL STRIP: NEGATIVE
CLARITY UR: ABNORMAL
COLOR UR: ABNORMAL
DEPRECATED RDW RBC AUTO: 41.9 FL (ref 37–54)
EOSINOPHIL # BLD AUTO: 0.41 10*3/MM3 (ref 0–0.4)
EOSINOPHIL NFR BLD AUTO: 2.5 % (ref 0.3–6.2)
ERYTHROCYTE [DISTWIDTH] IN BLOOD BY AUTOMATED COUNT: 13.2 % (ref 12.3–15.4)
GLUCOSE UR STRIP-MCNC: NEGATIVE MG/DL
HCT VFR BLD AUTO: 42.5 % (ref 34–46.6)
HGB BLD-MCNC: 13.7 G/DL (ref 12–15.9)
HGB UR QL STRIP.AUTO: ABNORMAL
HYALINE CASTS UR QL AUTO: ABNORMAL /LPF
IMM GRANULOCYTES # BLD AUTO: 0.06 10*3/MM3 (ref 0–0.05)
IMM GRANULOCYTES NFR BLD AUTO: 0.4 % (ref 0–0.5)
KETONES UR QL STRIP: ABNORMAL
LEUKOCYTE ESTERASE UR QL STRIP.AUTO: ABNORMAL
LYMPHOCYTES # BLD AUTO: 3.91 10*3/MM3 (ref 0.7–3.1)
LYMPHOCYTES NFR BLD AUTO: 24 % (ref 19.6–45.3)
MCH RBC QN AUTO: 28.1 PG (ref 26.6–33)
MCHC RBC AUTO-ENTMCNC: 32.2 G/DL (ref 31.5–35.7)
MCV RBC AUTO: 87.3 FL (ref 79–97)
MONOCYTES # BLD AUTO: 0.97 10*3/MM3 (ref 0.1–0.9)
MONOCYTES NFR BLD AUTO: 6 % (ref 5–12)
NEUTROPHILS NFR BLD AUTO: 10.81 10*3/MM3 (ref 1.7–7)
NEUTROPHILS NFR BLD AUTO: 66.3 % (ref 42.7–76)
NITRITE UR QL STRIP: NEGATIVE
NRBC BLD AUTO-RTO: 0 /100 WBC (ref 0–0.2)
PH UR STRIP.AUTO: 8 [PH] (ref 5–8)
PLATELET # BLD AUTO: 305 10*3/MM3 (ref 140–450)
PMV BLD AUTO: 11.7 FL (ref 6–12)
PROT UR QL STRIP: ABNORMAL
RBC # BLD AUTO: 4.87 10*6/MM3 (ref 3.77–5.28)
RBC # UR STRIP: ABNORMAL /HPF
REF LAB TEST METHOD: ABNORMAL
SP GR UR STRIP: 1.02 (ref 1–1.03)
SQUAMOUS #/AREA URNS HPF: ABNORMAL /HPF
UROBILINOGEN UR QL STRIP: ABNORMAL
WBC # UR STRIP: ABNORMAL /HPF
WBC NRBC COR # BLD: 16.29 10*3/MM3 (ref 3.4–10.8)

## 2022-07-13 PROCEDURE — 99213 OFFICE O/P EST LOW 20 MIN: CPT | Performed by: OBSTETRICS & GYNECOLOGY

## 2022-07-13 PROCEDURE — 81001 URINALYSIS AUTO W/SCOPE: CPT

## 2022-07-13 PROCEDURE — 85025 COMPLETE CBC W/AUTO DIFF WBC: CPT

## 2022-07-13 PROCEDURE — 87086 URINE CULTURE/COLONY COUNT: CPT

## 2022-07-13 PROCEDURE — 81025 URINE PREGNANCY TEST: CPT

## 2022-07-13 PROCEDURE — 36415 COLL VENOUS BLD VENIPUNCTURE: CPT

## 2022-07-13 RX ORDER — LEVONORGESTREL 52 MG/1
1 INTRAUTERINE DEVICE INTRAUTERINE
COMMUNITY

## 2022-07-13 NOTE — H&P (VIEW-ONLY)
Subjective   Chief Complaint   Patient presents with   • Follow-up     Bleeding for 1 Month. TVS done today     Gracie Clemente is a 27 y.o. year old .  No LMP recorded.  She presents to be seen because of 1 month of bleeding.  Patient had D&C for similar issues in the fall.  Pathology was benign but did note a polyp since the D&C in October she has maybe had 2 periods though cannot remember exactly.  Has had D&C for similar circumstances in .  She had no long-term management after these D&Cs.  Pathology again was all benign..     OTHER COMPLAINTS:  Nothing else    The following portions of the patient's history were reviewed and updated as appropriate:  She  has a past medical history of Abnormal uterine bleeding (AUB), Amenorrhea, secondary, Anemia, Anxiety and depression, Body piercing (2021), Gastroesophageal reflux disease, Headache, History of bronchitis (2021), History of suicidal ideation (), Low back pain, Morbid obesity with BMI of 50.0-59.9, adult (HCC), Nightmares, Obesity, Rectal bleeding, Recurrent major depressive disorder (HCC), Seasonal allergies (2021), Tattoos (2021), Ulnar neuropathy at elbow, and Visual disturbance (2021).  She does not have any pertinent problems on file.  She  has a past surgical history that includes [place] tooth extraction; Colonoscopy (N/A, 2019); d & c hysteroscopy (N/A, 2020); and d & c hysteroscopy (N/A, 10/5/2021).  Her family history includes Arthritis in her father and mother; Breast cancer in her paternal aunt; COPD in her father; Cancer in her paternal aunt; Depression in her father, maternal aunt, and mother; Diabetes in her maternal aunt, mother, and another family member; Heart attack in her father; Heart disease in her father; Hyperlipidemia in her father and mother; Hypertension in her father, maternal aunt, and mother; Mental illness in her father, maternal aunt, mother, sister, and sister; Obesity  in her father, maternal aunt, and mother; Stroke in her father.  She  reports that she has been smoking cigarettes. She has a 4.00 pack-year smoking history. She has never used smokeless tobacco. She reports current alcohol use. She reports current drug use. Frequency: 1.00 time per week. Drug: Marijuana.  Current Outpatient Medications   Medication Sig Dispense Refill   • FLUoxetine (PROzac) 20 MG capsule TAKE ONE CAPSULE BY MOUTH EVERY DAY (Patient taking differently: Take 20 mg by mouth Daily.) 30 capsule 5   • ibuprofen (ADVIL,MOTRIN) 800 MG tablet Take 1 tablet by mouth Every 8 (Eight) Hours As Needed for Mild Pain . 30 tablet 0   • topiramate (TOPAMAX) 50 MG tablet Take 50 mg by mouth Daily.     • triamcinolone (KENALOG) 0.1 % cream Apply  topically 2 (Two) Times a Day. (Patient taking differently: Apply 1 application topically to the appropriate area as directed 2 (Two) Times a Day.) 45 g 6     No current facility-administered medications for this visit.     Current Outpatient Medications on File Prior to Visit   Medication Sig   • FLUoxetine (PROzac) 20 MG capsule TAKE ONE CAPSULE BY MOUTH EVERY DAY (Patient taking differently: Take 20 mg by mouth Daily.)   • ibuprofen (ADVIL,MOTRIN) 800 MG tablet Take 1 tablet by mouth Every 8 (Eight) Hours As Needed for Mild Pain .   • topiramate (TOPAMAX) 50 MG tablet Take 50 mg by mouth Daily.   • triamcinolone (KENALOG) 0.1 % cream Apply  topically 2 (Two) Times a Day. (Patient taking differently: Apply 1 application topically to the appropriate area as directed 2 (Two) Times a Day.)     No current facility-administered medications on file prior to visit.     She is allergic to bee venom and sertraline hcl.    Social History    Tobacco Use      Smoking status: Current Every Day Smoker        Packs/day: 1.00        Years: 4.00        Pack years: 4        Types: Cigarettes      Smokeless tobacco: Never Used    Review of Systems  Consitutional POS: nothing reported    NEG:  anorexia or night sweats   Gastointestinal POS: nothing reported    NEG: bloating, change in bowel habits, melena or reflux symptoms   Genitourinary POS: nothing reported    NEG: dysuria or hematuria   Integument POS: nothing reported    NEG: moles that are changing in size, shape, color or rashes   Breast POS: nothing reported    NEG: persistent breast lump, skin dimpling or nipple discharge         Respiratory: negative  Cardiovascular: negative          Objective   /80   Wt (!) 163 kg (360 lb 3.2 oz)   Breastfeeding No   BMI 59.94 kg/m²     General:  well developed; well nourished  no acute distress  obese - Body mass index is 59.94 kg/m².   Skin:  No suspicious lesions seen   Thyroid: normal to inspection and palpation   Lungs:  breathing is unlabored  clear to auscultation bilaterally   Heart:  Not performed.   Breasts:  Not performed.   Abdomen: soft, non-tender; no masses  no umbilical or inguinal hernias are present  no hepato-splenomegaly   Pelvis: Not performed.     Psychiatric: Alert and oriented ×3, mood and affect appropriate  HEENT: Atraumatic, normocephalic, normal scleral icterus  Extremities: 2+ pulses bilaterally, no edema      Lab Review   CBC    Imaging   Pelvic ultrasound images independantly reviewed - Uterus is anteverted normal size and shape.  Endometrium thickened heterogeneous 24.4 mm.  Hemorrhagic cyst in the left ovary 2.2 cm.  Normal right ovary.  No masses.        Assessment   1. Menorrhagia with very thickened endometrial lining     Plan   1. Hysteroscopy, dilatation curettage, Mirena IUD insertion  2. R/B A    No orders of the defined types were placed in this encounter.         This note was electronically signed.      July 13, 2022

## 2022-07-13 NOTE — PROGRESS NOTES
Subjective   Chief Complaint   Patient presents with   • Follow-up     Bleeding for 1 Month. TVS done today     Gracie Clemente is a 27 y.o. year old .  No LMP recorded.  She presents to be seen because of 1 month of bleeding.  Patient had D&C for similar issues in the fall.  Pathology was benign but did note a polyp since the D&C in October she has maybe had 2 periods though cannot remember exactly.  Has had D&C for similar circumstances in .  She had no long-term management after these D&Cs.  Pathology again was all benign..     OTHER COMPLAINTS:  Nothing else    The following portions of the patient's history were reviewed and updated as appropriate:  She  has a past medical history of Abnormal uterine bleeding (AUB), Amenorrhea, secondary, Anemia, Anxiety and depression, Body piercing (2021), Gastroesophageal reflux disease, Headache, History of bronchitis (2021), History of suicidal ideation (), Low back pain, Morbid obesity with BMI of 50.0-59.9, adult (HCC), Nightmares, Obesity, Rectal bleeding, Recurrent major depressive disorder (HCC), Seasonal allergies (2021), Tattoos (2021), Ulnar neuropathy at elbow, and Visual disturbance (2021).  She does not have any pertinent problems on file.  She  has a past surgical history that includes [place] tooth extraction; Colonoscopy (N/A, 2019); d & c hysteroscopy (N/A, 2020); and d & c hysteroscopy (N/A, 10/5/2021).  Her family history includes Arthritis in her father and mother; Breast cancer in her paternal aunt; COPD in her father; Cancer in her paternal aunt; Depression in her father, maternal aunt, and mother; Diabetes in her maternal aunt, mother, and another family member; Heart attack in her father; Heart disease in her father; Hyperlipidemia in her father and mother; Hypertension in her father, maternal aunt, and mother; Mental illness in her father, maternal aunt, mother, sister, and sister; Obesity  in her father, maternal aunt, and mother; Stroke in her father.  She  reports that she has been smoking cigarettes. She has a 4.00 pack-year smoking history. She has never used smokeless tobacco. She reports current alcohol use. She reports current drug use. Frequency: 1.00 time per week. Drug: Marijuana.  Current Outpatient Medications   Medication Sig Dispense Refill   • FLUoxetine (PROzac) 20 MG capsule TAKE ONE CAPSULE BY MOUTH EVERY DAY (Patient taking differently: Take 20 mg by mouth Daily.) 30 capsule 5   • ibuprofen (ADVIL,MOTRIN) 800 MG tablet Take 1 tablet by mouth Every 8 (Eight) Hours As Needed for Mild Pain . 30 tablet 0   • topiramate (TOPAMAX) 50 MG tablet Take 50 mg by mouth Daily.     • triamcinolone (KENALOG) 0.1 % cream Apply  topically 2 (Two) Times a Day. (Patient taking differently: Apply 1 application topically to the appropriate area as directed 2 (Two) Times a Day.) 45 g 6     No current facility-administered medications for this visit.     Current Outpatient Medications on File Prior to Visit   Medication Sig   • FLUoxetine (PROzac) 20 MG capsule TAKE ONE CAPSULE BY MOUTH EVERY DAY (Patient taking differently: Take 20 mg by mouth Daily.)   • ibuprofen (ADVIL,MOTRIN) 800 MG tablet Take 1 tablet by mouth Every 8 (Eight) Hours As Needed for Mild Pain .   • topiramate (TOPAMAX) 50 MG tablet Take 50 mg by mouth Daily.   • triamcinolone (KENALOG) 0.1 % cream Apply  topically 2 (Two) Times a Day. (Patient taking differently: Apply 1 application topically to the appropriate area as directed 2 (Two) Times a Day.)     No current facility-administered medications on file prior to visit.     She is allergic to bee venom and sertraline hcl.    Social History    Tobacco Use      Smoking status: Current Every Day Smoker        Packs/day: 1.00        Years: 4.00        Pack years: 4        Types: Cigarettes      Smokeless tobacco: Never Used    Review of Systems  Consitutional POS: nothing reported    NEG:  anorexia or night sweats   Gastointestinal POS: nothing reported    NEG: bloating, change in bowel habits, melena or reflux symptoms   Genitourinary POS: nothing reported    NEG: dysuria or hematuria   Integument POS: nothing reported    NEG: moles that are changing in size, shape, color or rashes   Breast POS: nothing reported    NEG: persistent breast lump, skin dimpling or nipple discharge         Respiratory: negative  Cardiovascular: negative          Objective   /80   Wt (!) 163 kg (360 lb 3.2 oz)   Breastfeeding No   BMI 59.94 kg/m²     General:  well developed; well nourished  no acute distress  obese - Body mass index is 59.94 kg/m².   Skin:  No suspicious lesions seen   Thyroid: normal to inspection and palpation   Lungs:  breathing is unlabored  clear to auscultation bilaterally   Heart:  Not performed.   Breasts:  Not performed.   Abdomen: soft, non-tender; no masses  no umbilical or inguinal hernias are present  no hepato-splenomegaly   Pelvis: Not performed.     Psychiatric: Alert and oriented ×3, mood and affect appropriate  HEENT: Atraumatic, normocephalic, normal scleral icterus  Extremities: 2+ pulses bilaterally, no edema      Lab Review   CBC    Imaging   Pelvic ultrasound images independantly reviewed - Uterus is anteverted normal size and shape.  Endometrium thickened heterogeneous 24.4 mm.  Hemorrhagic cyst in the left ovary 2.2 cm.  Normal right ovary.  No masses.        Assessment   1. Menorrhagia with very thickened endometrial lining     Plan   1. Hysteroscopy, dilatation curettage, Mirena IUD insertion  2. R/B A    No orders of the defined types were placed in this encounter.         This note was electronically signed.      July 13, 2022

## 2022-07-14 LAB — BACTERIA SPEC AEROBE CULT: NORMAL

## 2022-07-15 ENCOUNTER — ANESTHESIA (OUTPATIENT)
Dept: PERIOP | Facility: HOSPITAL | Age: 27
End: 2022-07-15

## 2022-07-15 ENCOUNTER — ANESTHESIA EVENT (OUTPATIENT)
Dept: PERIOP | Facility: HOSPITAL | Age: 27
End: 2022-07-15

## 2022-07-15 ENCOUNTER — HOSPITAL ENCOUNTER (OUTPATIENT)
Facility: HOSPITAL | Age: 27
Setting detail: HOSPITAL OUTPATIENT SURGERY
Discharge: HOME | End: 2022-07-15
Attending: OBSTETRICS & GYNECOLOGY | Admitting: OBSTETRICS & GYNECOLOGY

## 2022-07-15 VITALS
SYSTOLIC BLOOD PRESSURE: 159 MMHG | HEART RATE: 80 BPM | RESPIRATION RATE: 18 BRPM | DIASTOLIC BLOOD PRESSURE: 87 MMHG | OXYGEN SATURATION: 97 % | TEMPERATURE: 97 F

## 2022-07-15 DIAGNOSIS — N93.9 ABNORMAL UTERINE BLEEDING (AUB): ICD-10-CM

## 2022-07-15 DIAGNOSIS — N92.1 MENORRHAGIA WITH IRREGULAR CYCLE: Primary | ICD-10-CM

## 2022-07-15 DIAGNOSIS — R93.5 ABNORMAL ULTRASOUND OF ENDOMETRIUM: ICD-10-CM

## 2022-07-15 PROCEDURE — 25010000002 DEXAMETHASONE PER 1 MG: Performed by: NURSE ANESTHETIST, CERTIFIED REGISTERED

## 2022-07-15 PROCEDURE — 25010000002 PROPOFOL 10 MG/ML EMULSION: Performed by: NURSE ANESTHETIST, CERTIFIED REGISTERED

## 2022-07-15 PROCEDURE — 25010000002 KETOROLAC TROMETHAMINE PER 15 MG: Performed by: NURSE ANESTHETIST, CERTIFIED REGISTERED

## 2022-07-15 PROCEDURE — 58558 HYSTEROSCOPY BIOPSY: CPT | Performed by: OBSTETRICS & GYNECOLOGY

## 2022-07-15 PROCEDURE — 25010000002 METOCLOPRAMIDE PER 10 MG: Performed by: NURSE ANESTHETIST, CERTIFIED REGISTERED

## 2022-07-15 PROCEDURE — 58300 INSERT INTRAUTERINE DEVICE: CPT | Performed by: OBSTETRICS & GYNECOLOGY

## 2022-07-15 PROCEDURE — 25010000002 FENTANYL CITRATE (PF) 50 MCG/ML SOLUTION: Performed by: NURSE ANESTHETIST, CERTIFIED REGISTERED

## 2022-07-15 PROCEDURE — 0 LIDOCAINE 1 % SOLUTION: Performed by: OBSTETRICS & GYNECOLOGY

## 2022-07-15 PROCEDURE — C1889 IMPLANT/INSERT DEVICE, NOC: HCPCS | Performed by: OBSTETRICS & GYNECOLOGY

## 2022-07-15 PROCEDURE — 25010000002 ONDANSETRON PER 1 MG: Performed by: NURSE ANESTHETIST, CERTIFIED REGISTERED

## 2022-07-15 DEVICE — IUD LEVONORGESTREL MIRENA 52MG: Type: IMPLANTABLE DEVICE | Site: UTERUS | Status: FUNCTIONAL

## 2022-07-15 RX ORDER — KETAMINE HYDROCHLORIDE 50 MG/ML
INJECTION, SOLUTION, CONCENTRATE INTRAMUSCULAR; INTRAVENOUS AS NEEDED
Status: DISCONTINUED | OUTPATIENT
Start: 2022-07-15 | End: 2022-07-15 | Stop reason: SURG

## 2022-07-15 RX ORDER — ONDANSETRON 2 MG/ML
INJECTION INTRAMUSCULAR; INTRAVENOUS AS NEEDED
Status: DISCONTINUED | OUTPATIENT
Start: 2022-07-15 | End: 2022-07-15 | Stop reason: SURG

## 2022-07-15 RX ORDER — LIDOCAINE HYDROCHLORIDE 10 MG/ML
INJECTION, SOLUTION INFILTRATION; PERINEURAL AS NEEDED
Status: DISCONTINUED | OUTPATIENT
Start: 2022-07-15 | End: 2022-07-15 | Stop reason: HOSPADM

## 2022-07-15 RX ORDER — IBUPROFEN 600 MG/1
600 TABLET ORAL EVERY 6 HOURS PRN
Status: DISCONTINUED | OUTPATIENT
Start: 2022-07-15 | End: 2022-07-15 | Stop reason: HOSPADM

## 2022-07-15 RX ORDER — PROPOFOL 10 MG/ML
VIAL (ML) INTRAVENOUS AS NEEDED
Status: DISCONTINUED | OUTPATIENT
Start: 2022-07-15 | End: 2022-07-15 | Stop reason: SURG

## 2022-07-15 RX ORDER — HYDROCODONE BITARTRATE AND ACETAMINOPHEN 5; 325 MG/1; MG/1
1 TABLET ORAL ONCE AS NEEDED
Status: DISCONTINUED | OUTPATIENT
Start: 2022-07-15 | End: 2022-07-15 | Stop reason: HOSPADM

## 2022-07-15 RX ORDER — METOCLOPRAMIDE HYDROCHLORIDE 5 MG/ML
INJECTION INTRAMUSCULAR; INTRAVENOUS AS NEEDED
Status: DISCONTINUED | OUTPATIENT
Start: 2022-07-15 | End: 2022-07-15 | Stop reason: SURG

## 2022-07-15 RX ORDER — IBUPROFEN 600 MG/1
600 TABLET ORAL EVERY 6 HOURS PRN
Qty: 30 TABLET | Refills: 1 | Status: SHIPPED | OUTPATIENT
Start: 2022-07-15

## 2022-07-15 RX ORDER — DEXAMETHASONE SODIUM PHOSPHATE 4 MG/ML
INJECTION, SOLUTION INTRA-ARTICULAR; INTRALESIONAL; INTRAMUSCULAR; INTRAVENOUS; SOFT TISSUE AS NEEDED
Status: DISCONTINUED | OUTPATIENT
Start: 2022-07-15 | End: 2022-07-15 | Stop reason: SURG

## 2022-07-15 RX ORDER — HYDROCODONE BITARTRATE AND ACETAMINOPHEN 5; 325 MG/1; MG/1
1 TABLET ORAL EVERY 6 HOURS PRN
Qty: 4 TABLET | Refills: 0 | Status: SHIPPED | OUTPATIENT
Start: 2022-07-15 | End: 2022-08-31

## 2022-07-15 RX ORDER — ONDANSETRON 2 MG/ML
4 INJECTION INTRAMUSCULAR; INTRAVENOUS ONCE AS NEEDED
Status: DISCONTINUED | OUTPATIENT
Start: 2022-07-15 | End: 2022-07-15 | Stop reason: HOSPADM

## 2022-07-15 RX ORDER — KETOROLAC TROMETHAMINE 30 MG/ML
INJECTION, SOLUTION INTRAMUSCULAR; INTRAVENOUS AS NEEDED
Status: DISCONTINUED | OUTPATIENT
Start: 2022-07-15 | End: 2022-07-15 | Stop reason: SURG

## 2022-07-15 RX ORDER — SODIUM CHLORIDE, SODIUM LACTATE, POTASSIUM CHLORIDE, CALCIUM CHLORIDE 600; 310; 30; 20 MG/100ML; MG/100ML; MG/100ML; MG/100ML
1000 INJECTION, SOLUTION INTRAVENOUS CONTINUOUS
Status: DISCONTINUED | OUTPATIENT
Start: 2022-07-15 | End: 2022-07-15 | Stop reason: HOSPADM

## 2022-07-15 RX ORDER — LIDOCAINE HYDROCHLORIDE 20 MG/ML
INJECTION, SOLUTION INTRAVENOUS AS NEEDED
Status: DISCONTINUED | OUTPATIENT
Start: 2022-07-15 | End: 2022-07-15 | Stop reason: SURG

## 2022-07-15 RX ORDER — FENTANYL CITRATE 50 UG/ML
INJECTION, SOLUTION INTRAMUSCULAR; INTRAVENOUS AS NEEDED
Status: DISCONTINUED | OUTPATIENT
Start: 2022-07-15 | End: 2022-07-15 | Stop reason: SURG

## 2022-07-15 RX ORDER — ONDANSETRON 4 MG/1
4 TABLET, FILM COATED ORAL ONCE AS NEEDED
Status: DISCONTINUED | OUTPATIENT
Start: 2022-07-15 | End: 2022-07-15 | Stop reason: HOSPADM

## 2022-07-15 RX ADMIN — METOCLOPRAMIDE 10 MG: 5 INJECTION, SOLUTION INTRAMUSCULAR; INTRAVENOUS at 09:51

## 2022-07-15 RX ADMIN — ONDANSETRON 4 MG: 2 INJECTION INTRAMUSCULAR; INTRAVENOUS at 09:51

## 2022-07-15 RX ADMIN — KETOROLAC TROMETHAMINE 30 MG: 30 INJECTION, SOLUTION INTRAMUSCULAR at 09:51

## 2022-07-15 RX ADMIN — LIDOCAINE HYDROCHLORIDE 100 MG: 20 INJECTION, SOLUTION INTRAVENOUS at 09:51

## 2022-07-15 RX ADMIN — KETAMINE HYDROCHLORIDE 50 MG: 50 INJECTION, SOLUTION INTRAMUSCULAR; INTRAVENOUS at 09:51

## 2022-07-15 RX ADMIN — SODIUM CHLORIDE, POTASSIUM CHLORIDE, SODIUM LACTATE AND CALCIUM CHLORIDE 1000 ML: 600; 310; 30; 20 INJECTION, SOLUTION INTRAVENOUS at 09:11

## 2022-07-15 RX ADMIN — GLYCOPYRROLATE 0.2 MCG: 0.2 INJECTION, SOLUTION INTRAMUSCULAR; INTRAVITREAL at 09:51

## 2022-07-15 RX ADMIN — PROPOFOL 200 MG: 10 INJECTION, EMULSION INTRAVENOUS at 10:04

## 2022-07-15 RX ADMIN — DEXAMETHASONE SODIUM PHOSPHATE 10 MG: 4 INJECTION, SOLUTION INTRAMUSCULAR; INTRAVENOUS at 09:51

## 2022-07-15 RX ADMIN — FENTANYL CITRATE 100 MCG: 50 INJECTION INTRAMUSCULAR; INTRAVENOUS at 09:51

## 2022-07-15 NOTE — ANESTHESIA POSTPROCEDURE EVALUATION
Patient: Gracie Clemente    Procedure Summary     Date: 07/15/22 Room / Location: [facility] OR 2 /  [facility]    Anesthesia Start: 0946 Anesthesia Stop:     Procedure: DILATATION AND CURETTAGE HYSTEROSCOPY, Mirena IUD insertion (N/A Uterus) Diagnosis:       Abnormal uterine bleeding (AUB)      (Abnormal uterine bleeding (AUB) [N93.9])    Surgeons: Yasir Lenz MD Provider: Wes Sullivan CRNA    Anesthesia Type: MAC ASA Status: 3          Anesthesia Type: MAC    Vitals  Vitals Value Taken Time   /87 07/15/22 1045   Temp 97 °F (36.1 °C) 07/15/22 1015   Pulse 80 07/15/22 1045   Resp 18 07/15/22 1045   SpO2 97 % 07/15/22 1045           Post Anesthesia Care and Evaluation    Patient location during evaluation: PHASE II  Patient participation: complete - patient participated  Level of consciousness: awake  Pain score: 1  Pain management: adequate    Airway patency: patent  Anesthetic complications: No anesthetic complications  PONV Status: controlled  Cardiovascular status: acceptable and stable  Respiratory status: acceptable  Hydration status: acceptable

## 2022-07-15 NOTE — ANESTHESIA PREPROCEDURE EVALUATION
Anesthesia Evaluation     Patient summary reviewed and Nursing notes reviewed   no history of anesthetic complications:  NPO Solid Status: > 8 hours  NPO Liquid Status: > 8 hours           Airway   Mallampati: III  TM distance: >3 FB  Neck ROM: full  Possible difficult intubation, Large neck circumference and Difficult intubation highly probable  Dental - normal exam     Pulmonary - normal exam   (+) a smoker Current Smoked day of surgery,   Cardiovascular - negative cardio ROS and normal exam  Exercise tolerance: good (4-7 METS)        Neuro/Psych  (+) headaches, numbness, psychiatric history Anxiety and Depression,    GI/Hepatic/Renal/Endo    (+) obesity, morbid obesity, GERD, GI bleeding ,     Musculoskeletal     (+) back pain,   Abdominal   (+) obese,    Substance History   (+) alcohol use, drug use      Comment: marijuana   OB/GYN negative ob/gyn ROS         Other                          Anesthesia Plan    ASA 3     MAC     (Patient advised that intravenous sedation would be used as the primary anesthetic, along with local anesthesia if necessary. Every effort will be made to make sure the patient is comfortable.     The patient was told that they may experience recall of the procedure. The patient was further advised that if the MAC anesthetic was deemed ineffective that general anesthesia administered via LMA or ETT may be required.    Patient verbalized understanding and agreed to plan of care. )  intravenous induction     Anesthetic plan, risks, benefits, and alternatives have been provided, discussed and informed consent has been obtained with: patient.    Plan discussed with CRNA.

## 2022-07-15 NOTE — OP NOTE
LILLIANA Valles  Gracie Clemente  : 1995  MRN: [id number]  CSN: [id number]  Date: 7/15/2022    Operative Report    DILATATION AND CURETTAGE HYSTEROSCOPY      Pre-op Diagnosis:  Abnormal uterine bleeding (AUB) [N93.9]   Post-op Diagnosis:  Post-Op Diagnosis Codes:     * Abnormal uterine bleeding (AUB) [N93.9]   Procedure: Procedure(s):  DILATATION AND CURETTAGE HYSTEROSCOPY, Mirena IUD insertion   Surgeon: ESPERANZA Lenz M.D.   Assist: ELISE Carvajal  was responsible for performing the following activities: Retraction and their skilled assistance was necessary for the success of this case.     Anesthesia: Sedation   Estimated Blood Loss: <5 mls   ABx: none   Specimens:  Endometrial curettings   Findings: Very thickened, ragged endometrium, tubal ostia noted biltareally. Uterus sounded to 9.5cm, large amount of endometrial tissue retrieved.    Complications: none   Indications:   Patient is a 27-year-old presented for menometrorrhagia with bleeding for the last month heavy at times.  She is did this 2 times over the past 2 years necessitating hysteroscopy with D&C however she never had any long-term management strategies and typically would then just skipped many many months as 1 would assume the endometrium lining continue to second until the breakthrough bleeding crossed over into heavy prolonged menorrhagia.  At the time of being seen in the office for her ultrasound showed a very thickened heterogeneous lining at 24 mm.  Given the significant thickness of this lining and patient's body mass index we decided to do a D&C but at the time also do a Mirena for long-term management.  Risks benefits and alternatives were discussed and all questions were answered.     Description of Procedure:  After the appropriate time out and adequate dosing of her anesthesia, the patient had been prepped and draped in the usual sterile fashion.  She was placed in the dorsal lithotomy position using Dwight stirrups.  The  bladder had been drained with a red rubber catheter per nursing.  A weighted speculum was placed in the vagina.  The anterior lip of the cervix was grasped with a single-tooth tenaculum.  The cervix was injected at the 3 o'clock and 9 o'clock position with 1% lidocaine plain without any complications.  The cervix was then progressively dilated using Roa dilators.  Rigid hysteroscopy was then performed with the above findings noted.  Sharp curettings were then obtained with a good cry throughout with tissue retrieved and sent for pathologic specimen.  The uterus was sounded to 9.5 cms.  The intrauterine contraceptive device was gently inserted per the 's instructions.  The IUD strings were trimmed to 2.5 cms from the cervical os.  The cervical tenaculum was removed and the cervix was noted to be hemostatic.  All instrument and sponge counts were correct at the end of the procedure.  The patient tolerated the procedure well.  There were no complications.  She was taken to the postoperative recovery room in stable condition.    ESPERANZA Lenz M.D.  7/15/2022  10:15 EDT

## 2022-07-18 LAB — REF LAB TEST METHOD: NORMAL

## 2022-07-21 ENCOUNTER — TELEPHONE (OUTPATIENT)
Dept: OBSTETRICS AND GYNECOLOGY | Facility: CLINIC | Age: 27
End: 2022-07-21

## 2022-07-21 NOTE — TELEPHONE ENCOUNTER
Called and left message for patient that she needs to come in for urine check either today before 4 or tomorrow after 8:30

## 2022-07-21 NOTE — TELEPHONE ENCOUNTER
Patient calls with burning with urination and frequency.this has been going on since her D&C and Mirena insertion that Dr Lenz did 1 week ago. Can you just treat her for this?

## 2022-07-24 ENCOUNTER — HOSPITAL ENCOUNTER (EMERGENCY)
Facility: HOSPITAL | Age: 27
Discharge: HOME | End: 2022-07-24
Attending: EMERGENCY MEDICINE
Payer: MEDICAID

## 2022-07-24 VITALS
RESPIRATION RATE: 18 BRPM | OXYGEN SATURATION: 100 % | SYSTOLIC BLOOD PRESSURE: 113 MMHG | DIASTOLIC BLOOD PRESSURE: 84 MMHG | HEIGHT: 65 IN | TEMPERATURE: 97.5 F | BODY MASS INDEX: 48.82 KG/M2 | WEIGHT: 293 LBS | HEART RATE: 89 BPM

## 2022-07-24 DIAGNOSIS — B37.49 CANDIDAL UTI (URINARY TRACT INFECTION): Primary | ICD-10-CM

## 2022-07-24 LAB
A/G RATIO: 0.9 (ref 0.8–2)
ALBUMIN SERPL-MCNC: 3.6 G/DL (ref 3.4–4.8)
ALP BLD-CCNC: 75 U/L (ref 25–100)
ALT SERPL-CCNC: 8 U/L (ref 4–36)
ANION GAP SERPL CALCULATED.3IONS-SCNC: 11 MMOL/L (ref 3–16)
AST SERPL-CCNC: 13 U/L (ref 8–33)
BACTERIA: ABNORMAL /HPF
BASOPHILS ABSOLUTE: 0.1 K/UL (ref 0–0.1)
BASOPHILS RELATIVE PERCENT: 0.6 %
BILIRUB SERPL-MCNC: <0.2 MG/DL (ref 0.3–1.2)
BILIRUBIN URINE: NEGATIVE
BLOOD, URINE: ABNORMAL
BUN BLDV-MCNC: 10 MG/DL (ref 6–20)
CALCIUM SERPL-MCNC: 9 MG/DL (ref 8.5–10.5)
CHLORIDE BLD-SCNC: 106 MMOL/L (ref 98–107)
CLARITY: ABNORMAL
CO2: 22 MMOL/L (ref 20–30)
COLOR: YELLOW
CREAT SERPL-MCNC: 0.7 MG/DL (ref 0.4–1.2)
EOSINOPHILS ABSOLUTE: 0.4 K/UL (ref 0–0.4)
EOSINOPHILS RELATIVE PERCENT: 2.5 %
EPITHELIAL CELLS, UA: ABNORMAL /HPF (ref 0–5)
GFR AFRICAN AMERICAN: >59
GFR NON-AFRICAN AMERICAN: >60
GLOBULIN: 3.8 G/DL
GLUCOSE BLD-MCNC: 91 MG/DL (ref 74–106)
GLUCOSE URINE: NEGATIVE MG/DL
HCG(URINE) PREGNANCY TEST: NEGATIVE
HCT VFR BLD CALC: 41.8 % (ref 37–47)
HEMOGLOBIN: 13.3 G/DL (ref 11.5–16.5)
IMMATURE GRANULOCYTES #: 0.1 K/UL
IMMATURE GRANULOCYTES %: 0.4 % (ref 0–5)
KETONES, URINE: NEGATIVE MG/DL
LEUKOCYTE ESTERASE, URINE: ABNORMAL
LYMPHOCYTES ABSOLUTE: 3.7 K/UL (ref 1.5–4)
LYMPHOCYTES RELATIVE PERCENT: 23.1 %
MCH RBC QN AUTO: 28 PG (ref 27–32)
MCHC RBC AUTO-ENTMCNC: 31.8 G/DL (ref 31–35)
MCV RBC AUTO: 88 FL (ref 80–100)
MICROSCOPIC EXAMINATION: YES
MONOCYTES ABSOLUTE: 1.4 K/UL (ref 0.2–0.8)
MONOCYTES RELATIVE PERCENT: 8.5 %
NEUTROPHILS ABSOLUTE: 10.4 K/UL (ref 2–7.5)
NEUTROPHILS RELATIVE PERCENT: 64.9 %
NITRITE, URINE: NEGATIVE
PDW BLD-RTO: 13.3 % (ref 11–16)
PH UA: 5 (ref 5–8)
PLATELET # BLD: 267 K/UL (ref 150–400)
PMV BLD AUTO: 11 FL (ref 6–10)
POTASSIUM REFLEX MAGNESIUM: 3.8 MMOL/L (ref 3.4–5.1)
PROTEIN UA: NEGATIVE MG/DL
RBC # BLD: 4.75 M/UL (ref 3.8–5.8)
RBC UA: ABNORMAL /HPF (ref 0–4)
SODIUM BLD-SCNC: 139 MMOL/L (ref 136–145)
SPECIFIC GRAVITY UA: 1.01 (ref 1–1.03)
TOTAL PROTEIN: 7.4 G/DL (ref 6.4–8.3)
URINE REFLEX TO CULTURE: YES
URINE TYPE: ABNORMAL
UROBILINOGEN, URINE: 0.2 E.U./DL
WBC # BLD: 16.1 K/UL (ref 4–11)
WBC UA: ABNORMAL /HPF (ref 0–5)
YEAST: PRESENT /HPF

## 2022-07-24 PROCEDURE — 81001 URINALYSIS AUTO W/SCOPE: CPT

## 2022-07-24 PROCEDURE — 85025 COMPLETE CBC W/AUTO DIFF WBC: CPT

## 2022-07-24 PROCEDURE — 87086 URINE CULTURE/COLONY COUNT: CPT

## 2022-07-24 PROCEDURE — 36415 COLL VENOUS BLD VENIPUNCTURE: CPT

## 2022-07-24 PROCEDURE — 87186 SC STD MICRODIL/AGAR DIL: CPT

## 2022-07-24 PROCEDURE — 6370000000 HC RX 637 (ALT 250 FOR IP): Performed by: EMERGENCY MEDICINE

## 2022-07-24 PROCEDURE — 84703 CHORIONIC GONADOTROPIN ASSAY: CPT

## 2022-07-24 PROCEDURE — 80053 COMPREHEN METABOLIC PANEL: CPT

## 2022-07-24 PROCEDURE — 99283 EMERGENCY DEPT VISIT LOW MDM: CPT

## 2022-07-24 PROCEDURE — 87077 CULTURE AEROBIC IDENTIFY: CPT

## 2022-07-24 RX ORDER — FLUCONAZOLE 100 MG/1
200 TABLET ORAL ONCE
Status: COMPLETED | OUTPATIENT
Start: 2022-07-24 | End: 2022-07-24

## 2022-07-24 RX ORDER — FLUCONAZOLE 100 MG/1
100 TABLET ORAL DAILY
Qty: 7 TABLET | Refills: 0 | Status: SHIPPED | OUTPATIENT
Start: 2022-07-24 | End: 2022-07-31

## 2022-07-24 RX ADMIN — FLUCONAZOLE 200 MG: 100 TABLET ORAL at 17:47

## 2022-07-24 ASSESSMENT — PAIN DESCRIPTION - FREQUENCY: FREQUENCY: CONTINUOUS

## 2022-07-24 ASSESSMENT — PAIN - FUNCTIONAL ASSESSMENT
PAIN_FUNCTIONAL_ASSESSMENT: 0-10
PAIN_FUNCTIONAL_ASSESSMENT: 0-10

## 2022-07-24 ASSESSMENT — PAIN DESCRIPTION - DESCRIPTORS: DESCRIPTORS: DISCOMFORT;JABBING;SHOOTING

## 2022-07-24 ASSESSMENT — PAIN SCALES - GENERAL
PAINLEVEL_OUTOF10: 7
PAINLEVEL_OUTOF10: 6

## 2022-07-24 ASSESSMENT — PAIN DESCRIPTION - LOCATION: LOCATION: BACK

## 2022-07-24 NOTE — ED NOTES
Reviewed discharge plan with Kevin Moser. Encouraged her to f/u with Perez Mabry PA-C and she understood. NAD noted on discharge, gait steady. Reviewed discharge prescription for:     Current Discharge Medication List        START taking these medications    Details   fluconazole (DIFLUCAN) 100 MG tablet Take 1 tablet by mouth in the morning for 7 days. Qty: 7 tablet, Refills: 0             Jen states understanding of how and when to take medications.       Electronically signed by Aurelia Archibald RN on 7/24/2022 at [place] RN  07/24/22 0778

## 2022-07-24 NOTE — ED NOTES
Reviewed discharge plan with Sharon Gilbert. Encouraged her to f/u with Beltran Duncan PA-C and with her obgyn and she understood. NAD noted on discharge, gait steady. Reviewed discharge prescription for:     Current Discharge Medication List        START taking these medications    Details   fluconazole (DIFLUCAN) 100 MG tablet Take 1 tablet by mouth in the morning for 7 days. Qty: 7 tablet, Refills: 0             Jen states understanding of how and when to take medications.       Electronically signed by Brenda Garza RN on 7/24/2022 at [id number], RN  07/24/22 [id number]

## 2022-07-24 NOTE — LETTER
[facility]  [facility] [address]  Phone: [phone]               July 24, 2022    Patient: Opal Avila   YOB: 1995   Date of Visit: 7/24/2022       To Whom It May Concern:    Opal Avila was seen and treated in our emergency department on 7/24/2022. She may return to work on 7/26/2022.       Sincerely,       Sean Denton RN         Signature:__________________________________

## 2022-07-24 NOTE — ED TRIAGE NOTES
Pt states that she had an IUD placed July 15th. Pt states that since the 16th of July she has had urinary frequency and painful urination.

## 2022-07-24 NOTE — ED PROVIDER NOTES
[address] [facility] [address] ENCOUNTER      Pt Name: Loulou Kwok  MRN: [id number]  [place]: 1995  Date of evaluation: 7/24/2022  Provider: Katt Bah MD    CHIEF COMPLAINT       Chief Complaint   Patient presents with    Urinary Frequency         HISTORY OF PRESENT ILLNESS  (Location/Symptom, Timing/Onset, Context/Setting, Quality, Duration, Modifying Factors, Severity.)   Loulou Kwok is a 24 y.o. female who presents to the emergency department of urinary frequency ever since she had her D&C done and IUD inserted 9 days ago she then developed bilateral flank pain for the last 3 days she also feels like she is not completely emptying her bladder she states that she will go to the bathroom and then after she thinks she is done so actually lose a little bit of urine into her underwear. She seen some blood she is not sure if it was in her urine or if it was vaginal spotting. The IUD was placed in [facility]  performed by Dr. Cam Zelaya at [facility]. She has not yet followed up. Nursing notes were reviewed. REVIEW OFSYSTEMS    (2-9 systems for level 4, 10 or more for level 5)   ROS:  General:  No fevers, no chills, no weakness  Cardiovascular:  No chest pain, no palpitations  Respiratory:  No shortness of breath, no cough, no wheezing  Gastrointestinal:  No pain, no nausea, no vomiting, no diarrhea  Musculoskeletal:  No muscle pain, no joint pain  Skin:  No rash, no easy bruising  Neurologic:  No speech problems, no headache, no extremity weakness  Psychiatric:  No anxiety  Genitourinary:  No dysuria, + hematuria    Except as noted above the remainder of the review of systems was reviewed and negative.        PAST MEDICAL HISTORY     Past Medical History:   Diagnosis Date    Anemia     Anxiety     Borderline personality disorder ([facility] [address]     Depression     GERD (gastroesophageal reflux disease)     Headache     Hypothyroidism     Obesity OCD (obsessive compulsive disorder)     PCOS (polycystic ovarian syndrome)     Trichotillomania          SURGICAL HISTORY       Past Surgical History:   Procedure Laterality Date    WISDOM TOOTH EXTRACTION           CURRENT MEDICATIONS       Previous Medications    CYCLOBENZAPRINE (FLEXERIL) 10 MG TABLET    Take 1 tablet by mouth 3 times daily as needed for Muscle spasms    FLUOXETINE (PROZAC) 20 MG CAPSULE    Take 1 capsule by mouth once daily    FLUTICASONE (FLONASE) 50 MCG/ACT NASAL SPRAY    1 spray by Each Nostril route daily    MELOXICAM (MOBIC) 15 MG TABLET    Take 1 tablet by mouth daily    ONDANSETRON (ZOFRAN) 4 MG TABLET    Take 2 tablets by mouth every 8 hours as needed for Nausea or Vomiting    TOPIRAMATE (TOPAMAX) 50 MG TABLET    Take one tablet daily       ALLERGIES     Bee venom, Sertraline, and Zoloft    FAMILY HISTORY       Family History   Problem Relation Age of Onset    Diabetes Mother     Depression Mother     High Blood Pressure Father     Alcohol Abuse Father     Stroke Father     Depression Sister     Depression Paternal Aunt     Cancer Paternal Aunt         lung    Diabetes Maternal Grandmother     Depression Maternal Grandmother     High Blood Pressure Maternal Grandmother     Diabetes Maternal Grandfather     High Blood Pressure Maternal Grandfather     Stroke Maternal Grandfather     Diabetes Paternal Grandmother     Cancer Paternal Grandfather         lung          SOCIAL HISTORY       Social History     Socioeconomic History    Marital status:      Spouse name: None    Number of children: None    Years of education: None    Highest education level: None   Tobacco Use    Smoking status: Every Day     Packs/day: 1.00     Years: 2.00     Pack years: 2.00     Types: Cigarettes     Last attempt to quit: 12/2018     Years since quitting: 3.6    Smokeless tobacco: Never   Vaping Use    Vaping Use: Former    Substances: Nicotine, Flavoring    Devices: Refillable tank    Passive vaping exposure: Yes   Substance and Sexual Activity    Alcohol use: Yes     Comment: occ    Drug use: Not Currently    Sexual activity: Yes     Partners: Male     Social Determinants of Health     Financial Resource Strain: Low Risk     Difficulty of Paying Living Expenses: Not hard at all   Food Insecurity: No Food Insecurity    Worried About Running Out of Food in the Last Year: Never true    Ran Out of Food in the Last Year: Never true   Physical Activity: Unknown    Days of Exercise per Week: 0 days   Intimate Partner Violence: Not At Risk    Fear of Current or Ex-Partner: No    Emotionally Abused: No    Physically Abused: No    Sexually Abused: No         PHYSICAL EXAM    (up to 7 for level 4, 8 or more for level 5)     ED Triage Vitals [07/24/22 1627]   BP Temp Temp Source Heart Rate Resp SpO2 Height Weight   130/83 97.5 °F (36.4 °C) Oral 89 18 99 % 5' 5\" (1.651 m) (!) 358 lb (162.4 kg)       Physical Exam  General :Patient is awake, alert, oriented, in no acute distress, nontoxic appearing  HEENT: Pupils are equally round and reactive to light, EOMI, conjunctivae clear. Neck: Neck is supple, full range of motion, trachea midline  Cardiac: Heart regular rate, rhythm, no murmurs, rubs, or gallops  Lungs: Lungs are clear to auscultation, there is no wheezing, rhonchi, or rales. There is no use of accessory muscles. Chest wall: There is no tenderness to palpation over the chest wall or over ribs  Abdomen: Abdomen is soft, nontender, nondistended. There is no firm or pulsatile masses, no rebound rigidity or guarding. Musculoskeletal: 5 out of 5 strength in all 4 extremities. No focal muscle deficits are appreciated  Neuro: Motor intact, sensory intact, level of consciousness is normal, Dermatology: Skin is warm and dry  Psych: Mentation is grossly normal, cognition is grossly normal. Affect is appropriate.       DIAGNOSTIC RESULTS     EKG: All EKG's are interpreted by the Emergency Department Physician who either signs or Co-signs this chart in the absenceof a cardiologist.    The EKG interpreted by me shows     RADIOLOGY:   Non-plain film images such as CT, Ultrasound and MRI are read by the radiologist. Plain radiographic images are visualized and preliminarily interpreted by the emergency physician with the below findings:      [] Radiologist's Report Reviewed:  No orders to display         ED BEDSIDE ULTRASOUND:   Performed by ED Physician - none    LABS:    I have reviewed and interpreted all of the currently available lab results from this visit (ifapplicable):  Results for orders placed or performed during the hospital encounter of 07/24/22   Urinalysis with Reflex to Culture    Specimen: Urine   Result Value Ref Range    Color, UA Yellow Straw/Yellow    Clarity, UA SLCLOUDY Clear    Glucose, Ur Negative Negative mg/dL    Bilirubin Urine Negative Negative    Ketones, Urine Negative Negative mg/dL    Specific Gravity, UA 1.015 1.005 - 1.030    Blood, Urine LARGE (A) Negative    pH, UA 5.0 5.0 - 8.0    Protein, UA Negative Negative mg/dL    Urobilinogen, Urine 0.2 <2.0 E.U./dL    Nitrite, Urine Negative Negative    Leukocyte Esterase, Urine TRACE (A) Negative    Microscopic Examination YES     Urine Type NotGiven     Urine Reflex to Culture Yes    Microscopic Urinalysis   Result Value Ref Range    WBC, UA 10-20 (A) 0 - 5 /HPF    RBC, UA 21-50 (A) 0 - 4 /HPF    Epithelial Cells, UA 2-5 0 - 5 /HPF    Bacteria, UA 4+ (A) None Seen /HPF    Yeast, UA Present (A) None Seen /HPF   Pregnancy, Urine   Result Value Ref Range    HCG(Urine) Pregnancy Test Negative Detects HCG level >20 MIU/mL   CBC with Auto Differential   Result Value Ref Range    WBC 16.1 (H) 4.0 - 11.0 K/uL    RBC 4.75 3.80 - 5.80 M/uL    Hemoglobin 13.3 11.5 - 16.5 g/dL    Hematocrit 41.8 37.0 - 47.0 %    MCV 88.0 80.0 - 100.0 fL    MCH 28.0 27.0 - 32.0 pg    MCHC 31.8 31.0 - 35.0 g/dL    RDW 13.3 11.0 - 16.0 %    Platelets 286 279 - 000 K/uL    MPV 11.0 (H) 6.0 - 10.0 fL    Neutrophils % 64.9 %    Immature Granulocytes % 0.4 0.0 - 5.0 %    Lymphocytes % 23.1 %    Monocytes % 8.5 %    Eosinophils % 2.5 %    Basophils % 0.6 %    Neutrophils Absolute 10.4 (H) 2.0 - 7.5 K/uL    Immature Granulocytes # 0.1 K/uL    Lymphocytes Absolute 3.7 1.5 - 4.0 K/uL    Monocytes Absolute 1.4 (H) 0.2 - 0.8 K/uL    Eosinophils Absolute 0.4 0.0 - 0.4 K/uL    Basophils Absolute 0.1 0.0 - 0.1 K/uL   Comprehensive Metabolic Panel w/ Reflex to MG   Result Value Ref Range    Sodium 139 136 - 145 mmol/L    Potassium reflex Magnesium 3.8 3.4 - 5.1 mmol/L    Chloride 106 98 - 107 mmol/L    CO2 22 20 - 30 mmol/L    Anion Gap 11 3 - 16    Glucose 91 74 - 106 mg/dL    BUN 10 6 - 20 mg/dL    Creatinine 0.7 0.4 - 1.2 mg/dL    GFR Non-African American >60 >59    GFR African American >59 >59    Calcium 9.0 8.5 - 10.5 mg/dL    Total Protein 7.4 6.4 - 8.3 g/dL    Albumin 3.6 3.4 - 4.8 g/dL    Albumin/Globulin Ratio 0.9 0.8 - 2.0    Total Bilirubin <0.2 (L) 0.3 - 1.2 mg/dL    Alkaline Phosphatase 75 25 - 100 U/L    ALT 8 4 - 36 U/L    AST 13 8 - 33 U/L    Globulin 3.8 Not Established g/dL        All other labs were within normal range or not returned as of this dictation.     EMERGENCY DEPARTMENT COURSE and DIFFERENTIAL DIAGNOSIS/MDM:   Vitals:    Vitals:    07/24/22 1627   BP: 130/83   Pulse: 89   Resp: 18   Temp: 97.5 °F (36.4 °C)   TempSrc: Oral   SpO2: 99%   Weight: (!) 358 lb (162.4 kg)   Height: 5' 5\" (1.651 m)       MEDICATIONS ADMINISTERED IN ED:  Medications   fluconazole (DIFLUCAN) tablet 200 mg (has no administration in time range)       Has been stable she does have urinary tract infection but she also has hematuria this could include some vaginal bleeding given the recent procedure she does have a yeast pregnant in her urine present in her urine White count is 16.1 we will go ahead and start her on Diflucan for urinary yeast infection and have her follow-up with her gynecologist in the next day or 2.  Is this patient to be included in the SEP-1 Core Measure due to severe sepsis or septic shock? No   Exclusion criteria - the patient is NOT to be included for SEP-1 Core Measure due to:  May have criteria for sepsis, but does not meet criteria for severe sepsis or septic shock     The patient will follow-up with their PCP in 1-2 days for reevaluation. If the patient or family members have anyfurther concerns or any worsening symptoms they will return to the ED for reevaluation. CONSULTS:  None    PROCEDURES:  Procedures    CRITICAL CARE TIME    Total Critical Care time was 0 minutes, excluding separately reportable procedures. There was a high probability of clinically significant/life threatening deterioration in the patient's condition which required my urgent intervention. FINAL IMPRESSION      1. Candidal UTI (urinary tract infection) New Problem         DISPOSITION/PLAN   DISPOSITION Decision To Discharge 07/24/2022 05:37:00 PM    Stable discharge to home  PATIENT REFERRED TO:  Yazan Hoyt [address]  [phone]    Schedule an appointment as soon as possible for a visit in 1 day      DISCHARGE MEDICATIONS:  New Prescriptions    FLUCONAZOLE (DIFLUCAN) 100 MG TABLET    Take 1 tablet by mouth in the morning for 7 days. Comment: Please note this report has been produced using speech recognition software and may contain errorsrelated to that system including errors in grammar, punctuation, and spelling, as well as words and phrases that may be inappropriate. If there are any questions or concerns please feel free to contact the dictating providerfor clarification.     Bart Sanders MD  Attending Emergency Physician               Bart Sanders MD  07/24/22 9800

## 2022-07-26 ENCOUNTER — APPOINTMENT (OUTPATIENT)
Dept: CT IMAGING | Facility: HOSPITAL | Age: 27
End: 2022-07-26

## 2022-07-26 ENCOUNTER — HOSPITAL ENCOUNTER (EMERGENCY)
Facility: HOSPITAL | Age: 27
Discharge: HOME | End: 2022-07-26
Attending: EMERGENCY MEDICINE | Admitting: EMERGENCY MEDICINE

## 2022-07-26 VITALS
BODY MASS INDEX: 48.82 KG/M2 | SYSTOLIC BLOOD PRESSURE: 109 MMHG | OXYGEN SATURATION: 100 % | HEART RATE: 96 BPM | RESPIRATION RATE: 20 BRPM | WEIGHT: 293 LBS | DIASTOLIC BLOOD PRESSURE: 64 MMHG | HEIGHT: 65 IN | TEMPERATURE: 98.3 F

## 2022-07-26 DIAGNOSIS — R39.15 URINARY URGENCY: Primary | ICD-10-CM

## 2022-07-26 DIAGNOSIS — R30.0 DYSURIA: ICD-10-CM

## 2022-07-26 LAB
ALBUMIN SERPL-MCNC: 3.8 G/DL (ref 3.5–5.2)
ALBUMIN/GLOB SERPL: 1 G/DL
ALP SERPL-CCNC: 80 U/L (ref 39–117)
ALT SERPL W P-5'-P-CCNC: 15 U/L (ref 1–33)
ANION GAP SERPL CALCULATED.3IONS-SCNC: 11.7 MMOL/L (ref 5–15)
AST SERPL-CCNC: 15 U/L (ref 1–32)
B-HCG UR QL: NEGATIVE
BACTERIA UR QL AUTO: ABNORMAL /HPF
BILIRUB SERPL-MCNC: 0.2 MG/DL (ref 0–1.2)
BILIRUB UR QL STRIP: NEGATIVE
BUN SERPL-MCNC: 7 MG/DL (ref 6–20)
BUN/CREAT SERPL: 10.6 (ref 7–25)
CALCIUM SPEC-SCNC: 9.2 MG/DL (ref 8.6–10.5)
CHLORIDE SERPL-SCNC: 104 MMOL/L (ref 98–107)
CLARITY UR: CLEAR
CO2 SERPL-SCNC: 21.3 MMOL/L (ref 22–29)
COLOR UR: YELLOW
CREAT SERPL-MCNC: 0.66 MG/DL (ref 0.57–1)
DEPRECATED RDW RBC AUTO: 42.3 FL (ref 37–54)
EGFRCR SERPLBLD CKD-EPI 2021: 125 ML/MIN/1.73
EOSINOPHIL # BLD MANUAL: 0.86 10*3/MM3 (ref 0–0.4)
EOSINOPHIL NFR BLD MANUAL: 5 % (ref 0.3–6.2)
ERYTHROCYTE [DISTWIDTH] IN BLOOD BY AUTOMATED COUNT: 13 % (ref 12.3–15.4)
GLOBULIN UR ELPH-MCNC: 3.9 GM/DL
GLUCOSE SERPL-MCNC: 85 MG/DL (ref 65–99)
GLUCOSE UR STRIP-MCNC: NEGATIVE MG/DL
HCT VFR BLD AUTO: 45.1 % (ref 34–46.6)
HGB BLD-MCNC: 14.2 G/DL (ref 12–15.9)
HGB UR QL STRIP.AUTO: ABNORMAL
HYALINE CASTS UR QL AUTO: ABNORMAL /LPF
KETONES UR QL STRIP: NEGATIVE
LEUKOCYTE ESTERASE UR QL STRIP.AUTO: ABNORMAL
LYMPHOCYTES # BLD MANUAL: 5.36 10*3/MM3 (ref 0.7–3.1)
LYMPHOCYTES NFR BLD MANUAL: 4 % (ref 5–12)
MCH RBC QN AUTO: 27.8 PG (ref 26.6–33)
MCHC RBC AUTO-ENTMCNC: 31.5 G/DL (ref 31.5–35.7)
MCV RBC AUTO: 88.4 FL (ref 79–97)
METAMYELOCYTES NFR BLD MANUAL: 1 % (ref 0–0)
MONOCYTES # BLD: 0.69 10*3/MM3 (ref 0.1–0.9)
NEUTROPHILS # BLD AUTO: 10.2 10*3/MM3 (ref 1.7–7)
NEUTROPHILS NFR BLD MANUAL: 58 % (ref 42.7–76)
NEUTS BAND NFR BLD MANUAL: 1 % (ref 0–5)
NITRITE UR QL STRIP: NEGATIVE
PH UR STRIP.AUTO: 7 [PH] (ref 5–8)
PLATELET # BLD AUTO: 294 10*3/MM3 (ref 140–450)
PMV BLD AUTO: 11.2 FL (ref 6–12)
POTASSIUM SERPL-SCNC: 3.8 MMOL/L (ref 3.5–5.2)
PROT SERPL-MCNC: 7.7 G/DL (ref 6–8.5)
PROT UR QL STRIP: NEGATIVE
RBC # BLD AUTO: 5.1 10*6/MM3 (ref 3.77–5.28)
RBC # UR STRIP: ABNORMAL /HPF
RBC MORPH BLD: NORMAL
REF LAB TEST METHOD: ABNORMAL
SCAN SLIDE: NORMAL
SMALL PLATELETS BLD QL SMEAR: ADEQUATE
SODIUM SERPL-SCNC: 137 MMOL/L (ref 136–145)
SP GR UR STRIP: 1.01 (ref 1–1.03)
SQUAMOUS #/AREA URNS HPF: ABNORMAL /HPF
UROBILINOGEN UR QL STRIP: ABNORMAL
VARIANT LYMPHS NFR BLD MANUAL: 2 % (ref 0–5)
VARIANT LYMPHS NFR BLD MANUAL: 29 % (ref 19.6–45.3)
WBC # UR STRIP: ABNORMAL /HPF
WBC MORPH BLD: NORMAL
WBC NRBC COR # BLD: 17.28 10*3/MM3 (ref 3.4–10.8)

## 2022-07-26 PROCEDURE — 80053 COMPREHEN METABOLIC PANEL: CPT

## 2022-07-26 PROCEDURE — 74177 CT ABD & PELVIS W/CONTRAST: CPT

## 2022-07-26 PROCEDURE — 81025 URINE PREGNANCY TEST: CPT

## 2022-07-26 PROCEDURE — 85025 COMPLETE CBC W/AUTO DIFF WBC: CPT

## 2022-07-26 PROCEDURE — 25010000002 CEFTRIAXONE SODIUM-DEXTROSE 1-3.74 GM-%(50ML) RECONSTITUTED SOLUTION

## 2022-07-26 PROCEDURE — 25010000002 IOPAMIDOL 61 % SOLUTION: Performed by: EMERGENCY MEDICINE

## 2022-07-26 PROCEDURE — 81001 URINALYSIS AUTO W/SCOPE: CPT

## 2022-07-26 PROCEDURE — 85007 BL SMEAR W/DIFF WBC COUNT: CPT

## 2022-07-26 PROCEDURE — 96365 THER/PROPH/DIAG IV INF INIT: CPT

## 2022-07-26 PROCEDURE — 99283 EMERGENCY DEPT VISIT LOW MDM: CPT

## 2022-07-26 RX ORDER — CEFTRIAXONE 1 G/50ML
1 INJECTION, SOLUTION INTRAVENOUS ONCE
Status: COMPLETED | OUTPATIENT
Start: 2022-07-26 | End: 2022-07-26

## 2022-07-26 RX ORDER — PHENAZOPYRIDINE HYDROCHLORIDE 100 MG/1
200 TABLET, FILM COATED ORAL ONCE
Status: COMPLETED | OUTPATIENT
Start: 2022-07-26 | End: 2022-07-26

## 2022-07-26 RX ORDER — PHENAZOPYRIDINE HYDROCHLORIDE 100 MG/1
100 TABLET, FILM COATED ORAL 3 TIMES DAILY PRN
Qty: 6 TABLET | Refills: 0 | Status: SHIPPED | OUTPATIENT
Start: 2022-07-26 | End: 2022-08-31

## 2022-07-26 RX ORDER — CEFUROXIME AXETIL 500 MG/1
500 TABLET ORAL 2 TIMES DAILY
Qty: 10 TABLET | Refills: 0 | Status: SHIPPED | OUTPATIENT
Start: 2022-07-26 | End: 2022-07-31

## 2022-07-26 RX ORDER — SODIUM CHLORIDE 0.9 % (FLUSH) 0.9 %
10 SYRINGE (ML) INJECTION AS NEEDED
Status: DISCONTINUED | OUTPATIENT
Start: 2022-07-26 | End: 2022-07-26 | Stop reason: HOSPADM

## 2022-07-26 RX ADMIN — CEFTRIAXONE 1 G: 1 INJECTION, SOLUTION INTRAVENOUS at 18:37

## 2022-07-26 RX ADMIN — PHENAZOPYRIDINE HYDROCHLORIDE 200 MG: 100 TABLET ORAL at 18:37

## 2022-07-26 RX ADMIN — IOPAMIDOL 100 ML: 612 INJECTION, SOLUTION INTRAVENOUS at 17:01

## 2022-07-26 NOTE — ED PROVIDER NOTES
Subjective   Patient is a 27 year old female here today for flank pain and possible UTI. She reports having a D & C on 7/15 for endometrial hyperplasia. Started having urinary frequency the next day. This prompted her to go to [facility]. Was diagnosed with a yeast infection and given Diflucan. She took that dose yesterday. She notes that her frequency has improved, but is having more dysuria, urgency, and bilateral flank pain. No fever, mild nausea.         Review of Systems   Constitutional: Negative for chills and fever.   Respiratory: Negative for shortness of breath.    Cardiovascular: Negative for chest pain.   Gastrointestinal: Positive for nausea. Negative for abdominal pain, diarrhea and vomiting.   Genitourinary: Positive for dysuria, flank pain, frequency (improving) and urgency. Negative for difficulty urinating, hematuria, vaginal bleeding, vaginal discharge and vaginal pain.   Musculoskeletal: Negative for back pain.   Neurological: Negative for headaches.   All other systems reviewed and are negative.      Past Medical History:   Diagnosis Date   • Abnormal uterine bleeding (AUB)    • Amenorrhea, secondary    • Anemia    • Anxiety and depression    • Body piercing 09/28/2021    ears, nostril x2   • Borderline personality disorder (HCC)    • Gastroesophageal reflux disease    • Headache    • History of bronchitis 09/28/2021   • History of suicidal ideation 2017   • Low back pain    • Morbid obesity with BMI of 50.0-59.9, adult ([facility])    • Nightmares    • Obesity    • Rectal bleeding     Reported as history   • Recurrent major depressive disorder (HCC)    • Seasonal allergies 09/28/2021   • Tattoos 09/28/2021   • 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   • Visual disturbance 09/28/2021    Patient reported is supposed to wear Rx glasses but broke them and does not use       Allergies   Allergen Reactions   • Bee Venom Anaphylaxis   • Sertraline Hcl Other (See Comments)     Nightmares       Past  Surgical History:   Procedure Laterality Date   • COLONOSCOPY N/A 05/08/2019    Procedure: COLONOSCOPY;  Surgeon: Alex Bartholomew MD;  Location: [facility] ENDOSCOPY;  Service: Gastroenterology   • D & C HYSTEROSCOPY N/A 02/11/2020    Procedure: DILATATION AND CURETTAGE DIAGNOSTIC HYSTEROSCOPY;  Surgeon: Niya Sun MD;  Location: [facility] OR;  Service: Obstetrics/Gynecology;  Laterality: N/A;   • D & C HYSTEROSCOPY N/A 10/05/2021    Procedure: DILATATION AND CURETTAGE, DIAGNOSTIC HYSTEROSCOPY;  Surgeon: Niya Sun MD;  Location: [facility] OR;  Service: Obstetrics/Gynecology;  Laterality: N/A;   • D & C HYSTEROSCOPY N/A 7/15/2022    Procedure: DILATATION AND CURETTAGE HYSTEROSCOPY, Mirena IUD insertion;  Surgeon: Yasir Lenz MD;  Location: [facility] OR;  Service: Obstetrics/Gynecology;  Laterality: N/A;   • ENDOSCOPY     • SKIN BIOPSY Bilateral     breasts   • WISDOM TOOTH EXTRACTION         Family History   Problem Relation Age of Onset   • Depression Mother    • Diabetes Mother    • Hyperlipidemia Mother    • Hypertension Mother    • Arthritis Mother    • Mental illness Mother    • Obesity Mother    • Arthritis Father    • COPD Father    • Depression Father    • Heart disease Father    • Hypertension Father    • Hyperlipidemia Father    • Stroke Father    • Obesity Father    • Mental illness Father    • Heart attack Father    • Depression Maternal Aunt    • Diabetes Maternal Aunt    • Hypertension Maternal Aunt    • Mental illness Maternal Aunt    • Obesity Maternal Aunt    • Cancer Paternal Aunt    • Breast cancer Paternal Aunt    • Diabetes Other         Grandmother    • Mental illness Sister    • Mental illness Sister        Social History     Socioeconomic History   • Marital status:    Tobacco Use   • Smoking status: Current Every Day Smoker     Packs/day: 1.00     Years: 4.00     Pack years: 4.00     Types: Cigarettes   • Smokeless tobacco: Never Used   Vaping Use   • Vaping Use: Former   •  Quit date: 3/28/2021   • Substances: Nicotine   • Devices: Disposable   Substance and Sexual Activity   • Alcohol use: Yes     Comment: rarely   • Drug use: Yes     Types: Marijuana     Comment: Marijuana use 1-2 a year.   • Sexual activity: Defer     Partners: Male     Birth control/protection: None           Objective   Physical Exam  Vitals and nursing note reviewed.   Constitutional:       Appearance: Normal appearance. She is normal weight.   HENT:      Head: Normocephalic and atraumatic.   Cardiovascular:      Rate and Rhythm: Normal rate and regular rhythm.      Pulses: Normal pulses.      Heart sounds: Normal heart sounds.   Pulmonary:      Effort: Pulmonary effort is normal.      Breath sounds: Normal breath sounds.   Abdominal:      General: Abdomen is flat. Bowel sounds are normal. There is no distension.      Palpations: Abdomen is soft.      Tenderness: There is no abdominal tenderness. There is right CVA tenderness and left CVA tenderness.   Musculoskeletal:      Right lower leg: No edema.      Left lower leg: No edema.   Skin:     General: Skin is warm and dry.      Capillary Refill: Capillary refill takes less than 2 seconds.   Neurological:      General: No focal deficit present.      Mental Status: She is alert and oriented to person, place, and time.   Psychiatric:         Mood and Affect: Mood normal.         Behavior: Behavior normal.         Procedures           ED Course  ED Course as of 07/26/22 1845   Tue Jul 26, 2022   1451 Glucose: Negative [TA]   1451 Ketones, UA: Negative [TA]   1451 Bilirubin, UA: Negative [TA]   1451 Blood, UA(!): Large (3+) [TA]   1451 Protein, UA: Negative [TA]   1451 Leukocytes, UA(!): Trace [TA]   1451 Nitrite, UA: Negative [TA]   1518 WBC(!): 17.28 [TA]   1638 Glucose: 85 [TA]   1639 BUN: 7 [TA]   1639 Creatinine: 0.66 [TA]   1639 Sodium: 137 [TA]   1639 Potassium: 3.8 [TA]   1639 eGFR: 125.0 [TA]   1702 RBC: 5.10 [TA]   1703 Hemoglobin: 14.2 [TA]   1703  Hematocrit: 45.1 [TA]   1703 Platelets: 294 [TA]   1809 CT Abdomen Pelvis With Contrast  FINDINGS:  LOWER CHEST: The heart is normal size.  The lung bases are  clear.  ABDOMEN/PELVIS:  Liver, gallbladder and bile ducts: The  liver enhances homogeneously without suspicious focal hepatic  lesion.  The gallbladder is unremarkable.  There is no definite  biliary duct dilatation.  Adrenal glands: The adrenal glands are  morphologically unremarkable without suspicious lesion.  Kidneys, ureter and urinary bladder: No suspicious renal lesion.  No hydronephrosis.  Urinary bladder is unremarkable.  Spleen:  The spleen is normal size.  Pancreas: The pancreas is grossly  unremarkable.  GI systems and mesentery: No evidence of bowel  obstruction.  The appendix is visualized and unremarkable in  appearance.  No significant mesenteric inflammation.  Lymph  nodes: No definite pathologically enlarged abdominal or pelvic  lymph nodes present.  Vessels: The aorta and abdominal arteries  are grossly patent.  The IVC and portal vein are patent and  grossly unremarkable.  Peritoneum: No free intraperitoneal fluid  or pneumoperitoneum.  Pelvic viscera: There is an IUD present.  Body wall: No body wall contusion. No significant body wall  hernias.  Bones: No acute fracture.     IMPRESSION:  No acute findings in the abdomen or pelvis to account for  patient's symptoms. [TA]      ED Course User Index  [TA] Ezra Puri, APRN                                           MDM  Number of Diagnoses or Management Options  Dysuria  Urinary urgency  Diagnosis management comments: Patient is a 27-year-old female here today with urinary symptoms.  She does not appear to be in acute distress vital signs are within normal limits.  Initially checked patient's urine which found 3+ blood and trace leukocytes.  Negative nitrites.  31-50 red blood cells noted on microscopy.  In comparison to her urinalysis from her visit to [facility] on her phone,  she has no presence of yeast.  Because of presence of blood and patient's complaint of flank pain, patient is agreeable to IV, labs, and CT abdomen pelvis with IV contrast.    Labs show a white blood count of 17.28 which is increased from the white blood count on her phone of 16.  CT scan is normal per radiologist report.  Based on patient's presence of blood in her urine and urinary urgency and dysuria, will treat patient for UTI.  Will provide a dose of ceftriaxone here in the emergency department and send patient home with Ceftin to take for 5 days.  Will also provide Pyridium to help with symptoms.  She states that her follow-up appointment with her OB/GYN after procedure was originally for today, but will reschedule.  Advised her to have her urine rechecked when she goes to her follow-up appointment.  Patient is agreeable to the plan of care and is ready for discharge.       Amount and/or Complexity of Data Reviewed  Clinical lab tests: reviewed and ordered  Tests in the radiology section of CPT®: ordered and reviewed  Tests in the medicine section of CPT®: ordered and reviewed  Discussion of test results with the performing providers: yes  Discuss the patient with other providers: yes    Patient Progress  Patient progress: stable      Final diagnoses:   Urinary urgency   Dysuria       ED Disposition  ED Disposition     ED Disposition   Discharge    Condition   Stable    Comment   --             Lorena Perrin PA  [address] [facility] [address] [phone]    Schedule an appointment as soon as possible for a visit   As needed         Medication List      New Prescriptions    cefuroxime 500 MG tablet  Commonly known as: CEFTIN  Take 1 tablet by mouth 2 (Two) Times a Day for 5 days.     phenazopyridine 100 MG tablet  Commonly known as: PYRIDIUM  Take 1 tablet by mouth 3 (Three) Times a Day As Needed for Bladder Spasms.        Changed    triamcinolone 0.1 % cream  Commonly known as: KENALOG  Apply   topically 2 (Two) Times a Day.  What changed: how much to take           Where to Get Your Medications      These medications were sent to [facility] [address] [place] [address] - [address] [place] [address] - [phone]  - [phone] FX  [address] [facility] [address]    Phone: [phone]   · cefuroxime 500 MG tablet  · phenazopyridine 100 MG tablet          Ezra Puri, APRN  07/26/22 4418

## 2022-07-26 NOTE — DISCHARGE INSTRUCTIONS
First dose of antibiotics provided in ER, start the oral antibiotics tomorrow. Also start the pyridium tomorrow. Follow-up with your primary provider as needed.

## 2022-07-27 LAB
ORGANISM: ABNORMAL
ORGANISM: ABNORMAL
URINE CULTURE, ROUTINE: ABNORMAL
URINE CULTURE, ROUTINE: ABNORMAL

## 2022-07-29 ENCOUNTER — OFFICE VISIT (OUTPATIENT)
Dept: PRIMARY CARE CLINIC | Age: 27
End: 2022-07-29
Payer: MEDICAID

## 2022-07-29 VITALS
HEART RATE: 74 BPM | HEIGHT: 65 IN | WEIGHT: 293 LBS | TEMPERATURE: 97.2 F | BODY MASS INDEX: 48.82 KG/M2 | DIASTOLIC BLOOD PRESSURE: 70 MMHG | OXYGEN SATURATION: 99 % | SYSTOLIC BLOOD PRESSURE: 122 MMHG

## 2022-07-29 DIAGNOSIS — F32.A ANXIETY AND DEPRESSION: ICD-10-CM

## 2022-07-29 DIAGNOSIS — N30.00 ACUTE CYSTITIS WITHOUT HEMATURIA: ICD-10-CM

## 2022-07-29 DIAGNOSIS — R30.0 DYSURIA: Primary | ICD-10-CM

## 2022-07-29 DIAGNOSIS — F41.9 ANXIETY AND DEPRESSION: ICD-10-CM

## 2022-07-29 LAB
BILIRUBIN, POC: NORMAL
BLOOD URINE, POC: NORMAL
CLARITY, POC: NORMAL
COLOR, POC: NORMAL
GLUCOSE URINE, POC: NORMAL
KETONES, POC: NORMAL
LEUKOCYTE EST, POC: NORMAL
NITRITE, POC: NORMAL
PH, POC: 6
PROTEIN, POC: NORMAL
SPECIFIC GRAVITY, POC: 1.02
UROBILINOGEN, POC: NORMAL

## 2022-07-29 PROCEDURE — G8417 CALC BMI ABV UP PARAM F/U: HCPCS | Performed by: PHYSICIAN ASSISTANT

## 2022-07-29 PROCEDURE — G8427 DOCREV CUR MEDS BY ELIG CLIN: HCPCS | Performed by: PHYSICIAN ASSISTANT

## 2022-07-29 PROCEDURE — 4004F PT TOBACCO SCREEN RCVD TLK: CPT | Performed by: PHYSICIAN ASSISTANT

## 2022-07-29 PROCEDURE — 99213 OFFICE O/P EST LOW 20 MIN: CPT | Performed by: PHYSICIAN ASSISTANT

## 2022-07-29 PROCEDURE — 81002 URINALYSIS NONAUTO W/O SCOPE: CPT | Performed by: PHYSICIAN ASSISTANT

## 2022-07-29 RX ORDER — TOPIRAMATE 50 MG/1
TABLET, FILM COATED ORAL
Qty: 30 TABLET | Refills: 5 | Status: SHIPPED | OUTPATIENT
Start: 2022-07-29

## 2022-07-29 RX ORDER — CEFUROXIME AXETIL 500 MG/1
TABLET ORAL
COMMUNITY
Start: 2022-07-26 | End: 2022-09-13

## 2022-07-29 RX ORDER — LEVONORGESTREL 52 MG/1
INTRAUTERINE DEVICE INTRAUTERINE
COMMUNITY
Start: 2022-07-13

## 2022-07-29 RX ORDER — FLUOXETINE HYDROCHLORIDE 20 MG/1
CAPSULE ORAL
Qty: 30 CAPSULE | Refills: 5 | Status: SHIPPED | OUTPATIENT
Start: 2022-07-29

## 2022-07-29 ASSESSMENT — ENCOUNTER SYMPTOMS
SHORTNESS OF BREATH: 0
COUGH: 0
EYE PAIN: 0
DIARRHEA: 0
ABDOMINAL PAIN: 0
CONSTIPATION: 0
SORE THROAT: 0

## 2022-07-29 NOTE — PROGRESS NOTES
Rikki Crawford (:  1995) is a 24 y.o. female,Established patient, here for evaluation of the following chief complaint(s):  Follow-up (From ED/) and Menorrhagia           Subjective   SUBJECTIVE/OBJECTIVE:  HPI  Ms. Sophia Morrison is a 24year old female who presents for follow up today. She had a D and C for endometrial hyperplasia and IUD placed on 7/15 by Dr. Cam Zelaya. She has since then had flank pain and urinary frequency and presented to 2 separate ED visits. She was shown to have vaginal yeast infection and UTI and was treated with IV antibiotics and po antibiotics and diflucan. She is still taking Antibiotics for 5 more days; she is unsure of the name. She states all her urinary symptoms are better and her vaginal bleeding has decreased. She has not followed up with OBGYN since her surgery. She denies any pelvic pain; vaginal discharge. Review of Systems   Constitutional:  Negative for chills, fatigue and fever. HENT:  Negative for congestion, ear pain, nosebleeds and sore throat. Eyes:  Negative for pain and visual disturbance. Respiratory:  Negative for cough and shortness of breath. Cardiovascular:  Negative for chest pain and palpitations. Gastrointestinal:  Negative for abdominal pain, constipation and diarrhea. Genitourinary:  Positive for vaginal bleeding. Negative for difficulty urinating, flank pain, pelvic pain, urgency, vaginal discharge and vaginal pain. Musculoskeletal:  Negative for arthralgias, gait problem and myalgias. Skin:  Negative for rash. Neurological:  Negative for syncope, light-headedness and headaches. Psychiatric/Behavioral:  Negative for behavioral problems, confusion and dysphoric mood.          Objective     Vitals:    22 1140   BP: 122/70   Site: Left Lower Arm   Position: Sitting   Pulse: 74   Temp: 97.2 °F (36.2 °C)   TempSrc: Temporal   SpO2: 99%   Weight: (!) 355 lb 6.4 oz (161.2 kg)   Height: 5' 5\" (1.651 m)     Physical Exam  Vitals reviewed. Constitutional:       Appearance: Normal appearance. HENT:      Head: Normocephalic and atraumatic. Right Ear: External ear normal.      Left Ear: External ear normal.      Nose: Nose normal.      Mouth/Throat:      Mouth: Mucous membranes are moist.      Pharynx: Oropharynx is clear. Eyes:      Extraocular Movements: Extraocular movements intact. Conjunctiva/sclera: Conjunctivae normal.      Pupils: Pupils are equal, round, and reactive to light. Cardiovascular:      Rate and Rhythm: Normal rate and regular rhythm. Pulses: Normal pulses. Heart sounds: Normal heart sounds. Pulmonary:      Effort: Pulmonary effort is normal.      Breath sounds: Normal breath sounds. Abdominal:      General: Bowel sounds are normal.      Palpations: Abdomen is soft. Musculoskeletal:         General: Normal range of motion. Cervical back: Normal range of motion. Skin:     General: Skin is warm. Findings: No rash. Neurological:      General: No focal deficit present. Mental Status: She is alert and oriented to person, place, and time. Psychiatric:         Mood and Affect: Mood normal.         Thought Content: Thought content normal.               ASSESSMENT/PLAN:  1. Dysuria  Results for POC orders placed in visit on 07/29/22   POCT Urinalysis no Micro   Result Value Ref Range    Color, UA Red     Clarity, UA Cloudy     Glucose, UA POC -     Bilirubin, UA -     Ketones, UA -     Spec Grav, UA 1.020     Blood, UA POC 3+     pH, UA 6.0     Protein, UA POC 2+     Urobilinogen, UA -     Leukocytes, UA 1+     Nitrite, UA -        - POCT Urinalysis no Micro  Complete antibiotics  2. Acute cystitis without hematuria  On antibiotics with s/sx resolving     3.  Anxiety and Depression  Refilled Topamax and Fluoxetine she lost Rx bottles she refilled last week     I have recommended she call her OB/GYN Dr. Jane Burgos office for a postop follow-up status post D&C with IUD placement. Return for 9/13 as scheduled . An electronic signature was used to authenticate this note.     --Daisy Greene PA-C

## 2022-07-29 NOTE — PROGRESS NOTES
Chief Complaint   Patient presents with    Follow-up     From ED      Menorrhagia       Have you seen any other physician or provider since your last visit yes - [facility] ED, [facility]    Have you had any other diagnostic tests since your last visit?  yes - labs, UA    Have you changed or stopped any medications since your last visit? no

## 2022-08-31 ENCOUNTER — OFFICE VISIT (OUTPATIENT)
Dept: OBSTETRICS AND GYNECOLOGY | Facility: CLINIC | Age: 27
End: 2022-08-31

## 2022-08-31 VITALS
BODY MASS INDEX: 48.82 KG/M2 | SYSTOLIC BLOOD PRESSURE: 120 MMHG | WEIGHT: 293 LBS | DIASTOLIC BLOOD PRESSURE: 68 MMHG | HEIGHT: 65 IN

## 2022-08-31 DIAGNOSIS — N85.01 SIMPLE ENDOMETRIAL HYPERPLASIA WITHOUT ATYPIA: ICD-10-CM

## 2022-08-31 DIAGNOSIS — Z09 POSTOPERATIVE FOLLOW-UP: Primary | ICD-10-CM

## 2022-08-31 PROCEDURE — 99024 POSTOP FOLLOW-UP VISIT: CPT | Performed by: PHYSICIAN ASSISTANT

## 2022-08-31 NOTE — PATIENT INSTRUCTIONS
Patient is advised that bleeding she has been experiencing should be tapering off completely and hopefully stopping within the next week or so.  She is advised that the Mirena IUD will be a good management for her simple endometrial hyperplasia.  She will follow-up in 3 months for annual exam and also do pelvic ultrasound to check endometrial thickness at that time.

## 2022-08-31 NOTE — PROGRESS NOTES
Subjective   Chief Complaint   Patient presents with   • Post-op     6 weeks post-op D&C Hysteroscopy with IUD insertion, patient states she has been bleeding since surgery       Gracie Clemente is a 27 y.o. year old  presenting to be seen for postop visit.  She is 6 weeks postop D&C hysteroscopy for menorrhagia with Mirena IUD insertion.  Patient has done well post D&C however she reports she has continued to have some bleeding though it has gotten lighter over the last several days.  The bleeding seems to have almost stopped today.  She had been changing pads twice daily over the last several days.  She does not want an exam to check her IUD strings today.  Her D&C pathology notes simple endometrial hyperplasia without atypia and a benign endometrial polyp.    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   • Visual disturbance 2021    Patient reported is supposed to wear Rx glasses but broke them and does not use        Current Outpatient Medications:   •  FLUoxetine (PROzac) 20 MG capsule, TAKE ONE CAPSULE BY MOUTH EVERY DAY (Patient taking differently: Take 20 mg by mouth Daily.), Disp: 30 capsule, Rfl: 5  •  ibuprofen (ADVIL,MOTRIN) 600 MG tablet, Take 1 tablet by mouth Every 6 (Six) Hours As Needed for Moderate Pain ., Disp: 30 tablet, Rfl: 1  •  Levonorgestrel (Mirena, 52 MG,) 20 MCG/DAY  intrauterine device IUD, 1 each by Intrauterine route., Disp: , Rfl:   •  topiramate (TOPAMAX) 50 MG tablet, Take 50 mg by mouth Daily., Disp: , Rfl:   •  triamcinolone (KENALOG) 0.1 % cream, Apply  topically 2 (Two) Times a Day. (Patient taking differently: Apply 1 application topically to the appropriate area as directed 2 (Two) Times a Day.), Disp: 45 g, Rfl: 6  •  Levonorgestrel (MIRENA) 20 MCG/DAY intrauterine device IUD, Take to provider's office, Disp: 1 each, Rfl: 0    Current Facility-Administered Medications:   •  Levonorgestrel (MIRENA) 20 MCG/DAY IUD intrauterine device 1 each, 1 each, Intrauterine, Once, Yasir Lenz MD   Allergies   Allergen Reactions   • Bee Venom Anaphylaxis   • Sertraline Hcl Other (See Comments)     Nightmares      Past Surgical History:   Procedure Laterality Date   • COLONOSCOPY N/A 05/08/2019    Procedure: COLONOSCOPY;  Surgeon: Alex Bartholomew MD;  Location: [facility] ENDOSCOPY;  Service: Gastroenterology   • D & C HYSTEROSCOPY N/A 02/11/2020    Procedure: DILATATION AND CURETTAGE DIAGNOSTIC HYSTEROSCOPY;  Surgeon: Niya Sun MD;  Location: [facility] OR;  Service: Obstetrics/Gynecology;  Laterality: N/A;   • D & C HYSTEROSCOPY N/A 10/05/2021    Procedure: DILATATION AND CURETTAGE, DIAGNOSTIC HYSTEROSCOPY;  Surgeon: Niya Sun MD;  Location: [facility] OR;  Service: Obstetrics/Gynecology;  Laterality: N/A;   • D & C HYSTEROSCOPY N/A 07/15/2022    Procedure: DILATATION AND CURETTAGE HYSTEROSCOPY, Mirena IUD insertion;  Surgeon: Yasir Lenz MD;  Location: [facility] OR;  Service: Obstetrics/Gynecology;  Laterality: N/A;   • D & C WITH SUCTION  07/13/2022   • DILATATION AND CURETTAGE     • ENDOSCOPY     • HYSTEROSCOPY     • SKIN BIOPSY Bilateral     breasts   • WISDOM TOOTH EXTRACTION        Social History     Socioeconomic History   • Marital status:    Tobacco Use   • Smoking status: Current Every Day Smoker     Packs/day: 1.00     Years: 2.00      "Pack years: 2.00     Types: Cigarettes   • Smokeless tobacco: Never Used   Vaping Use   • Vaping Use: Former   • Quit date: 3/28/2021   • Substances: Nicotine   • Devices: Disposable   Substance and Sexual Activity   • Alcohol use: Yes     Comment: rarely   • Drug use: Yes     Types: Marijuana     Comment: Marijuana use 1-2 a year.   • Sexual activity: Yes     Partners: Male     Birth control/protection: I.U.D.      Family History   Problem Relation Age of Onset   • Depression Mother    • Diabetes Mother    • Hyperlipidemia Mother    • Hypertension Mother    • Arthritis Mother    • Mental illness Mother    • Obesity Mother    • Arthritis Father    • COPD Father    • Depression Father    • Heart disease Father    • Hypertension Father    • Hyperlipidemia Father    • Stroke Father    • Obesity Father    • Mental illness Father    • Heart attack Father    • Depression Maternal Aunt    • Diabetes Maternal Aunt    • Hypertension Maternal Aunt    • Mental illness Maternal Aunt    • Obesity Maternal Aunt    • Cancer Paternal Aunt    • Breast cancer Paternal Aunt    • Diabetes Other         Grandmother    • Mental illness Sister    • Mental illness Sister    • Melanoma Maternal Grandmother    • Diabetes Maternal Grandmother    • Stroke Paternal Grandmother        Review of Systems   Constitutional: Negative for fever.   Gastrointestinal: Positive for diarrhea and nausea. Negative for constipation and vomiting.   Genitourinary: Positive for hematuria and vaginal bleeding. Negative for dysuria and frequency.   Musculoskeletal: Negative for arthralgias and myalgias.   Neurological: Negative for headaches.           Objective   /68   Ht 165.1 cm (65\")   Wt (!) 164 kg (362 lb)   Breastfeeding No   BMI 60.24 kg/m²     Physical Exam  Constitutional:       Appearance: Normal appearance. She is well-developed and well-groomed.   Eyes:      General: Lids are normal.      Extraocular Movements: Extraocular movements intact. "      Conjunctiva/sclera: Conjunctivae normal.   Skin:     General: Skin is warm and dry.      Findings: No bruising or lesion.   Neurological:      Mental Status: She is alert.   Psychiatric:         Attention and Perception: Attention normal.         Mood and Affect: Mood normal.         Speech: Speech normal.         Behavior: Behavior is cooperative.            Result Review :                   Assessment and Plan  Diagnoses and all orders for this visit:    1. Postoperative follow-up (Primary)    2. Simple endometrial hyperplasia without atypia      Patient Instructions   Patient is advised that bleeding she has been experiencing should be tapering off completely and hopefully stopping within the next week or so.  She is advised that the Mirena IUD will be a good management for her simple endometrial hyperplasia.  She will follow-up in 3 months for annual exam and also do pelvic ultrasound to check endometrial thickness at that time.             This note was electronically signed.    Emily Rodrigues PA-C   August 31, 2022

## 2022-09-13 ENCOUNTER — OFFICE VISIT (OUTPATIENT)
Dept: PRIMARY CARE CLINIC | Age: 27
End: 2022-09-13
Payer: MEDICAID

## 2022-09-13 VITALS
OXYGEN SATURATION: 99 % | BODY MASS INDEX: 48.82 KG/M2 | HEART RATE: 85 BPM | WEIGHT: 293 LBS | SYSTOLIC BLOOD PRESSURE: 127 MMHG | DIASTOLIC BLOOD PRESSURE: 86 MMHG | HEIGHT: 65 IN

## 2022-09-13 DIAGNOSIS — Z87.891 PERSONAL HISTORY OF TOBACCO USE: ICD-10-CM

## 2022-09-13 DIAGNOSIS — R05.9 COUGH: ICD-10-CM

## 2022-09-13 DIAGNOSIS — F32.4 MAJOR DEPRESSIVE DISORDER WITH SINGLE EPISODE, IN PARTIAL REMISSION (HCC): Primary | ICD-10-CM

## 2022-09-13 PROCEDURE — G8427 DOCREV CUR MEDS BY ELIG CLIN: HCPCS | Performed by: PHYSICIAN ASSISTANT

## 2022-09-13 PROCEDURE — G8417 CALC BMI ABV UP PARAM F/U: HCPCS | Performed by: PHYSICIAN ASSISTANT

## 2022-09-13 PROCEDURE — 99213 OFFICE O/P EST LOW 20 MIN: CPT | Performed by: PHYSICIAN ASSISTANT

## 2022-09-13 PROCEDURE — 99406 BEHAV CHNG SMOKING 3-10 MIN: CPT | Performed by: PHYSICIAN ASSISTANT

## 2022-09-13 PROCEDURE — 4004F PT TOBACCO SCREEN RCVD TLK: CPT | Performed by: PHYSICIAN ASSISTANT

## 2022-09-13 RX ORDER — ALBUTEROL SULFATE 90 UG/1
2 AEROSOL, METERED RESPIRATORY (INHALATION) 4 TIMES DAILY PRN
Qty: 54 G | Refills: 1 | Status: SHIPPED | OUTPATIENT
Start: 2022-09-13

## 2022-09-13 ASSESSMENT — ENCOUNTER SYMPTOMS
SORE THROAT: 0
COUGH: 1
WHEEZING: 1
ABDOMINAL PAIN: 0
CONSTIPATION: 0
SHORTNESS OF BREATH: 0
DIARRHEA: 0
EYE PAIN: 0

## 2022-09-13 NOTE — PROGRESS NOTES
SUBJECTIVE:    Patient ID: Tr Peraza is a 24 y. o.female. Chief Complaint   Patient presents with    Follow-up    Anxiety    Depression    Shortness of Breath         HPI:  Ms. Billy Guzman is a 24year old female who presents for follow up today for depression. She feels stable on the topamax and prozac and was initially put on those per Psych 8 years ago. She complains of dry cough and wheezing at times. She continues to smoke 1/2 ppd for 5 years. Patient's medications, allergies, past medical, surgical, social and family histories were reviewed and updated as appropriate in electronic medical record. Outpatient Medications Marked as Taking for the 9/13/22 encounter (Office Visit) with Stalin Marques PA-C   Medication Sig Dispense Refill    albuterol sulfate HFA (VENTOLIN HFA) 108 (90 Base) MCG/ACT inhaler Inhale 2 puffs into the lungs 4 times daily as needed for Wheezing 54 g 1    MIRENA, 52 MG, IUD 52 mg       FLUoxetine (PROZAC) 20 MG capsule Take 1 capsule by mouth once daily 30 capsule 5    topiramate (TOPAMAX) 50 MG tablet Take one tablet daily 30 tablet 5    ondansetron (ZOFRAN) 4 MG tablet Take 2 tablets by mouth every 8 hours as needed for Nausea or Vomiting 15 tablet 0    fluticasone (FLONASE) 50 MCG/ACT nasal spray 1 spray by Each Nostril route daily 32 g 1        Review of Systems   Constitutional:  Negative for chills, fatigue and fever. HENT:  Negative for congestion, ear pain, nosebleeds and sore throat. Eyes:  Negative for pain and visual disturbance. Respiratory:  Positive for cough and wheezing. Negative for shortness of breath. Cardiovascular:  Negative for chest pain and palpitations. Gastrointestinal:  Negative for abdominal pain, constipation and diarrhea. Genitourinary:  Negative for difficulty urinating and flank pain. Musculoskeletal:  Negative for arthralgias, gait problem and myalgias. Skin:  Negative for rash.    Neurological:  Negative for syncope, light-headedness and headaches. Psychiatric/Behavioral:  Negative for behavioral problems, confusion and dysphoric mood. Past Medical History:   Diagnosis Date    Anemia     Anxiety     Borderline personality disorder (HCC)     Depression     GERD (gastroesophageal reflux disease)     Headache     Hypothyroidism     Obesity     OCD (obsessive compulsive disorder)     PCOS (polycystic ovarian syndrome)     Trichotillomania      Past Surgical History:   Procedure Laterality Date    WISDOM TOOTH EXTRACTION       Family History   Problem Relation Age of Onset    Asthma Mother     Diabetes Mother     Depression Mother     Asthma Father     High Blood Pressure Father     Alcohol Abuse Father     Stroke Father     Depression Sister     Diabetes Maternal Grandmother     Depression Maternal Grandmother     High Blood Pressure Maternal Grandmother     Diabetes Maternal Grandfather     High Blood Pressure Maternal Grandfather     Stroke Maternal Grandfather     Diabetes Paternal Grandmother     Cancer Paternal Grandfather         lung    Depression Paternal Aunt     Cancer Paternal Aunt         lung      Social History     Tobacco Use   Smoking Status Every Day    Packs/day: 1.00    Years: 2.00    Pack years: 2.00    Types: Cigarettes    Last attempt to quit: 12/2018    Years since quitting: 3.7   Smokeless Tobacco Never       OBJECTIVE:   Wt Readings from Last 3 Encounters:   09/13/22 (!) 366 lb (166 kg)   07/29/22 (!) 355 lb 6.4 oz (161.2 kg)   07/24/22 (!) 358 lb (162.4 kg)     BP Readings from Last 3 Encounters:   09/13/22 127/86   07/29/22 122/70   07/24/22 113/84       /86 (Site: Left Lower Arm, Position: Sitting)   Pulse 85   Ht 5' 5\" (1.651 m)   Wt (!) 366 lb (166 kg)   SpO2 99%   BMI 60.91 kg/m²      Physical Exam  Vitals reviewed. Constitutional:       Appearance: Normal appearance. HENT:      Head: Normocephalic and atraumatic.       Right Ear: Tympanic membrane, ear canal and 04:58 PM       Lab Results   Component Value Date    TSH 2.06 03/29/2022         ASSESSMENT/PLAN:     1. Major depressive disorder with single episode, in partial remission (HCC)  Stable on Prozac and Topamax    2. Cough  - albuterol sulfate HFA (VENTOLIN HFA) 108 (90 Base) MCG/ACT inhaler; Inhale 2 puffs into the lungs 4 times daily as needed for Wheezing  Dispense: 54 g; Refill: 1    3. Personal history of tobacco use  I counseled patient on importance of smoking cessation for 10 minutes and the resources that are available to aide in cessation. The discussion included the negative health effects of tobacco including but not limited to increased risk of lung cancer, heart disease, peripheral arterial disease and stroke       Orders Placed This Encounter   Medications    albuterol sulfate HFA (VENTOLIN HFA) 108 (90 Base) MCG/ACT inhaler     Sig: Inhale 2 puffs into the lungs 4 times daily as needed for Wheezing     Dispense:  54 g     Refill:  1

## 2022-09-13 NOTE — PROGRESS NOTES
Chief Complaint   Patient presents with    Follow-up    Anxiety    Depression    Shortness of Breath       Have you seen any other physician or provider since your last visit no    Have you had any other diagnostic tests since your last visit? no    Have you changed or stopped any medications since your last visit? no

## 2022-11-03 ENCOUNTER — HOSPITAL ENCOUNTER (EMERGENCY)
Facility: HOSPITAL | Age: 27
Discharge: HOME | End: 2022-11-03
Attending: FAMILY MEDICINE
Payer: MEDICAID

## 2022-11-03 VITALS
RESPIRATION RATE: 16 BRPM | WEIGHT: 293 LBS | OXYGEN SATURATION: 98 % | TEMPERATURE: 97.9 F | SYSTOLIC BLOOD PRESSURE: 132 MMHG | HEART RATE: 85 BPM | BODY MASS INDEX: 48.82 KG/M2 | HEIGHT: 65 IN | DIASTOLIC BLOOD PRESSURE: 64 MMHG

## 2022-11-03 DIAGNOSIS — R10.84 GENERALIZED ABDOMINAL PAIN: ICD-10-CM

## 2022-11-03 DIAGNOSIS — N93.9 VAGINAL BLEEDING: Primary | ICD-10-CM

## 2022-11-03 PROCEDURE — 6370000000 HC RX 637 (ALT 250 FOR IP): Performed by: FAMILY MEDICINE

## 2022-11-03 PROCEDURE — 99283 EMERGENCY DEPT VISIT LOW MDM: CPT

## 2022-11-03 RX ORDER — IBUPROFEN 800 MG/1
800 TABLET ORAL ONCE
Status: COMPLETED | OUTPATIENT
Start: 2022-11-03 | End: 2022-11-03

## 2022-11-03 RX ADMIN — IBUPROFEN 800 MG: 800 TABLET, FILM COATED ORAL at 22:46

## 2022-11-03 ASSESSMENT — PAIN DESCRIPTION - LOCATION: LOCATION: ABDOMEN

## 2022-11-03 ASSESSMENT — PAIN SCALES - GENERAL: PAINLEVEL_OUTOF10: 5

## 2022-11-03 ASSESSMENT — ENCOUNTER SYMPTOMS: ABDOMINAL PAIN: 1

## 2022-11-03 NOTE — Clinical Note
Rosette Bender was seen and treated in our emergency department on 11/3/2022. She may return to work on 11/04/2022. Pt needs to be able to leave early from work tomorrow around 2:30 for Radiology ultrasound appt at 3 pm Friday 11/5/22     If you have any questions or concerns, please don't hesitate to call.       Dolores Ramires, DO

## 2022-11-04 ENCOUNTER — HOSPITAL ENCOUNTER (OUTPATIENT)
Dept: ULTRASOUND IMAGING | Facility: HOSPITAL | Age: 27
Discharge: HOME | End: 2022-11-04
Payer: MEDICAID

## 2022-11-04 DIAGNOSIS — Z30.431 INTRAUTERINE DEVICE SURVEILLANCE: ICD-10-CM

## 2022-11-04 DIAGNOSIS — N93.9 VAGINAL BLEEDING: ICD-10-CM

## 2022-11-04 PROCEDURE — 76830 TRANSVAGINAL US NON-OB: CPT

## 2022-11-04 NOTE — ED TRIAGE NOTES
Patient arrived to ER with chief complaint of ABD pain and Vaginal Bleeding. Patient had an IUD placed in July. Patient called OB today and they told her to report to the ER if the pain did not improve because the IUD could be dislodged.

## 2022-11-04 NOTE — ED PROVIDER NOTES
[address] [place] [address] Court  eMERGENCY dEPARTMENT eNCOUnter      Pt Name: Rachell Ibrahim  MRN: [id number]  [place] 1995  Date of evaluation: 11/3/2022  Provider: Bushra Rob, [address] [place] [address]       Chief Complaint   Patient presents with    Other     Patient states she had IUD placed in July      Abdominal Pain    Vaginal Bleeding         HISTORY OF PRESENT ILLNESS   (Location/Symptom, Timing/Onset, Context/Setting, Quality, Duration, Modifying Factors, Severity)  Note limiting factors. Rachell Ibrahim is a 24 y.o. female who presents to the emergency department for abdominal pain and vaginal bleeding. Pt had IUD placed in July by Dr. Ryley John in [facility]. Placed for continued endometrial thickening. Pt states that she stopped bleeding for 3 days and then started spotting immediately after surgery and then bleeding after that. She states that she has basically had a full blown period for the past 3 1/2 months. She has seen OB in the past about this and they told her that it was normal. Pt didn't have any abdominal pain until they placed the Mirena. Pt called today and concerned and OB told her if she was concerned to go to ER for placement. No fever. No dysuria; No flank pain. No vaginal discharge. Nursing Notes were reviewed. REVIEW OF SYSTEMS    (2-9 systems for level 4, 10 or more forlevel 5)     Review of Systems   Gastrointestinal:  Positive for abdominal pain. Genitourinary:  Positive for menstrual problem and vaginal bleeding. All other systems reviewed and are negative.         PAST MEDICAL HISTORY     Past Medical History:   Diagnosis Date    Anemia     Anxiety     Borderline personality disorder (HCC)     Depression     GERD (gastroesophageal reflux disease)     Headache     Hypothyroidism     Obesity     OCD (obsessive compulsive disorder)     PCOS (polycystic ovarian syndrome)     Trichotillomania          SURGICAL HISTORY       Past Surgical History:   Procedure Laterality Date    DILATION AND CURETTAGE OF UTERUS      WISDOM TOOTH EXTRACTION           CURRENT MEDICATIONS       Discharge Medication List as of 11/3/2022 11:13 PM        CONTINUE these medications which have NOT CHANGED    Details   albuterol sulfate HFA (VENTOLIN HFA) 108 (90 Base) MCG/ACT inhaler Inhale 2 puffs into the lungs 4 times daily as needed for Wheezing, Disp-54 g, R-1Normal      MIRENA, 52 MG, IUD 52 mg DAWHistorical Med      FLUoxetine (PROZAC) 20 MG capsule Take 1 capsule by mouth once daily, Disp-30 capsule, R-5Normal      topiramate (TOPAMAX) 50 MG tablet Take one tablet daily, Disp-30 tablet, R-5Normal             ALLERGIES     Bee venom, Sertraline, and Zoloft    FAMILY HISTORY       Family History   Problem Relation Age of Onset    Asthma Mother     Diabetes Mother     Depression Mother     Asthma Father     High Blood Pressure Father     Alcohol Abuse Father     Stroke Father     Depression Sister     Diabetes Maternal Grandmother     Depression Maternal Grandmother     High Blood Pressure Maternal Grandmother     Diabetes Maternal Grandfather     High Blood Pressure Maternal Grandfather     Stroke Maternal Grandfather     Diabetes Paternal Grandmother     Cancer Paternal Grandfather         lung    Depression Paternal Aunt     Cancer Paternal Aunt         lung          SOCIAL HISTORY       Social History     Socioeconomic History    Marital status:      Spouse name: None    Number of children: None    Years of education: None    Highest education level: None   Tobacco Use    Smoking status: Every Day     Packs/day: 0.50     Years: 5.00     Pack years: 2.50     Types: Cigarettes     Last attempt to quit: 12/2018     Years since quitting: 3.9    Smokeless tobacco: Never   Vaping Use    Vaping Use: Former    Substances: Nicotine, Flavoring    Devices: Refillable tank    Passive vaping exposure: Yes   Substance and Sexual Activity    Alcohol use: Yes     Comment: Rarely    Drug use: Not Currently    Sexual activity: Yes     Partners: Male     Social Determinants of Health     Financial Resource Strain: Low Risk     Difficulty of Paying Living Expenses: Not hard at all   Food Insecurity: No Food Insecurity    Worried About Running Out of Food in the Last Year: Never true    Ran Out of Food in the Last Year: Never true   Physical Activity: Unknown    Days of Exercise per Week: 0 days   Intimate Partner Violence: Not At Risk    Fear of Current or Ex-Partner: No    Emotionally Abused: No    Physically Abused: No    Sexually Abused: No       SCREENINGS    Janie Coma Scale  Eye Opening: Spontaneous  Best Verbal Response: Oriented  Best Motor Response: Obeys commands  [place] Coma Scale Score: 15        PHYSICAL EXAM    (up to 7 for level 4, 8 or more for level 5)     ED Triage Vitals [11/03/22 2111]   BP Temp Temp Source Heart Rate Resp SpO2 Height Weight   132/64 97.9 °F (36.6 °C) Oral 85 16 98 % 5' 5\" (1.651 m) (!) 360 lb (163.3 kg)       Physical Exam  Vitals and nursing note reviewed. Constitutional:       General: She is not in acute distress. Appearance: She is obese. HENT:      Head: Normocephalic. Eyes:      Extraocular Movements: Extraocular movements intact. Pupils: Pupils are equal, round, and reactive to light. Cardiovascular:      Rate and Rhythm: Normal rate and regular rhythm. Heart sounds: Normal heart sounds. Pulmonary:      Effort: Pulmonary effort is normal.      Breath sounds: Normal breath sounds. Abdominal:      General: Bowel sounds are normal.      Palpations: Abdomen is soft. Tenderness: There is generalized abdominal tenderness. There is no right CVA tenderness or left CVA tenderness. Skin:     General: Skin is warm and dry. Neurological:      Mental Status: She is alert and oriented to person, place, and time.    Psychiatric:         Mood and Affect: Mood normal.         Behavior: Behavior normal.       DIAGNOSTIC RESULTS     EKG: All EKG's are interpreted by the Emergency Department Physician who either signs or Co-signs this chart in the absence of a cardiologist.    None    RADIOLOGY:   Non-plain film images such as CT, Ultrasound and MRI are read by the radiologist. Plain radiographic images are visualized andpreliminarily interpreted by the emergency physician with the below findings:    Offered CT abd/pelvis as I cannot order transvaginal ultrasound. Pt didn't want to have that done and has appt to have ultrasound at end of month but will set up for ultrasound tomorrow. Interpretationper the Radiologist below, if available at the time of this note:    No orders to display         ED BEDSIDE ULTRASOUND:   Performed by ED Physician - none    LABS:  Labs Reviewed - No data to display    All other labs were within normal range or not returned as of this dictation. EMERGENCY DEPARTMENT COURSE and DIFFERENTIAL DIAGNOSIS/MDM:   :    Vitals:    11/03/22 2111   BP: 132/64   Pulse: 85   Resp: 16   Temp: 97.9 °F (36.6 °C)   TempSrc: Oral   SpO2: 98%   Weight: (!) 360 lb (163.3 kg)   Height: 5' 5\" (1.651 m)           CRITICAL CARE TIME   Total Critical Care time was 0 minutes, excluding separatelyreportable procedures. There was a high probability ofclinically significant/life threatening deterioration in the patient's condition which required my urgent intervention. CONSULTS:  None    PROCEDURES:  None    PROGRESS NOTES:    Pt with vaginal bleeding and abdominal pain for past 3 1/2 months. Pt has been having that happen since having the IUD placed. She has seen OB and had ultrasounds that verified placement 6 weeks post placement. Discussed about obtaining CBC and CT abd/pelvis; Pt said she isn't anemic as it has been checked. Discussed about CT since I cannot get ultrasound this late in evening. Set up patient to have ultrasound done tomorrow for placement and will send results to Dr. Mendel Malay.     Is this patient to be included in the SEP-1 Core Measure due to severe sepsis or septic shock? No   Exclusion criteria - the patient is NOT to be included for SEP-1 Core Measure due to: Infection is not suspected     FINAL IMPRESSION      1. Vaginal bleeding New Problem   2. Generalized abdominal pain New Problem         DISPOSITION/PLAN   DISPOSITION Decision To Discharge 11/03/2022 11:11:35 PM      PATIENT REFERRED TO:    Pt to return tomorrow for ultrasound testing. Pt to follow up with Dr. Cam Zelaya as discussed. Work note given.         DISCHARGE MEDICATIONS:  Discharge Medication List as of 11/3/2022 11:13 PM          (Please note that portions of this note were completed with a voice recognition program.  Efforts were made to edit the dictations but occasionallywords are mis-transcribed.)    Elizabeth Ignacio DO (electronically signed)  Attending Emergency Physician          Elizabeth Ignacio DO  11/03/22 4561

## 2022-12-08 ENCOUNTER — OFFICE VISIT (OUTPATIENT)
Dept: PRIMARY CARE CLINIC | Age: 27
End: 2022-12-08
Payer: MEDICAID

## 2022-12-08 VITALS
DIASTOLIC BLOOD PRESSURE: 80 MMHG | HEART RATE: 84 BPM | OXYGEN SATURATION: 98 % | SYSTOLIC BLOOD PRESSURE: 128 MMHG | WEIGHT: 293 LBS | HEIGHT: 65 IN | BODY MASS INDEX: 48.82 KG/M2

## 2022-12-08 DIAGNOSIS — G43.909 EPISODIC MIGRAINE: Primary | ICD-10-CM

## 2022-12-08 DIAGNOSIS — F41.9 ANXIETY AND DEPRESSION: ICD-10-CM

## 2022-12-08 DIAGNOSIS — Z23 NEED FOR INFLUENZA VACCINATION: ICD-10-CM

## 2022-12-08 DIAGNOSIS — F32.A ANXIETY AND DEPRESSION: ICD-10-CM

## 2022-12-08 PROCEDURE — G8427 DOCREV CUR MEDS BY ELIG CLIN: HCPCS | Performed by: PHYSICIAN ASSISTANT

## 2022-12-08 PROCEDURE — 90686 IIV4 VACC NO PRSV 0.5 ML IM: CPT | Performed by: PHYSICIAN ASSISTANT

## 2022-12-08 PROCEDURE — 99213 OFFICE O/P EST LOW 20 MIN: CPT | Performed by: PHYSICIAN ASSISTANT

## 2022-12-08 PROCEDURE — 90471 IMMUNIZATION ADMIN: CPT | Performed by: PHYSICIAN ASSISTANT

## 2022-12-08 PROCEDURE — 4004F PT TOBACCO SCREEN RCVD TLK: CPT | Performed by: PHYSICIAN ASSISTANT

## 2022-12-08 PROCEDURE — G8417 CALC BMI ABV UP PARAM F/U: HCPCS | Performed by: PHYSICIAN ASSISTANT

## 2022-12-08 PROCEDURE — G8482 FLU IMMUNIZE ORDER/ADMIN: HCPCS | Performed by: PHYSICIAN ASSISTANT

## 2022-12-08 RX ORDER — SUMATRIPTAN 100 MG/1
100 TABLET, FILM COATED ORAL
Qty: 9 TABLET | Refills: 5 | Status: SHIPPED | OUTPATIENT
Start: 2022-12-08 | End: 2022-12-08

## 2022-12-08 RX ORDER — TOPIRAMATE 50 MG/1
50 TABLET, FILM COATED ORAL 2 TIMES DAILY
Qty: 60 TABLET | Refills: 2 | Status: SHIPPED | OUTPATIENT
Start: 2022-12-08 | End: 2023-01-07

## 2022-12-08 ASSESSMENT — ENCOUNTER SYMPTOMS
SHORTNESS OF BREATH: 0
ABDOMINAL PAIN: 0
COUGH: 0
CONSTIPATION: 0
DIARRHEA: 0
SORE THROAT: 0
EYE PAIN: 0

## 2022-12-08 NOTE — PROGRESS NOTES
Jnoathon Dacosta (:  1995) is a 24 y.o. female,Established patient, here for evaluation of the following chief complaint(s):  Headache           Subjective   SUBJECTIVE/OBJECTIVE:  HPI  Ms. Rich Jiménez is a 24year old female with PMH depression who presents today with complaints of headache over the past week. She locates to left postorbital and ice pick type headache behind right eye at times. She has a h/o migraines dating back to the age of 17 and had brain MRI and workup she reports negative. She has an IUD Mirena. She is on Topamax 50 mg once a day already for mood stabilization. She denies any nausea or vomiting; she does have phonophobia and photophobia. She has not tried any OTC medications to relieve headache. She reports one bad headache this month lasting 6 days. Review of Systems   Constitutional:  Negative for chills, fatigue and fever. HENT:  Negative for congestion, ear pain, nosebleeds and sore throat. Eyes:  Negative for pain and visual disturbance. Respiratory:  Negative for cough and shortness of breath. Cardiovascular:  Negative for chest pain and palpitations. Gastrointestinal:  Negative for abdominal pain, constipation and diarrhea. Genitourinary:  Negative for difficulty urinating and flank pain. Musculoskeletal:  Negative for arthralgias, gait problem and myalgias. Skin:  Negative for rash. Neurological:  Positive for headaches. Negative for syncope and light-headedness. Psychiatric/Behavioral:  Negative for behavioral problems, confusion and dysphoric mood. Objective     Vitals:    22 1059   BP: 128/80   Site: Right Lower Arm   Position: Sitting   Pulse: 84   SpO2: 98%   Weight: (!) 356 lb 3.2 oz (161.6 kg)   Height: 5' 5\" (1.651 m)     Physical Exam  Vitals reviewed. Constitutional:       Appearance: Normal appearance. HENT:      Head: Normocephalic and atraumatic.       Right Ear: Tympanic membrane, ear canal and external ear normal.      Left Ear: Tympanic membrane, ear canal and external ear normal.      Nose: Nose normal.      Mouth/Throat:      Mouth: Mucous membranes are moist.      Pharynx: Oropharynx is clear. Eyes:      Extraocular Movements: Extraocular movements intact. Conjunctiva/sclera: Conjunctivae normal.      Pupils: Pupils are equal, round, and reactive to light. Neck:      Vascular: No carotid bruit. Cardiovascular:      Rate and Rhythm: Normal rate and regular rhythm. Pulses: Normal pulses. Heart sounds: Normal heart sounds. Pulmonary:      Effort: Pulmonary effort is normal.      Breath sounds: Normal breath sounds. Abdominal:      General: Bowel sounds are normal.      Palpations: Abdomen is soft. Musculoskeletal:         General: Normal range of motion. Cervical back: Normal range of motion. Skin:     General: Skin is warm. Findings: No rash. Neurological:      General: No focal deficit present. Mental Status: She is alert and oriented to person, place, and time. Cranial Nerves: Cranial nerves 2-12 are intact. Sensory: Sensation is intact. Motor: Motor function is intact. Coordination: Coordination is intact. Gait: Gait is intact. Psychiatric:         Mood and Affect: Mood normal.         Thought Content: Thought content normal.               ASSESSMENT/PLAN:  1. Episodic migraine  I will increase Topamax due to ice pick type headache for prevention. I have given her Imitrex for as needed usage and detailed all possible side effects. I advise she take 50 mg the first time she takes it. She can also try caffeine at onset of headache.  - topiramate (TOPAMAX) 50 MG tablet; Take 1 tablet by mouth 2 times daily Take one tablet daily  Dispense: 60 tablet; Refill: 2  - SUMAtriptan (IMITREX) 100 MG tablet; Take 1 tablet by mouth once as needed for Migraine  Dispense: 9 tablet; Refill: 5    2.  Anxiety and depression  Stable on Prozac and Topamax    3. Need for influenza vaccination  Flu vaccine given today      Return in about 4 weeks (around 1/5/2023). An electronic signature was used to authenticate this note.     --Mukesh Champagne PA-C

## 2022-12-08 NOTE — PROGRESS NOTES
Chief Complaint   Patient presents with    Headache     X 1 week     Have you seen any other physician or provider since your last visit no    Have you had any other diagnostic tests since your last visit? no    Have you changed or stopped any medications since your last visit? no

## 2022-12-17 DIAGNOSIS — F32.A ANXIETY AND DEPRESSION: ICD-10-CM

## 2022-12-17 DIAGNOSIS — F41.9 ANXIETY AND DEPRESSION: ICD-10-CM

## 2022-12-19 RX ORDER — FLUOXETINE HYDROCHLORIDE 20 MG/1
CAPSULE ORAL
Qty: 30 CAPSULE | Refills: 3 | OUTPATIENT
Start: 2022-12-19

## 2023-03-03 ENCOUNTER — TELEMEDICINE (OUTPATIENT)
Dept: PRIMARY CARE CLINIC | Age: 28
End: 2023-03-03
Payer: MEDICAID

## 2023-03-03 DIAGNOSIS — J06.9 ACUTE UPPER RESPIRATORY INFECTION: Primary | ICD-10-CM

## 2023-03-03 PROCEDURE — G8427 DOCREV CUR MEDS BY ELIG CLIN: HCPCS | Performed by: PHYSICIAN ASSISTANT

## 2023-03-03 PROCEDURE — G8417 CALC BMI ABV UP PARAM F/U: HCPCS | Performed by: PHYSICIAN ASSISTANT

## 2023-03-03 PROCEDURE — 99213 OFFICE O/P EST LOW 20 MIN: CPT | Performed by: PHYSICIAN ASSISTANT

## 2023-03-03 PROCEDURE — 4004F PT TOBACCO SCREEN RCVD TLK: CPT | Performed by: PHYSICIAN ASSISTANT

## 2023-03-03 PROCEDURE — G8482 FLU IMMUNIZE ORDER/ADMIN: HCPCS | Performed by: PHYSICIAN ASSISTANT

## 2023-03-03 RX ORDER — FLUCONAZOLE 150 MG/1
150 TABLET ORAL
Qty: 2 TABLET | Refills: 0 | Status: SHIPPED | OUTPATIENT
Start: 2023-03-03 | End: 2023-03-09

## 2023-03-03 RX ORDER — AMOXICILLIN AND CLAVULANATE POTASSIUM 875; 125 MG/1; MG/1
1 TABLET, FILM COATED ORAL 2 TIMES DAILY
Qty: 20 TABLET | Refills: 0 | Status: SHIPPED | OUTPATIENT
Start: 2023-03-03 | End: 2023-03-13

## 2023-03-03 RX ORDER — IBUPROFEN 600 MG/1
600 TABLET ORAL 3 TIMES DAILY PRN
Qty: 90 TABLET | Refills: 0 | Status: SHIPPED | OUTPATIENT
Start: 2023-03-03

## 2023-03-03 SDOH — ECONOMIC STABILITY: FOOD INSECURITY: WITHIN THE PAST 12 MONTHS, YOU WORRIED THAT YOUR FOOD WOULD RUN OUT BEFORE YOU GOT MONEY TO BUY MORE.: NEVER TRUE

## 2023-03-03 SDOH — ECONOMIC STABILITY: HOUSING INSECURITY
IN THE LAST 12 MONTHS, WAS THERE A TIME WHEN YOU DID NOT HAVE A STEADY PLACE TO SLEEP OR SLEPT IN A SHELTER (INCLUDING NOW)?: NO

## 2023-03-03 SDOH — ECONOMIC STABILITY: INCOME INSECURITY: HOW HARD IS IT FOR YOU TO PAY FOR THE VERY BASICS LIKE FOOD, HOUSING, MEDICAL CARE, AND HEATING?: NOT HARD AT ALL

## 2023-03-03 SDOH — ECONOMIC STABILITY: FOOD INSECURITY: WITHIN THE PAST 12 MONTHS, THE FOOD YOU BOUGHT JUST DIDN'T LAST AND YOU DIDN'T HAVE MONEY TO GET MORE.: NEVER TRUE

## 2023-03-03 ASSESSMENT — ENCOUNTER SYMPTOMS
ABDOMINAL PAIN: 0
DIARRHEA: 0
EYE PAIN: 0
CONSTIPATION: 0
SHORTNESS OF BREATH: 0
SORE THROAT: 1
COUGH: 0

## 2023-03-03 ASSESSMENT — PATIENT HEALTH QUESTIONNAIRE - PHQ9: DEPRESSION UNABLE TO ASSESS: URGENT/EMERGENT SITUATION

## 2023-03-03 NOTE — LETTER
March 3, 2023       Tawana Scott YOB: 1995   [address] Date of Visit:  3/3/2023       To Whom It May Concern: It is my medical opinion that Tawana Scott had acute illness. Please excuse 3/2-3/3. If you have any questions or concerns, please don't hesitate to call.     Sincerely,        Aishwarya Liu PA-C

## 2023-03-03 NOTE — PROGRESS NOTES
Tenzin Almanzar (:  1995) is a Established patient, here for evaluation of the following:    Assessment & Plan   Below is the assessment and plan developed based on review of pertinent history, physical exam, labs, studies, and medications. 1. Acute upper respiratory infection  I advised symptomatic treatment with rest, hydration and rotation of antipyretics given today. I have given her a work excuse for yesterday and today. -     amoxicillin-clavulanate (AUGMENTIN) 875-125 MG per tablet; Take 1 tablet by mouth 2 times daily for 10 days, Disp-20 tablet, R-0Normal  -     fluconazole (DIFLUCAN) 150 MG tablet; Take 1 tablet by mouth every 72 hours for 6 days, Disp-2 tablet, R-0Normal  -     ibuprofen (ADVIL;MOTRIN) 600 MG tablet; Take 1 tablet by mouth 3 times daily as needed for Pain, Disp-90 tablet, R-0Normal  Return if symptoms worsen or fail to improve. Subjective   HPI  Ms. Avelina Nina is a pleasant 24year old female with PMH depression who presents to Telehealth clinic today with sore throat, swelling, congestion , headache and fever that started 2 days ago. She denies any cough. Her  was sick last week. She denies any nausea, vomiting, diarrhea. . She did stop smoking but does vaping nicotine. She denies any cough, wheezing, or SOA. Review of Systems   Constitutional:  Positive for fever. Negative for chills and fatigue. HENT:  Positive for congestion and sore throat. Negative for ear pain and nosebleeds. Eyes:  Negative for pain and visual disturbance. Respiratory:  Negative for cough and shortness of breath. Cardiovascular:  Negative for chest pain and palpitations. Gastrointestinal:  Negative for abdominal pain, constipation and diarrhea. Genitourinary:  Negative for difficulty urinating and flank pain. Musculoskeletal:  Negative for arthralgias, gait problem and myalgias. Skin:  Negative for rash. Neurological:  Positive for headaches.  Negative for syncope and light-headedness. Psychiatric/Behavioral:  Negative for behavioral problems, confusion and dysphoric mood. Objective   Patient-Reported Vitals  No data recorded     Physical Exam  [INSTRUCTIONS:  \"[x]\" Indicates a positive item  \"[]\" Indicates a negative item  -- DELETE ALL ITEMS NOT EXAMINED]    Constitutional: [x] Appears well-developed and well-nourished [x] No apparent distress      [] Abnormal -     Mental status: [x] Alert and awake  [x] Oriented to person/place/time [x] Able to follow commands    [] Abnormal -     Eyes:   EOM    [x]  Normal    [] Abnormal -   Sclera  [x]  Normal    [] Abnormal -          Discharge [x]  None visible   [] Abnormal -     HENT: [x] Normocephalic, atraumatic  [x] Abnormal - nasal congestion noted  [x] Mouth/Throat: Mucous membranes are moist    External Ears [x] Normal  [] Abnormal -    Neck: [x] No visualized mass [] Abnormal -     Pulmonary/Chest: [x] Respiratory effort normal   [x] No visualized signs of difficulty breathing or respiratory distress        [] Abnormal -      Musculoskeletal:   [x] Normal gait with no signs of ataxia         [x] Normal range of motion of neck        [] Abnormal -     Neurological:        [x] No Facial Asymmetry (Cranial nerve 7 motor function) (limited exam due to video visit)          [x] No gaze palsy        [] Abnormal -          Skin:        [x] No significant exanthematous lesions or discoloration noted on facial skin         [] Abnormal -            Psychiatric:       [x] Normal Affect [] Abnormal -        [x] No Hallucinations    Other pertinent observable physical exam findings:-         On this date 3/3/2023 I have spent 20 minutes reviewing previous notes, test results and face to face (virtual) with the patient discussing the diagnosis and importance of compliance with the treatment plan as well as documenting on the day of the visit.     Cholo Saucedo, was evaluated through a synchronous (real-time) audio-video encounter. The patient (or guardian if applicable) is aware that this is a billable service, which includes applicable co-pays. This Virtual Visit was conducted with patient's (and/or legal guardian's) consent. The visit was conducted pursuant to the emergency declaration under the [facility], [facility] authority and the [employer] General Act. Patient identification was verified, and a caregiver was present when appropriate. The patient was located at Home: [address] [place] [address]  Provider was located at [place] [address] ([address] [place], [place] [address]t): [address] [place] [address].   Formerly [facility]         --Crystal Beckwith PA-C

## 2023-03-03 NOTE — PROGRESS NOTES
Chief Complaint   Patient presents with    Pharyngitis    Fever    Headache       Pt complains of sore throat headache, two day.

## 2023-03-24 ENCOUNTER — SOCIAL WORK (OUTPATIENT)
Dept: SOCIAL WORK | Facility: HOSPITAL | Age: 28
End: 2023-03-24

## 2023-03-24 ENCOUNTER — OFFICE VISIT (OUTPATIENT)
Dept: PRIMARY CARE CLINIC | Age: 28
End: 2023-03-24

## 2023-03-24 ENCOUNTER — HOSPITAL ENCOUNTER (OUTPATIENT)
Facility: HOSPITAL | Age: 28
Discharge: HOME | End: 2023-03-24
Payer: MEDICAID

## 2023-03-24 VITALS
TEMPERATURE: 98 F | WEIGHT: 293 LBS | RESPIRATION RATE: 18 BRPM | OXYGEN SATURATION: 98 % | BODY MASS INDEX: 48.82 KG/M2 | HEART RATE: 101 BPM | HEIGHT: 65 IN

## 2023-03-24 DIAGNOSIS — F63.3 TRICHOTILLOMANIA: ICD-10-CM

## 2023-03-24 DIAGNOSIS — Z20.2 POSSIBLE EXPOSURE TO STD: ICD-10-CM

## 2023-03-24 DIAGNOSIS — F41.9 ANXIETY AND DEPRESSION: Primary | ICD-10-CM

## 2023-03-24 DIAGNOSIS — R30.0 DYSURIA: ICD-10-CM

## 2023-03-24 DIAGNOSIS — F43.10 PTSD (POST-TRAUMATIC STRESS DISORDER): ICD-10-CM

## 2023-03-24 DIAGNOSIS — F32.A ANXIETY AND DEPRESSION: Primary | ICD-10-CM

## 2023-03-24 DIAGNOSIS — F60.3 BORDERLINE PERSONALITY DISORDER IN ADOLESCENT (HCC): Primary | ICD-10-CM

## 2023-03-24 LAB
BILIRUBIN, POC: NORMAL
BLOOD URINE, POC: NORMAL
CLARITY, POC: NORMAL
COLOR, POC: YELLOW
GLUCOSE URINE, POC: NORMAL
KETONES, POC: NORMAL
LEUKOCYTE EST, POC: NORMAL
NITRITE, POC: NORMAL
PH, POC: 6.5
PROTEIN, POC: NORMAL
SPECIFIC GRAVITY, POC: 1.02
UROBILINOGEN, POC: 0.2

## 2023-03-24 PROCEDURE — 0065U SYFLS TST NONTREPONEMAL ANTB: CPT

## 2023-03-24 PROCEDURE — 86803 HEPATITIS C AB TEST: CPT

## 2023-03-24 PROCEDURE — 87591 N.GONORRHOEAE DNA AMP PROB: CPT

## 2023-03-24 PROCEDURE — 87491 CHLMYD TRACH DNA AMP PROBE: CPT

## 2023-03-24 PROCEDURE — 86780 TREPONEMA PALLIDUM: CPT

## 2023-03-24 PROCEDURE — 87086 URINE CULTURE/COLONY COUNT: CPT

## 2023-03-24 PROCEDURE — 86701 HIV-1ANTIBODY: CPT

## 2023-03-24 PROCEDURE — 86702 HIV-2 ANTIBODY: CPT

## 2023-03-24 PROCEDURE — 87390 HIV-1 AG IA: CPT

## 2023-03-24 RX ORDER — FLUOXETINE HYDROCHLORIDE 40 MG/1
CAPSULE ORAL
Qty: 30 CAPSULE | Refills: 2 | Status: SHIPPED | OUTPATIENT
Start: 2023-03-24

## 2023-03-24 RX ORDER — NITROFURANTOIN 25; 75 MG/1; MG/1
100 CAPSULE ORAL 2 TIMES DAILY
Qty: 20 CAPSULE | Refills: 0 | Status: SHIPPED | OUTPATIENT
Start: 2023-03-24 | End: 2023-04-03

## 2023-03-24 ASSESSMENT — ENCOUNTER SYMPTOMS
SORE THROAT: 0
ABDOMINAL PAIN: 0
SHORTNESS OF BREATH: 0
COUGH: 0
DIARRHEA: 0
EYE PAIN: 0
CONSTIPATION: 0

## 2023-03-24 ASSESSMENT — PATIENT HEALTH QUESTIONNAIRE - PHQ9
5. POOR APPETITE OR OVEREATING: 3
9. THOUGHTS THAT YOU WOULD BE BETTER OFF DEAD, OR OF HURTING YOURSELF: 2
SUM OF ALL RESPONSES TO PHQ QUESTIONS 1-9: 21
1. LITTLE INTEREST OR PLEASURE IN DOING THINGS: 3
3. TROUBLE FALLING OR STAYING ASLEEP: 3
7. TROUBLE CONCENTRATING ON THINGS, SUCH AS READING THE NEWSPAPER OR WATCHING TELEVISION: 0
10. IF YOU CHECKED OFF ANY PROBLEMS, HOW DIFFICULT HAVE THESE PROBLEMS MADE IT FOR YOU TO DO YOUR WORK, TAKE CARE OF THINGS AT HOME, OR GET ALONG WITH OTHER PEOPLE: 3
SUM OF ALL RESPONSES TO PHQ QUESTIONS 1-9: 23
2. FEELING DOWN, DEPRESSED OR HOPELESS: 3
4. FEELING TIRED OR HAVING LITTLE ENERGY: 3
SUM OF ALL RESPONSES TO PHQ9 QUESTIONS 1 & 2: 6
6. FEELING BAD ABOUT YOURSELF - OR THAT YOU ARE A FAILURE OR HAVE LET YOURSELF OR YOUR FAMILY DOWN: 3
SUM OF ALL RESPONSES TO PHQ QUESTIONS 1-9: 23
SUM OF ALL RESPONSES TO PHQ QUESTIONS 1-9: 23
8. MOVING OR SPEAKING SO SLOWLY THAT OTHER PEOPLE COULD HAVE NOTICED. OR THE OPPOSITE, BEING SO FIGETY OR RESTLESS THAT YOU HAVE BEEN MOVING AROUND A LOT MORE THAN USUAL: 3

## 2023-03-24 ASSESSMENT — COLUMBIA-SUICIDE SEVERITY RATING SCALE - C-SSRS
1. WITHIN THE PAST MONTH, HAVE YOU WISHED YOU WERE DEAD OR WISHED YOU COULD GO TO SLEEP AND NOT WAKE UP?: YES
2. HAVE YOU ACTUALLY HAD ANY THOUGHTS OF KILLING YOURSELF?: NO
7. DID THIS OCCUR IN THE LAST THREE MONTHS: NO
6. HAVE YOU EVER DONE ANYTHING, STARTED TO DO ANYTHING, OR PREPARED TO DO ANYTHING TO END YOUR LIFE?: YES

## 2023-03-24 NOTE — PROGRESS NOTES
Chief Complaint   Patient presents with    Anxiety    Depression       Have you seen any other physician or provider since your last visit yes - therapist     Have you had any other diagnostic tests since your last visit? no    Have you changed or stopped any medications since your last visit? no     Pt is wanting to discuss medication change for anxiety/depression.
Cardiovascular:  Negative for chest pain and palpitations. Gastrointestinal:  Negative for abdominal pain, constipation and diarrhea. Genitourinary:  Negative for difficulty urinating and flank pain. Musculoskeletal:  Negative for arthralgias, gait problem and myalgias. Skin:  Negative for rash. Neurological:  Negative for syncope, light-headedness and headaches. Psychiatric/Behavioral:  Positive for dysphoric mood. Negative for behavioral problems, confusion, self-injury and suicidal ideas. The patient is nervous/anxious.       Past Medical History:   Diagnosis Date    Anemia     Anxiety     Borderline personality disorder (HCC)     Depression     GERD (gastroesophageal reflux disease)     Headache     Hypothyroidism     Obesity     OCD (obsessive compulsive disorder)     PCOS (polycystic ovarian syndrome)     Trichotillomania      Past Surgical History:   Procedure Laterality Date    DILATION AND CURETTAGE OF UTERUS      WISDOM TOOTH EXTRACTION       Family History   Problem Relation Age of Onset    Asthma Mother     Diabetes Mother     Depression Mother     Asthma Father     High Blood Pressure Father     Alcohol Abuse Father     Stroke Father     Depression Sister     Diabetes Maternal Grandmother     Depression Maternal Grandmother     High Blood Pressure Maternal Grandmother     Diabetes Maternal Grandfather     High Blood Pressure Maternal Grandfather     Stroke Maternal Grandfather     Diabetes Paternal Grandmother     Cancer Paternal Grandfather         lung    Depression Paternal Aunt     Cancer Paternal Aunt         lung      Social History     Tobacco Use   Smoking Status Every Day    Packs/day: 0.50    Years: 5.00    Pack years: 2.50    Types: Cigarettes    Last attempt to quit: 2018    Years since quittin.3   Smokeless Tobacco Never       OBJECTIVE:   Wt Readings from Last 3 Encounters:   23 (!) 356 lb (161.5 kg)   22 (!) 356 lb 3.2 oz (161.6 kg)   22 (!) 360 lb

## 2023-03-25 LAB — HCV AB SERPL QL IA: NORMAL

## 2023-03-26 LAB
BACTERIA UR CULT: NORMAL
HIV 1+2 AB+HIV1 P24 AG SERPL QL IA: NORMAL
HIV 2 AB SERPL QL IA: NORMAL
HIV1 AB SERPL QL IA: NORMAL
HIV1 P24 AG SERPL QL IA: NORMAL
REAGIN+T PALLIDUM IGG+IGM SERPL-IMP: NORMAL

## 2023-03-27 DIAGNOSIS — R82.71 GROUP B STREPTOCOCCAL BACTERIURIA: Primary | ICD-10-CM

## 2023-03-27 LAB
BACTERIA UR CULT: ABNORMAL
BACTERIA UR CULT: ABNORMAL
ORGANISM: ABNORMAL

## 2023-03-27 RX ORDER — AMOXICILLIN 875 MG/1
875 TABLET, COATED ORAL 2 TIMES DAILY
Qty: 20 TABLET | Refills: 0 | Status: SHIPPED | OUTPATIENT
Start: 2023-03-27 | End: 2023-04-06

## 2023-03-28 LAB
C TRACH DNA UR QL NAA+PROBE: NEGATIVE
N GONORRHOEA DNA UR QL NAA+PROBE: NEGATIVE

## 2023-06-05 DIAGNOSIS — J06.9 ACUTE UPPER RESPIRATORY INFECTION: ICD-10-CM

## 2023-06-07 RX ORDER — IBUPROFEN 600 MG/1
600 TABLET ORAL 3 TIMES DAILY PRN
Qty: 90 TABLET | Refills: 0 | OUTPATIENT
Start: 2023-06-07

## 2023-06-30 DIAGNOSIS — F41.9 ANXIETY AND DEPRESSION: ICD-10-CM

## 2023-06-30 DIAGNOSIS — J06.9 ACUTE UPPER RESPIRATORY INFECTION: ICD-10-CM

## 2023-06-30 DIAGNOSIS — F32.A ANXIETY AND DEPRESSION: ICD-10-CM

## 2023-06-30 RX ORDER — IBUPROFEN 600 MG/1
600 TABLET ORAL 3 TIMES DAILY PRN
Qty: 90 TABLET | Refills: 0 | Status: SHIPPED | OUTPATIENT
Start: 2023-06-30

## 2023-06-30 RX ORDER — FLUOXETINE HYDROCHLORIDE 40 MG/1
CAPSULE ORAL
Qty: 30 CAPSULE | Refills: 0 | Status: SHIPPED | OUTPATIENT
Start: 2023-06-30

## 2023-07-10 DIAGNOSIS — G43.909 EPISODIC MIGRAINE: ICD-10-CM

## 2023-07-10 RX ORDER — TOPIRAMATE 50 MG/1
50 TABLET, FILM COATED ORAL 2 TIMES DAILY
Qty: 60 TABLET | Refills: 2 | OUTPATIENT
Start: 2023-07-10 | End: 2023-08-09

## 2023-07-18 ENCOUNTER — E-VISIT (OUTPATIENT)
Dept: PRIMARY CARE CLINIC | Age: 28
End: 2023-07-18
Payer: MEDICAID

## 2023-07-18 DIAGNOSIS — F41.9 ANXIETY AND DEPRESSION: ICD-10-CM

## 2023-07-18 DIAGNOSIS — G43.909 EPISODIC MIGRAINE: ICD-10-CM

## 2023-07-18 DIAGNOSIS — F32.A ANXIETY AND DEPRESSION: ICD-10-CM

## 2023-07-18 PROCEDURE — 99213 OFFICE O/P EST LOW 20 MIN: CPT | Performed by: PHYSICIAN ASSISTANT

## 2023-07-18 RX ORDER — TOPIRAMATE 50 MG/1
50 TABLET, FILM COATED ORAL 2 TIMES DAILY
Qty: 60 TABLET | Refills: 2 | Status: SHIPPED | OUTPATIENT
Start: 2023-07-18 | End: 2023-10-16

## 2023-07-18 RX ORDER — FLUOXETINE HYDROCHLORIDE 40 MG/1
CAPSULE ORAL
Qty: 30 CAPSULE | Refills: 2 | Status: SHIPPED | OUTPATIENT
Start: 2023-07-18

## 2023-07-18 ASSESSMENT — ANXIETY QUESTIONNAIRES
4. TROUBLE RELAXING: 0
5. BEING SO RESTLESS THAT IT IS HARD TO SIT STILL: NOT AT ALL
IF YOU CHECKED OFF ANY PROBLEMS ON THIS QUESTIONNAIRE, HOW DIFFICULT HAVE THESE PROBLEMS MADE IT FOR YOU TO DO YOUR WORK, TAKE CARE OF THINGS AT HOME, OR GET ALONG WITH OTHER PEOPLE: NOT DIFFICULT AT ALL
GAD7 TOTAL SCORE: 0
2. NOT BEING ABLE TO STOP OR CONTROL WORRYING: NOT AT ALL
IF YOU CHECKED OFF ANY PROBLEMS ON THIS QUESTIONNAIRE, HOW DIFFICULT HAVE THESE PROBLEMS MADE IT FOR YOU TO DO YOUR WORK, TAKE CARE OF THINGS AT HOME, OR GET ALONG WITH OTHER PEOPLE: NOT DIFFICULT AT ALL
3. WORRYING TOO MUCH ABOUT DIFFERENT THINGS: 0
4. TROUBLE RELAXING: NOT AT ALL
6. BECOMING EASILY ANNOYED OR IRRITABLE: NOT AT ALL
1. FEELING NERVOUS, ANXIOUS, OR ON EDGE: NOT AT ALL
7. FEELING AFRAID AS IF SOMETHING AWFUL MIGHT HAPPEN: NOT AT ALL
3. WORRYING TOO MUCH ABOUT DIFFERENT THINGS: NOT AT ALL
6. BECOMING EASILY ANNOYED OR IRRITABLE: 0
GAD7 TOTAL SCORE: 0
5. BEING SO RESTLESS THAT IT IS HARD TO SIT STILL: 0
7. FEELING AFRAID AS IF SOMETHING AWFUL MIGHT HAPPEN: 0
1. FEELING NERVOUS, ANXIOUS, OR ON EDGE: 0
2. NOT BEING ABLE TO STOP OR CONTROL WORRYING: 0

## 2023-07-18 ASSESSMENT — PATIENT HEALTH QUESTIONNAIRE - PHQ9
9. THOUGHTS THAT YOU WOULD BE BETTER OFF DEAD, OR OF HURTING YOURSELF: 0
SUM OF ALL RESPONSES TO PHQ QUESTIONS 1-9: 0
10. IF YOU CHECKED OFF ANY PROBLEMS, HOW DIFFICULT HAVE THESE PROBLEMS MADE IT FOR YOU TO DO YOUR WORK, TAKE CARE OF THINGS AT HOME, OR GET ALONG WITH OTHER PEOPLE: NOT DIFFICULT AT ALL
7. TROUBLE CONCENTRATING ON THINGS, SUCH AS READING THE NEWSPAPER OR WATCHING TELEVISION: 0
6. FEELING BAD ABOUT YOURSELF - OR THAT YOU ARE A FAILURE OR HAVE LET YOURSELF OR YOUR FAMILY DOWN: NOT AT ALL
6. FEELING BAD ABOUT YOURSELF - OR THAT YOU ARE A FAILURE OR HAVE LET YOURSELF OR YOUR FAMILY DOWN: 0
SUM OF ALL RESPONSES TO PHQ9 QUESTIONS 1 & 2: 0
SUM OF ALL RESPONSES TO PHQ QUESTIONS 1-9: 0
5. POOR APPETITE OR OVEREATING: 0
9. THOUGHTS THAT YOU WOULD BE BETTER OFF DEAD, OR OF HURTING YOURSELF: NOT AT ALL
2. FEELING DOWN, DEPRESSED OR HOPELESS: NOT AT ALL
1. LITTLE INTEREST OR PLEASURE IN DOING THINGS: 0
SUM OF ALL RESPONSES TO PHQ QUESTIONS 1-9: 0
3. TROUBLE FALLING OR STAYING ASLEEP: 0
7. TROUBLE CONCENTRATING ON THINGS, SUCH AS READING THE NEWSPAPER OR WATCHING TELEVISION: NOT AT ALL
4. FEELING TIRED OR HAVING LITTLE ENERGY: NOT AT ALL
1. LITTLE INTEREST OR PLEASURE IN DOING THINGS: NOT AT ALL
8. MOVING OR SPEAKING SO SLOWLY THAT OTHER PEOPLE COULD HAVE NOTICED. OR THE OPPOSITE - BEING SO FIDGETY OR RESTLESS THAT YOU HAVE BEEN MOVING AROUND A LOT MORE THAN USUAL: NOT AT ALL
4. FEELING TIRED OR HAVING LITTLE ENERGY: 0
SUM OF ALL RESPONSES TO PHQ QUESTIONS 1-9: 0
SUM OF ALL RESPONSES TO PHQ QUESTIONS 1-9: 0
2. FEELING DOWN, DEPRESSED OR HOPELESS: 0
3. TROUBLE FALLING OR STAYING ASLEEP: NOT AT ALL
8. MOVING OR SPEAKING SO SLOWLY THAT OTHER PEOPLE COULD HAVE NOTICED. OR THE OPPOSITE, BEING SO FIGETY OR RESTLESS THAT YOU HAVE BEEN MOVING AROUND A LOT MORE THAN USUAL: 0
5. POOR APPETITE OR OVEREATING: NOT AT ALL
10. IF YOU CHECKED OFF ANY PROBLEMS, HOW DIFFICULT HAVE THESE PROBLEMS MADE IT FOR YOU TO DO YOUR WORK, TAKE CARE OF THINGS AT HOME, OR GET ALONG WITH OTHER PEOPLE: 0

## 2023-07-18 ASSESSMENT — PATIENT HEALTH QUESTIONNAIRE - GENERAL
HAVE YOU BEEN EXPERIENCING VERY LOW OR VERY HIGH MOODS: N
HAVE YOU BEEN EXPERIENCING INVOLUNTARY WEIGHT GAIN OR LOSS: N
HAVE YOU BEEN EXPERIENCING NEW OR WORSENING MUSCLE TWITCHING, SHAKINESS, OR OTHER UNUSUAL CHANGES IN YOUR MUSCLE MOVEMENT: N
SINCE YOUR LAST VISIT, HAVE YOU EXPERIENCED EPISODES OF FAINTING OR NEAR FAINTING: N
HAVE YOU BEEN EXPERIENCING NEW OR WORSENING HEADACHES: N
HAVE YOU BEEN EXPERIENCING RACING THOUGHTS OR IMPULSIVE BEHAVIOR: N
DO YOU HAVE A FASTER HEART RATE THAN USUAL, DOES YOUR HEART RATE FEEL IRREGULAR OR DO YOU FEEL LIKE YOUR HEART IS POUNDING AT REST: N
HAVE YOU BEEN EXPERIENCING AN UNUSUALLY DRY MOUTH: N
HAVE YOU BEEN EXPERIENCING ABDOMINAL DISCOMFORT, NAUSEA OR CHANGES IN YOUR BOWEL PATTERN: N
HAVE YOU BEEN EXPERIENCING NEW OR WORSENING VISUAL CHANGES: N
HAVE YOU BEEN EXPERIENCING VIVID DREAMS OR NIGHTMARES THAT INTERFERE WITH YOUR SLEEP: N
HAVE YOU BEEN EXPERIENCING UNEXPLAINED HIGH FEVERS OR EXCESSIVE SWEATING: N
HAVE YOU BEEN EXPERIENCING LIGHT HEADEDNESS, WOOZINESS, OR DIZZINESS, ESPECIALLY UPON STANDING FROM SITTING OR LYING POSITIONS: N
DO YOU HAVE ANY NEW RASHES OR UNEXPLAINED ITCHING OR SWELLING: N
HAVE YOU BEEN EXPERIENCING NEW OR WORSENING PROBLEMS WITH YOUR SEXUAL FUNCTION: N
HAVE YOU BEEN EXPERIENCING NEW OR WORSENING DIFFICULTY WITH URINATION OR CHANGE IN URINARY PATTERN: N
HAVE YOU BEEN EXPERIENCING HALLUCINATIONS OR CONFUSION: N

## 2023-07-18 NOTE — PROGRESS NOTES
Finn Lay (:  1995) is a Established patient, presenting virtually for evaluation of the following:    Assessment & Plan   Below is the assessment and plan developed based on review of pertinent history, physical exam, labs, studies, and medications. 1. Anxiety and depression  Stable ;continue biweekly counseling and current medication regimen. FUP 4 weeks. -     FLUoxetine (PROZAC) 40 MG capsule; Take 1 capsule by mouth once daily, Disp-30 capsule, R-2Normal  2. Episodic migraine  Stable; Continue current medication regimen. -     topiramate (TOPAMAX) 50 MG tablet; Take 1 tablet by mouth 2 times daily Take one tablet daily, Disp-60 tablet, R-2Normal    No follow-ups on file. Ankit Bran is a 34year old female with PMH anxiety and  depression who presents today for follow up  anxietyand depression and episodic migraine. She is followed by [facility] counseling center and has been seeing them every 2 weeks. She completed PHQ 9 today and score was 0. She has remained on Topamax and Prozac and feels her sympotms are better since her last office visit. .  She reports overall stability in her anxiety and depression. Review of Systems   Refer to HPI    Objective   Patient-Reported Vitals  No data recorded     Physical Exam  Deferred due to E visit       On this date 2023 I have spent 20 minutes reviewing previous notes, test results and face to face (virtual) with the patient discussing the diagnosis and importance of compliance with the treatment plan as well as documenting on the day of the visit.              --Miki Mcfarland PA-C

## 2023-08-30 ENCOUNTER — APPOINTMENT (OUTPATIENT)
Dept: GENERAL RADIOLOGY | Facility: HOSPITAL | Age: 28
End: 2023-08-30
Payer: COMMERCIAL

## 2023-08-30 ENCOUNTER — HOSPITAL ENCOUNTER (EMERGENCY)
Facility: HOSPITAL | Age: 28
Discharge: HOME | End: 2023-08-30
Attending: EMERGENCY MEDICINE
Payer: COMMERCIAL

## 2023-08-30 VITALS
WEIGHT: 293 LBS | OXYGEN SATURATION: 97 % | HEIGHT: 65 IN | DIASTOLIC BLOOD PRESSURE: 75 MMHG | SYSTOLIC BLOOD PRESSURE: 99 MMHG | HEART RATE: 80 BPM | BODY MASS INDEX: 48.82 KG/M2 | TEMPERATURE: 98.2 F | RESPIRATION RATE: 18 BRPM

## 2023-08-30 DIAGNOSIS — J45.21 MILD INTERMITTENT ASTHMA WITH EXACERBATION: Primary | ICD-10-CM

## 2023-08-30 LAB
ALBUMIN SERPL-MCNC: 4 G/DL (ref 3.5–5.2)
ALBUMIN/GLOB SERPL: 1 G/DL
ALP SERPL-CCNC: 79 U/L (ref 39–117)
ALT SERPL W P-5'-P-CCNC: 12 U/L (ref 1–33)
ANION GAP SERPL CALCULATED.3IONS-SCNC: 14 MMOL/L (ref 5–15)
AST SERPL-CCNC: 15 U/L (ref 1–32)
BASOPHILS # BLD AUTO: 0.13 10*3/MM3 (ref 0–0.2)
BASOPHILS NFR BLD AUTO: 1 % (ref 0–1.5)
BILIRUB SERPL-MCNC: 0.4 MG/DL (ref 0–1.2)
BUN SERPL-MCNC: 11 MG/DL (ref 6–20)
BUN/CREAT SERPL: 12.8 (ref 7–25)
CALCIUM SPEC-SCNC: 9.4 MG/DL (ref 8.6–10.5)
CHLORIDE SERPL-SCNC: 106 MMOL/L (ref 98–107)
CO2 SERPL-SCNC: 22 MMOL/L (ref 22–29)
CREAT SERPL-MCNC: 0.86 MG/DL (ref 0.57–1)
DEPRECATED RDW RBC AUTO: 40.7 FL (ref 37–54)
EGFRCR SERPLBLD CKD-EPI 2021: 95.7 ML/MIN/1.73
EOSINOPHIL # BLD AUTO: 0.53 10*3/MM3 (ref 0–0.4)
EOSINOPHIL NFR BLD AUTO: 4.2 % (ref 0.3–6.2)
ERYTHROCYTE [DISTWIDTH] IN BLOOD BY AUTOMATED COUNT: 12.9 % (ref 12.3–15.4)
GLOBULIN UR ELPH-MCNC: 4 GM/DL
GLUCOSE SERPL-MCNC: 95 MG/DL (ref 65–99)
HCT VFR BLD AUTO: 48 % (ref 34–46.6)
HGB BLD-MCNC: 15.7 G/DL (ref 12–15.9)
HOLD SPECIMEN: NORMAL
HOLD SPECIMEN: NORMAL
IMM GRANULOCYTES # BLD AUTO: 0.04 10*3/MM3 (ref 0–0.05)
IMM GRANULOCYTES NFR BLD AUTO: 0.3 % (ref 0–0.5)
LYMPHOCYTES # BLD AUTO: 3.05 10*3/MM3 (ref 0.7–3.1)
LYMPHOCYTES NFR BLD AUTO: 24.4 % (ref 19.6–45.3)
MCH RBC QN AUTO: 28.4 PG (ref 26.6–33)
MCHC RBC AUTO-ENTMCNC: 32.7 G/DL (ref 31.5–35.7)
MCV RBC AUTO: 86.8 FL (ref 79–97)
MONOCYTES # BLD AUTO: 0.85 10*3/MM3 (ref 0.1–0.9)
MONOCYTES NFR BLD AUTO: 6.8 % (ref 5–12)
NEUTROPHILS NFR BLD AUTO: 63.3 % (ref 42.7–76)
NEUTROPHILS NFR BLD AUTO: 7.9 10*3/MM3 (ref 1.7–7)
NRBC BLD AUTO-RTO: 0 /100 WBC (ref 0–0.2)
NT-PROBNP SERPL-MCNC: 58.7 PG/ML (ref 0–450)
PLATELET # BLD AUTO: 305 10*3/MM3 (ref 140–450)
PMV BLD AUTO: 12 FL (ref 6–12)
POTASSIUM SERPL-SCNC: 3.7 MMOL/L (ref 3.5–5.2)
PROT SERPL-MCNC: 8 G/DL (ref 6–8.5)
RBC # BLD AUTO: 5.53 10*6/MM3 (ref 3.77–5.28)
SODIUM SERPL-SCNC: 142 MMOL/L (ref 136–145)
TROPONIN T SERPL HS-MCNC: <6 NG/L
WBC NRBC COR # BLD: 12.5 10*3/MM3 (ref 3.4–10.8)
WHOLE BLOOD HOLD COAG: NORMAL
WHOLE BLOOD HOLD SPECIMEN: NORMAL

## 2023-08-30 PROCEDURE — 99284 EMERGENCY DEPT VISIT MOD MDM: CPT

## 2023-08-30 PROCEDURE — 94640 AIRWAY INHALATION TREATMENT: CPT

## 2023-08-30 PROCEDURE — 93005 ELECTROCARDIOGRAM TRACING: CPT

## 2023-08-30 PROCEDURE — 83880 ASSAY OF NATRIURETIC PEPTIDE: CPT

## 2023-08-30 PROCEDURE — 94761 N-INVAS EAR/PLS OXIMETRY MLT: CPT

## 2023-08-30 PROCEDURE — 36415 COLL VENOUS BLD VENIPUNCTURE: CPT

## 2023-08-30 PROCEDURE — 71045 X-RAY EXAM CHEST 1 VIEW: CPT

## 2023-08-30 PROCEDURE — 85025 COMPLETE CBC W/AUTO DIFF WBC: CPT

## 2023-08-30 PROCEDURE — 80053 COMPREHEN METABOLIC PANEL: CPT

## 2023-08-30 PROCEDURE — 84484 ASSAY OF TROPONIN QUANT: CPT

## 2023-08-30 RX ORDER — ALBUTEROL SULFATE 90 UG/1
AEROSOL, METERED RESPIRATORY (INHALATION)
COMMUNITY
Start: 2023-05-13

## 2023-08-30 RX ORDER — IPRATROPIUM BROMIDE AND ALBUTEROL SULFATE 2.5; .5 MG/3ML; MG/3ML
3 SOLUTION RESPIRATORY (INHALATION) ONCE
Status: COMPLETED | OUTPATIENT
Start: 2023-08-30 | End: 2023-08-30

## 2023-08-30 RX ORDER — SODIUM CHLORIDE 0.9 % (FLUSH) 0.9 %
10 SYRINGE (ML) INJECTION AS NEEDED
Status: DISCONTINUED | OUTPATIENT
Start: 2023-08-30 | End: 2023-08-30 | Stop reason: HOSPADM

## 2023-08-30 RX ORDER — PREDNISONE 20 MG/1
20 TABLET ORAL 2 TIMES DAILY
Qty: 10 TABLET | Refills: 0 | Status: SHIPPED | OUTPATIENT
Start: 2023-08-30 | End: 2023-09-04

## 2023-08-30 RX ADMIN — IPRATROPIUM BROMIDE AND ALBUTEROL SULFATE 3 ML: .5; 3 SOLUTION RESPIRATORY (INHALATION) at 16:37

## 2023-08-30 NOTE — ED PROVIDER NOTES
Subjective  History of Present Illness:    Chief Complaint: Asthma attack  History of Present Illness: 28-year-old female states she had a asthma attack while at work, she had difficulty breathing and was wheezing, she has a history of asthma, and she gets occasional.  Her only medication is a rescue inhaler.  She does state that she vapes, but she denies any smoking.  She states her breathing is better at this point.  Onset: Sudden onset  Duration: Symptoms occurred about 1 hour prior to arrival  Exacerbating / Alleviating factors: Patient was work when the symptoms started unknown trigger  Associated symptoms: Wheezing and shortness of breath      Nurses Notes reviewed and agree, including vitals, allergies, social history and prior medical history.     REVIEW OF SYSTEMS: All systems reviewed and not pertinent unless noted.    Review of Systems   Respiratory:  Positive for shortness of breath and wheezing.    All other systems reviewed and are negative.    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    Visual disturbance 09/28/2021    Patient reported is supposed to wear Rx glasses but broke them and does not use       Allergies:    Bee venom and Sertraline hcl      Past Surgical History:   Procedure Laterality Date    COLONOSCOPY N/A 05/08/2019    Procedure: COLONOSCOPY;  Surgeon: Alex Bartholomew MD;  Location: [facility] ENDOSCOPY;  Service:  Gastroenterology    D & C HYSTEROSCOPY N/A 02/11/2020    Procedure: DILATATION AND CURETTAGE DIAGNOSTIC HYSTEROSCOPY;  Surgeon: Niya Sun MD;  Location: [facility] OR;  Service: Obstetrics/Gynecology;  Laterality: N/A;    D & C HYSTEROSCOPY N/A 10/05/2021    Procedure: DILATATION AND CURETTAGE, DIAGNOSTIC HYSTEROSCOPY;  Surgeon: Niya Sun MD;  Location: [facility] OR;  Service: Obstetrics/Gynecology;  Laterality: N/A;    D & C HYSTEROSCOPY N/A 07/15/2022    Procedure: DILATATION AND CURETTAGE HYSTEROSCOPY, Mirena IUD insertion;  Surgeon: Yasir Lenz MD;  Location: [facility] OR;  Service: Obstetrics/Gynecology;  Laterality: N/A;    D & C WITH SUCTION  07/13/2022    DILATATION AND CURETTAGE      ENDOSCOPY      HYSTEROSCOPY      SKIN BIOPSY Bilateral     breasts    WISDOM TOOTH EXTRACTION           Social History     Socioeconomic History    Marital status:    Tobacco Use    Smoking status: Every Day     Packs/day: 1.00     Years: 2.00     Pack years: 2.00     Types: Cigarettes    Smokeless tobacco: Never   Vaping Use    Vaping Use: Former    Quit date: 3/28/2021    Substances: Nicotine    Devices: Disposable   Substance and Sexual Activity    Alcohol use: Yes     Comment: rarely    Drug use: Yes     Types: Marijuana     Comment: Marijuana use 1-2 a year.    Sexual activity: Yes     Partners: Male     Birth control/protection: I.U.D.         Family History   Problem Relation Age of Onset    Depression Mother     Diabetes Mother     Hyperlipidemia Mother     Hypertension Mother     Arthritis Mother     Mental illness Mother     Obesity Mother     Arthritis Father     COPD Father     Depression Father     Heart disease Father     Hypertension Father     Hyperlipidemia Father     Stroke Father     Obesity Father     Mental illness Father     Heart attack Father     Depression Maternal Aunt     Diabetes Maternal Aunt     Hypertension Maternal Aunt     Mental illness Maternal Aunt     Obesity Maternal  "Aunt     Cancer Paternal Aunt     Breast cancer Paternal Aunt     Diabetes Other         Grandmother     Mental illness Sister     Mental illness Sister     Melanoma Maternal Grandmother     Diabetes Maternal Grandmother     Stroke Paternal Grandmother        Objective  Physical Exam:  BP 99/75 (BP Location: Left arm, Patient Position: Lying)   Pulse 80   Temp 98.2 °F (36.8 °C) (Oral)   Resp 18   Ht 165.1 cm (65\")   Wt (!) 143 kg (315 lb 11.2 oz)   LMP 08/27/2023   SpO2 97%   BMI 52.54 kg/m²      Physical Exam  Vitals and nursing note reviewed.   Constitutional:       Appearance: She is well-developed.   Cardiovascular:      Rate and Rhythm: Normal rate and regular rhythm.   Pulmonary:      Effort: Pulmonary effort is normal.      Breath sounds: Wheezing present.   Abdominal:      General: Bowel sounds are normal.      Palpations: Abdomen is soft.   Musculoskeletal:         General: Normal range of motion.      Cervical back: Normal range of motion and neck supple.   Skin:     General: Skin is warm and dry.   Neurological:      Mental Status: She is alert and oriented to person, place, and time.      Deep Tendon Reflexes: Reflexes are normal and symmetric.         Procedures    ED Course:    ED Course as of 08/30/23 1859   Wed Aug 30, 2023   1618 EKG interpreted by me: Sinus rhythm, normal rate, no acute ST changes, some nonspecific T waves, this is an abnormal EKG [MP]      ED Course User Index  [MP] Morgan Andrea MD       Lab Results (last 24 hours)       Procedure Component Value Units Date/Time    CBC & Differential [[id number]]  (Abnormal) Collected: 08/30/23 1605    Specimen: Blood Updated: 08/30/23 1621    Narrative:      The following orders were created for panel order CBC & Differential.  Procedure                               Abnormality         Status                     ---------                               -----------         ------                     CBC Auto " Differential[[id number]]        Abnormal            Final result                 Please view results for these tests on the individual orders.    Comprehensive Metabolic Panel [[id number]] Collected: 08/30/23 1605    Specimen: Blood Updated: 08/30/23 1631     Glucose 95 mg/dL      BUN 11 mg/dL      Creatinine 0.86 mg/dL      Sodium 142 mmol/L      Potassium 3.7 mmol/L      Comment: Slight hemolysis detected by analyzer. Results may be affected.        Chloride 106 mmol/L      CO2 22.0 mmol/L      Calcium 9.4 mg/dL      Total Protein 8.0 g/dL      Albumin 4.0 g/dL      ALT (SGPT) 12 U/L      AST (SGOT) 15 U/L      Alkaline Phosphatase 79 U/L      Total Bilirubin 0.4 mg/dL      Globulin 4.0 gm/dL      A/G Ratio 1.0 g/dL      BUN/Creatinine Ratio 12.8     Anion Gap 14.0 mmol/L      eGFR 95.7 mL/min/1.73     Narrative:      GFR Normal >60  Chronic Kidney Disease <60  Kidney Failure <15      BNP [[id number]]  (Normal) Collected: 08/30/23 1605    Specimen: Blood Updated: 08/30/23 1634     proBNP 58.7 pg/mL     Narrative:      Among patients with dyspnea, NT-proBNP is highly sensitive for the detection of acute congestive heart failure. In addition NT-proBNP of <300 pg/ml effectively rules out acute congestive heart failure with 99% negative predictive value.      Single High Sensitivity Troponin T [[id number]]  (Normal) Collected: 08/30/23 1605    Specimen: Blood Updated: 08/30/23 1634     HS Troponin T <6 ng/L     Narrative:      High Sensitive Troponin T Reference Range:  <10.0 ng/L- Negative Female for AMI  <15.0 ng/L- Negative Male for AMI  >=10 - Abnormal Female indicating possible myocardial injury.  >=15 - Abnormal Male indicating possible myocardial injury.   Clinicians would have to utilize clinical acumen, EKG, Troponin, and serial changes to determine if it is an Acute Myocardial Infarction or myocardial injury due to an underlying chronic condition.         CBC Auto Differential [[id number]]  (Abnormal) Collected:  08/30/23 1605    Specimen: Blood Updated: 08/30/23 1621     WBC 12.50 10*3/mm3      RBC 5.53 10*6/mm3      Hemoglobin 15.7 g/dL      Hematocrit 48.0 %      MCV 86.8 fL      MCH 28.4 pg      MCHC 32.7 g/dL      RDW 12.9 %      RDW-SD 40.7 fl      MPV 12.0 fL      Platelets 305 10*3/mm3      Neutrophil % 63.3 %      Lymphocyte % 24.4 %      Monocyte % 6.8 %      Eosinophil % 4.2 %      Basophil % 1.0 %      Immature Grans % 0.3 %      Neutrophils, Absolute 7.90 10*3/mm3      Lymphocytes, Absolute 3.05 10*3/mm3      Monocytes, Absolute 0.85 10*3/mm3      Eosinophils, Absolute 0.53 10*3/mm3      Basophils, Absolute 0.13 10*3/mm3      Immature Grans, Absolute 0.04 10*3/mm3      nRBC 0.0 /100 WBC              No radiology results from the last 24 hrs       Medical Decision Making  Patient Presentation 28-year-old female presents with a history of asthma, and reported asthma attack.  On my initial assessment she was in no acute distress, vital signs stable    DDX URI, asthma attack, bronchitis, pneumonia,    Data Review/Analysis/Ordering unique tests reviewed and summarized previous medical records, labs are drawn including CBC, CMP    Independent Review Studies interpreted all labs and discussed with the patient the bedside    Intervention/Re-evaluation on reassessment the patient was doing fine, she did receive a DuoNeb    Independent Clinician no consultations    Risk Stratification tools/clinical decision rules patient has a history of asthma and she does not vape, this increases her risk for upper respiratory infection, asthma attacks, bronchitis or pneumonia.  Her vital signs are stable, labs unremarkable, she did improve with a DuoNeb.    Shared Decision Making I discussed the plan with the patient at the bedside she agrees    Disposition patient is stable will be discharged home with a dose of steroids and continue her albuterol inhaler    Problems Addressed:  Mild intermittent asthma with exacerbation: complicated  acute illness or injury    Amount and/or Complexity of Data Reviewed  Radiology: ordered and independent interpretation performed.    Risk  Prescription drug management.          Final diagnoses:   Mild intermittent asthma with exacerbation          Levon Pichardo Jr., PA-C  08/30/23 [id number]

## 2023-08-30 NOTE — Clinical Note
[facility] EMERGENCY DEPARTMENT  [address] [address]  Phone: [phone]    Gracie Clemente was seen and treated in our emergency department on 8/30/2023.  She may return to work on 08/31/2023.         Thank you for choosing [facility].    Levon Pichardo Jr., JAKE       ,

## 2023-09-08 ENCOUNTER — OFFICE VISIT (OUTPATIENT)
Dept: PRIMARY CARE CLINIC | Age: 28
End: 2023-09-08
Payer: MEDICAID

## 2023-09-08 VITALS
SYSTOLIC BLOOD PRESSURE: 104 MMHG | WEIGHT: 293 LBS | BODY MASS INDEX: 49.92 KG/M2 | DIASTOLIC BLOOD PRESSURE: 71 MMHG | OXYGEN SATURATION: 97 % | HEART RATE: 85 BPM

## 2023-09-08 DIAGNOSIS — J45.20 MILD INTERMITTENT ASTHMA WITHOUT COMPLICATION: ICD-10-CM

## 2023-09-08 DIAGNOSIS — F41.8 MIXED ANXIETY AND DEPRESSIVE DISORDER: Primary | ICD-10-CM

## 2023-09-08 PROCEDURE — G8427 DOCREV CUR MEDS BY ELIG CLIN: HCPCS | Performed by: PHYSICIAN ASSISTANT

## 2023-09-08 PROCEDURE — 4004F PT TOBACCO SCREEN RCVD TLK: CPT | Performed by: PHYSICIAN ASSISTANT

## 2023-09-08 PROCEDURE — G8417 CALC BMI ABV UP PARAM F/U: HCPCS | Performed by: PHYSICIAN ASSISTANT

## 2023-09-08 PROCEDURE — 99213 OFFICE O/P EST LOW 20 MIN: CPT | Performed by: PHYSICIAN ASSISTANT

## 2023-09-08 RX ORDER — FLUTICASONE PROPIONATE 110 UG/1
2 AEROSOL, METERED RESPIRATORY (INHALATION) 2 TIMES DAILY
Qty: 12 G | Refills: 3 | Status: SHIPPED | OUTPATIENT
Start: 2023-09-08 | End: 2024-09-07

## 2023-09-08 ASSESSMENT — ENCOUNTER SYMPTOMS
DIARRHEA: 0
SORE THROAT: 0
SHORTNESS OF BREATH: 0
ABDOMINAL PAIN: 0
COUGH: 0
CONSTIPATION: 0
EYE PAIN: 0

## 2023-09-25 ENCOUNTER — OFFICE VISIT (OUTPATIENT)
Dept: OBSTETRICS AND GYNECOLOGY | Facility: CLINIC | Age: 28
End: 2023-09-25

## 2023-09-25 VITALS
DIASTOLIC BLOOD PRESSURE: 64 MMHG | HEIGHT: 65 IN | SYSTOLIC BLOOD PRESSURE: 110 MMHG | BODY MASS INDEX: 48.82 KG/M2 | WEIGHT: 293 LBS

## 2023-09-25 DIAGNOSIS — N85.01 SIMPLE ENDOMETRIAL HYPERPLASIA WITHOUT ATYPIA: ICD-10-CM

## 2023-09-25 DIAGNOSIS — N91.2 AMENORRHEA: Primary | ICD-10-CM

## 2023-09-25 PROCEDURE — 1159F MED LIST DOCD IN RCRD: CPT | Performed by: PHYSICIAN ASSISTANT

## 2023-09-25 PROCEDURE — 99213 OFFICE O/P EST LOW 20 MIN: CPT | Performed by: PHYSICIAN ASSISTANT

## 2023-09-25 PROCEDURE — 1160F RVW MEDS BY RX/DR IN RCRD: CPT | Performed by: PHYSICIAN ASSISTANT

## 2023-09-25 RX ORDER — PREDNISONE 20 MG/1
1 TABLET ORAL EVERY 12 HOURS SCHEDULED
COMMUNITY
Start: 2023-09-11

## 2023-09-25 RX ORDER — DEXAMETHASONE 4 MG/1
2 TABLET ORAL 2 TIMES DAILY
COMMUNITY
Start: 2023-09-08

## 2023-09-25 NOTE — PROGRESS NOTES
Subjective   Chief Complaint   Patient presents with    Menstrual Problem     Complains of irregular cycles, no period for 2 months.       Gracie Clemente is a 28 y.o. year old  presenting to be seen for amenorrhea with Mirena IUD  Mirena inserted 2022 for cycle control. Reports had been having monthly very light bleeds lasting 1-2 days after Mirena insertion until the last 2 months has not had any bleeding  Pathology from [facility]&C had noted simple endometrial hyperplasia without atypia. It was recommended she return for pelvic ultrasound 3 months post D&C to check endometrium but patient did not return      Past Medical History:   Diagnosis Date    Abnormal uterine bleeding (AUB)     Amenorrhea, secondary     Anemia     Anxiety and depression     Body piercing 2021    ears, nostril x2    Borderline personality disorder     Gastroesophageal reflux disease     Headache     History of bronchitis 2021    History of suicidal ideation 2017    Low back pain     Morbid obesity with BMI of 50.0-59.9, adult     Nightmares     Obesity     Rectal bleeding     Reported as history    Recurrent major depressive disorder     Seasonal allergies 2021    Tattoos 2021    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    Visual disturbance 2021    Patient reported is supposed to wear Rx glasses but broke them and does not use        Current Outpatient Medications:     Flovent  MCG/ACT inhaler, Inhale 2 puffs 2 (Two) Times a Day., Disp: , Rfl:     FLUoxetine (PROzac) 20 MG capsule, TAKE ONE CAPSULE BY MOUTH EVERY DAY (Patient taking differently: Take 1 capsule by mouth Daily.), Disp: 30 capsule, Rfl: 5    ibuprofen (ADVIL,MOTRIN) 600 MG tablet, Take 1 tablet by mouth Every 6 (Six) Hours As Needed for Moderate Pain ., Disp: 30 tablet, Rfl: 1    Levonorgestrel (Mirena, 52 MG,) 20 MCG/DAY intrauterine device IUD, 1 each by Intrauterine route., Disp: , Rfl:     topiramate (TOPAMAX) 50 MG tablet, Take  1 tablet by mouth Daily., Disp: , Rfl:     triamcinolone (KENALOG) 0.1 % cream, Apply  topically 2 (Two) Times a Day. (Patient taking differently: Apply 1 application  topically to the appropriate area as directed 2 (Two) Times a Day.), Disp: 45 g, Rfl: 6    Ventolin  (90 Base) MCG/ACT inhaler, INHALE 2 PUFFS BY MOUTH 4 TIMES DAILY AS NEEDED FOR WHEEZING, Disp: , Rfl:     Levonorgestrel (MIRENA) 20 MCG/DAY intrauterine device IUD, Take to provider's office, Disp: 1 each, Rfl: 0    predniSONE (DELTASONE) 20 MG tablet, Take 1 tablet by mouth Every 12 (Twelve) Hours. (Patient not taking: Reported on 9/25/2023), Disp: , Rfl:     Current Facility-Administered Medications:     Levonorgestrel (MIRENA) 20 MCG/DAY IUD intrauterine device 1 each, 1 each, Intrauterine, Once, Yasir Lenz MD   Allergies   Allergen Reactions    Bee Venom Anaphylaxis    Sertraline Hcl Other (See Comments)     Nightmares      Past Surgical History:   Procedure Laterality Date    COLONOSCOPY N/A 05/08/2019    Procedure: COLONOSCOPY;  Surgeon: Alex Bartholomew MD;  Location: [facility] ENDOSCOPY;  Service: Gastroenterology    D & C HYSTEROSCOPY N/A 02/11/2020    Procedure: DILATATION AND CURETTAGE DIAGNOSTIC HYSTEROSCOPY;  Surgeon: Niya Sun MD;  Location: [facility] OR;  Service: Obstetrics/Gynecology;  Laterality: N/A;    D & C HYSTEROSCOPY N/A 10/05/2021    Procedure: DILATATION AND CURETTAGE, DIAGNOSTIC HYSTEROSCOPY;  Surgeon: Niya Sun MD;  Location: [facility] OR;  Service: Obstetrics/Gynecology;  Laterality: N/A;    D & C HYSTEROSCOPY N/A 07/15/2022    Procedure: DILATATION AND CURETTAGE HYSTEROSCOPY, Mirena IUD insertion;  Surgeon: Yasir Lenz MD;  Location: [facility] OR;  Service: Obstetrics/Gynecology;  Laterality: N/A;    D & C WITH SUCTION  07/13/2022    DILATATION AND CURETTAGE      ENDOSCOPY      HYSTEROSCOPY      SKIN BIOPSY Bilateral     breasts    WISDOM TOOTH EXTRACTION        Social History  "    Socioeconomic History    Marital status:    Tobacco Use    Smoking status: Every Day     Types: Electronic Cigarette    Smokeless tobacco: Never   Vaping Use    Vaping Use: Former    Quit date: 3/28/2021    Substances: Nicotine    Devices: Disposable   Substance and Sexual Activity    Alcohol use: Not Currently     Comment: rarely    Drug use: Not Currently     Types: Marijuana     Comment: Marijuana use 1-2 a year.    Sexual activity: Yes     Partners: Male     Birth control/protection: I.U.D.      Family History   Problem Relation Age of Onset    Depression Mother     Diabetes Mother     Hyperlipidemia Mother     Hypertension Mother     Arthritis Mother     Mental illness Mother     Obesity Mother     Arthritis Father     COPD Father     Depression Father     Heart disease Father     Hypertension Father     Hyperlipidemia Father     Stroke Father     Obesity Father     Mental illness Father     Heart attack Father     Depression Maternal Aunt     Diabetes Maternal Aunt     Hypertension Maternal Aunt     Mental illness Maternal Aunt     Obesity Maternal Aunt     Cancer Paternal Aunt     Breast cancer Paternal Aunt     Diabetes Other         Grandmother     Mental illness Sister     Mental illness Sister     Melanoma Maternal Grandmother     Diabetes Maternal Grandmother     Stroke Paternal Grandmother        Review of Systems   Constitutional:  Negative for chills, diaphoresis and fever.   Gastrointestinal:  Negative for constipation, diarrhea, nausea and vomiting.   Genitourinary:  Positive for menstrual problem. Negative for difficulty urinating, dysuria, pelvic pain and vaginal bleeding.         Objective   /64   Ht 165.1 cm (65\")   Wt (!) 143 kg (315 lb 6.4 oz)   LMP 08/27/2023   BMI 52.49 kg/m²     Physical Exam  Constitutional:       Appearance: Normal appearance. She is well-developed and well-groomed. She is morbidly obese.   Eyes:      General: Lids are normal.      Extraocular " Movements: Extraocular movements intact.      Conjunctiva/sclera: Conjunctivae normal.   Genitourinary:     Labia:         Right: No rash, tenderness or lesion.         Left: No rash, tenderness or lesion.       Urethra: No prolapse, urethral pain, urethral swelling or urethral lesion.      Vagina: No vaginal discharge, tenderness or lesions.      Cervix: No cervical motion tenderness, discharge, friability or lesion.      Comments: IUD strings 2 cm  Neurological:      General: No focal deficit present.      Mental Status: She is alert and oriented to person, place, and time.   Psychiatric:         Attention and Perception: Attention normal.         Mood and Affect: Mood normal.         Speech: Speech normal.         Behavior: Behavior is cooperative.          Result Review :           TISSUE EXAM, [facility] ([facility]) (07/15/2022 09:00)         Assessment and Plan  Diagnoses and all orders for this visit:    1. Amenorrhea (Primary)  -     US Non-ob Transvaginal    2. Simple endometrial hyperplasia without atypia  -     US Non-ob Transvaginal      Patient Instructions   Patient reassured amenorrhea not unusual with IUD. Will however RTO for pelvic ultrasound to assess endometrium           This note was electronically signed.    Emily Rodrigues PA-C   September 25, 2023

## 2023-09-25 NOTE — PATIENT INSTRUCTIONS
Patient reassured amenorrhea not unusual with IUD. Will however RTO for pelvic ultrasound to assess endometrium

## 2023-09-28 RX ORDER — EPINEPHRINE 0.3 MG/.3ML
INJECTION SUBCUTANEOUS
Qty: 1 EACH | Refills: 1 | Status: SHIPPED | OUTPATIENT
Start: 2023-09-28

## 2023-10-03 RX ORDER — TRIAMCINOLONE ACETONIDE 1 MG/G
CREAM TOPICAL 2 TIMES DAILY
Qty: 80 G | Refills: 1 | Status: SHIPPED | OUTPATIENT
Start: 2023-10-03

## 2023-10-19 ENCOUNTER — OFFICE VISIT (OUTPATIENT)
Dept: PRIMARY CARE CLINIC | Age: 28
End: 2023-10-19
Payer: MEDICAID

## 2023-10-19 VITALS
DIASTOLIC BLOOD PRESSURE: 73 MMHG | SYSTOLIC BLOOD PRESSURE: 99 MMHG | HEART RATE: 94 BPM | OXYGEN SATURATION: 97 % | BODY MASS INDEX: 51.82 KG/M2 | WEIGHT: 293 LBS

## 2023-10-19 DIAGNOSIS — J45.21 MILD INTERMITTENT ASTHMA WITH ACUTE EXACERBATION: Primary | ICD-10-CM

## 2023-10-19 PROCEDURE — G8427 DOCREV CUR MEDS BY ELIG CLIN: HCPCS | Performed by: PHYSICIAN ASSISTANT

## 2023-10-19 PROCEDURE — G8484 FLU IMMUNIZE NO ADMIN: HCPCS | Performed by: PHYSICIAN ASSISTANT

## 2023-10-19 PROCEDURE — 99213 OFFICE O/P EST LOW 20 MIN: CPT | Performed by: PHYSICIAN ASSISTANT

## 2023-10-19 PROCEDURE — G8417 CALC BMI ABV UP PARAM F/U: HCPCS | Performed by: PHYSICIAN ASSISTANT

## 2023-10-19 PROCEDURE — 4004F PT TOBACCO SCREEN RCVD TLK: CPT | Performed by: PHYSICIAN ASSISTANT

## 2023-10-19 RX ORDER — ALBUTEROL SULFATE 90 UG/1
2 AEROSOL, METERED RESPIRATORY (INHALATION) 4 TIMES DAILY PRN
Qty: 54 G | Refills: 1 | Status: SHIPPED | OUTPATIENT
Start: 2023-10-19

## 2023-10-19 RX ORDER — PREDNISONE 20 MG/1
20 TABLET ORAL 2 TIMES DAILY
Qty: 10 TABLET | Refills: 0 | Status: SHIPPED | OUTPATIENT
Start: 2023-10-19 | End: 2023-10-24

## 2023-10-19 RX ORDER — ALBUTEROL SULFATE 2.5 MG/3ML
2.5 SOLUTION RESPIRATORY (INHALATION) EVERY 6 HOURS PRN
Qty: 120 EACH | Refills: 3 | Status: SHIPPED | OUTPATIENT
Start: 2023-10-19

## 2023-10-19 RX ORDER — AZITHROMYCIN 250 MG/1
250 TABLET, FILM COATED ORAL SEE ADMIN INSTRUCTIONS
Qty: 6 TABLET | Refills: 0 | Status: SHIPPED | OUTPATIENT
Start: 2023-10-19 | End: 2023-10-24

## 2023-10-19 ASSESSMENT — ENCOUNTER SYMPTOMS
ABDOMINAL PAIN: 0
CONSTIPATION: 0
DIARRHEA: 0
WHEEZING: 1
SORE THROAT: 0
EYE PAIN: 0
COUGH: 1
SHORTNESS OF BREATH: 1

## 2023-10-19 NOTE — PROGRESS NOTES
Chief Complaint   Patient presents with    Asthma     Pt reports that yesterday she had an asthma attack and has feel exhausted and experienced chest pressure since. Have you seen any other physician or provider since your last visit yes - OB/GYN    Have you had any other diagnostic tests since your last visit? no    Have you changed or stopped any medications since your last visit? no     Dileep Jackson (:  1995) is a 34 y.o. female,Established patient, here for evaluation of the following chief complaint(s):  Asthma (Pt reports that yesterday she had an asthma attack and has feel exhausted and experienced chest pressure since. )           Subjective   SUBJECTIVE/OBJECTIVE:  HPI  Ms. Hussain Machado is a 34year old female with PMH  Asthma, anxiety and  depression who presents today with complaints of exhaustion and chest congestion , wheezing and SOA. She deneis any sick contacts. She denies any fever or chills. She admits to compliance with Flovent. She reports cough and wheezing is worse at night. She is no longer smoking. Review of Systems   Constitutional:  Positive for fatigue. Negative for chills and fever. HENT:  Negative for congestion, ear pain, nosebleeds and sore throat. Eyes:  Negative for pain and visual disturbance. Respiratory:  Positive for cough, shortness of breath and wheezing. Cardiovascular:  Negative for chest pain and palpitations. Gastrointestinal:  Negative for abdominal pain, constipation and diarrhea. Genitourinary:  Negative for difficulty urinating and flank pain. Musculoskeletal:  Negative for arthralgias, gait problem and myalgias. Skin:  Negative for rash. Neurological:  Negative for syncope, light-headedness and headaches. Psychiatric/Behavioral:  Negative for behavioral problems, confusion and dysphoric mood.            Objective     Vitals:    10/19/23 1602   BP: 99/73   Site: Left Upper Arm   Position: Sitting   Cuff Size: Large Adult

## 2023-10-31 DIAGNOSIS — F41.9 ANXIETY AND DEPRESSION: ICD-10-CM

## 2023-10-31 DIAGNOSIS — F32.A ANXIETY AND DEPRESSION: ICD-10-CM

## 2023-10-31 RX ORDER — FLUOXETINE HYDROCHLORIDE 40 MG/1
CAPSULE ORAL
Qty: 30 CAPSULE | Refills: 2 | Status: SHIPPED | OUTPATIENT
Start: 2023-10-31

## 2023-11-01 DIAGNOSIS — J06.9 ACUTE UPPER RESPIRATORY INFECTION: ICD-10-CM

## 2023-11-02 RX ORDER — IBUPROFEN 600 MG/1
600 TABLET ORAL 3 TIMES DAILY PRN
Qty: 90 TABLET | Refills: 0 | Status: SHIPPED | OUTPATIENT
Start: 2023-11-02

## 2023-12-04 DIAGNOSIS — J06.9 ACUTE UPPER RESPIRATORY INFECTION: ICD-10-CM

## 2023-12-04 RX ORDER — IBUPROFEN 600 MG/1
600 TABLET ORAL 3 TIMES DAILY PRN
Qty: 90 TABLET | Refills: 0 | Status: SHIPPED | OUTPATIENT
Start: 2023-12-04

## 2024-02-21 DIAGNOSIS — G43.909 EPISODIC MIGRAINE: ICD-10-CM

## 2024-02-21 RX ORDER — TOPIRAMATE 50 MG/1
50 TABLET, FILM COATED ORAL 2 TIMES DAILY
Qty: 60 TABLET | Refills: 0 | Status: SHIPPED | OUTPATIENT
Start: 2024-02-21 | End: 2024-05-21

## 2024-06-19 DIAGNOSIS — G43.909 EPISODIC MIGRAINE: ICD-10-CM

## 2024-06-19 RX ORDER — TOPIRAMATE 50 MG/1
50 TABLET, FILM COATED ORAL 2 TIMES DAILY
Qty: 60 TABLET | Refills: 0 | Status: SHIPPED | OUTPATIENT
Start: 2024-06-19 | End: 2024-09-17

## 2024-06-24 SDOH — HEALTH STABILITY: PHYSICAL HEALTH: ON AVERAGE, HOW MANY MINUTES DO YOU ENGAGE IN EXERCISE AT THIS LEVEL?: 0 MIN

## 2024-06-24 SDOH — HEALTH STABILITY: PHYSICAL HEALTH: ON AVERAGE, HOW MANY DAYS PER WEEK DO YOU ENGAGE IN MODERATE TO STRENUOUS EXERCISE (LIKE A BRISK WALK)?: 0 DAYS

## 2024-06-25 ENCOUNTER — OFFICE VISIT (OUTPATIENT)
Dept: PRIMARY CARE CLINIC | Age: 29
End: 2024-06-25
Payer: MEDICAID

## 2024-06-25 VITALS
DIASTOLIC BLOOD PRESSURE: 82 MMHG | OXYGEN SATURATION: 96 % | HEART RATE: 104 BPM | BODY MASS INDEX: 52.39 KG/M2 | WEIGHT: 293 LBS | SYSTOLIC BLOOD PRESSURE: 105 MMHG

## 2024-06-25 DIAGNOSIS — Z76.89 ESTABLISHING CARE WITH NEW DOCTOR, ENCOUNTER FOR: Primary | ICD-10-CM

## 2024-06-25 DIAGNOSIS — N85.00 ENDOMETRIAL HYPERPLASIA: ICD-10-CM

## 2024-06-25 DIAGNOSIS — F60.3 BORDERLINE PERSONALITY DISORDER (HCC): ICD-10-CM

## 2024-06-25 DIAGNOSIS — J45.20 MILD INTERMITTENT ASTHMA WITHOUT COMPLICATION: ICD-10-CM

## 2024-06-25 PROBLEM — J40 BRONCHITIS: Status: RESOLVED | Noted: 2017-10-21 | Resolved: 2024-06-25

## 2024-06-25 PROCEDURE — 4004F PT TOBACCO SCREEN RCVD TLK: CPT | Performed by: PHYSICIAN ASSISTANT

## 2024-06-25 PROCEDURE — G8417 CALC BMI ABV UP PARAM F/U: HCPCS | Performed by: PHYSICIAN ASSISTANT

## 2024-06-25 PROCEDURE — G8427 DOCREV CUR MEDS BY ELIG CLIN: HCPCS | Performed by: PHYSICIAN ASSISTANT

## 2024-06-25 PROCEDURE — 99213 OFFICE O/P EST LOW 20 MIN: CPT | Performed by: PHYSICIAN ASSISTANT

## 2024-06-25 RX ORDER — EPINEPHRINE 0.3 MG/.3ML
0.3 INJECTION SUBCUTANEOUS ONCE
Qty: 0.3 ML | Refills: 0 | Status: SHIPPED | OUTPATIENT
Start: 2024-06-25 | End: 2024-06-25

## 2024-06-25 RX ORDER — ALBUTEROL SULFATE 2.5 MG/3ML
2.5 SOLUTION RESPIRATORY (INHALATION) 4 TIMES DAILY PRN
Qty: 120 EACH | Refills: 3 | Status: SHIPPED | OUTPATIENT
Start: 2024-06-25

## 2024-06-25 SDOH — ECONOMIC STABILITY: FOOD INSECURITY: WITHIN THE PAST 12 MONTHS, YOU WORRIED THAT YOUR FOOD WOULD RUN OUT BEFORE YOU GOT MONEY TO BUY MORE.: SOMETIMES TRUE

## 2024-06-25 SDOH — ECONOMIC STABILITY: HOUSING INSECURITY
IN THE LAST 12 MONTHS, WAS THERE A TIME WHEN YOU DID NOT HAVE A STEADY PLACE TO SLEEP OR SLEPT IN A SHELTER (INCLUDING NOW)?: YES

## 2024-06-25 SDOH — ECONOMIC STABILITY: INCOME INSECURITY: HOW HARD IS IT FOR YOU TO PAY FOR THE VERY BASICS LIKE FOOD, HOUSING, MEDICAL CARE, AND HEATING?: SOMEWHAT HARD

## 2024-06-25 SDOH — ECONOMIC STABILITY: FOOD INSECURITY: WITHIN THE PAST 12 MONTHS, THE FOOD YOU BOUGHT JUST DIDN'T LAST AND YOU DIDN'T HAVE MONEY TO GET MORE.: SOMETIMES TRUE

## 2024-06-25 ASSESSMENT — PATIENT HEALTH QUESTIONNAIRE - PHQ9
3. TROUBLE FALLING OR STAYING ASLEEP: NOT AT ALL
9. THOUGHTS THAT YOU WOULD BE BETTER OFF DEAD, OR OF HURTING YOURSELF: NOT AT ALL
4. FEELING TIRED OR HAVING LITTLE ENERGY: NOT AT ALL
1. LITTLE INTEREST OR PLEASURE IN DOING THINGS: NOT AT ALL
2. FEELING DOWN, DEPRESSED OR HOPELESS: NOT AT ALL
SUM OF ALL RESPONSES TO PHQ QUESTIONS 1-9: 0
8. MOVING OR SPEAKING SO SLOWLY THAT OTHER PEOPLE COULD HAVE NOTICED. OR THE OPPOSITE, BEING SO FIGETY OR RESTLESS THAT YOU HAVE BEEN MOVING AROUND A LOT MORE THAN USUAL: NOT AT ALL
SUM OF ALL RESPONSES TO PHQ9 QUESTIONS 1 & 2: 0
7. TROUBLE CONCENTRATING ON THINGS, SUCH AS READING THE NEWSPAPER OR WATCHING TELEVISION: NOT AT ALL
10. IF YOU CHECKED OFF ANY PROBLEMS, HOW DIFFICULT HAVE THESE PROBLEMS MADE IT FOR YOU TO DO YOUR WORK, TAKE CARE OF THINGS AT HOME, OR GET ALONG WITH OTHER PEOPLE: NOT DIFFICULT AT ALL
6. FEELING BAD ABOUT YOURSELF - OR THAT YOU ARE A FAILURE OR HAVE LET YOURSELF OR YOUR FAMILY DOWN: NOT AT ALL
SUM OF ALL RESPONSES TO PHQ QUESTIONS 1-9: 0
5. POOR APPETITE OR OVEREATING: NOT AT ALL

## 2024-06-25 ASSESSMENT — ENCOUNTER SYMPTOMS
RESPIRATORY NEGATIVE: 1
GASTROINTESTINAL NEGATIVE: 1

## 2024-06-25 NOTE — PROGRESS NOTES
Chief Complaint   Patient presents with    Establish Care     Pt her to establish care with new provider.        Have you seen any other physician or provider since your last visit no    Have you had any other diagnostic tests since your last visit? no    Have you changed or stopped any medications since your last visit? no

## 2024-06-25 NOTE — PROGRESS NOTES
Subjective:     Jen Galvez is a 28 y.o. female.    Chief Complaint   Patient presents with    Establish Care     Pt her to establish care with new provider.        HPI    Pt here for est care with me.     Eczema - on TAC. Helps somewhat. Has seen derm in the past.     Borderline personality disorder - topamax 50mg, prozac 40mg daily. Does not see therapist. Does see psych at [facility] in [place]. No SI/HI. Will occasionally see shadows every few days. Can distinguish real from hallucinations.     Asthma - has flovent and albuterol. Has to use rescue maybe every few months.     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 Does not have epi pen any longer. Would like another one.     Anx/dep - prozac 40mg daily. Will occasionally go off the medication when she feels fine. Has been back on it for 2 days.     Endometrial hyperplasia - had D&C.     SCREENINGS:    Last Depression Screening - score 0, 2024  10 year ASCVD Risk at 21 y/o q4-6yr - The ASCVD Risk score (Neal DK, et al., 2019) failed to calculate for the following reasons:    The 2019 ASCVD risk score is only valid for ages 40 to 79  Diabetes Screening:  DM foot exam- n/a  DM Retinal exam - n/a  Last CRC screen - at 45-76 y/o. Has had 3. BRBPR. Last one was 2019, wnl.   Intimate partner violence - denies, currently   DEXA - at 66 y/o q2 year.   LMP - mirena, denies chance of pregnancy. No plans to become pregnant   Last mammo - at 40-75 y/o q2year.    Last PAP - at 21-64 y/o q3-5year. 2022 wnl. Return in 2027. OB/GYN - Dr. Goel at [facility].   Tobacco use- smoke, 0.5ppd for 2015  Quit - actively trying to quit  EtOH use - occasionally  Quit - n/a  Recreational Drug use - denies   Quit - n/a  Dentist - goes every 6 months     Health Maintenance Review  Health Maintenance   Topic Date Due    Hepatitis B vaccine (1 of 3 - 3-dose series) Never done    Varicella vaccine (1 of 2 - 2-dose childhood series) Never done    Pneumococcal 0-64 years

## 2024-07-30 DIAGNOSIS — G43.909 EPISODIC MIGRAINE: ICD-10-CM

## 2024-08-01 RX ORDER — TOPIRAMATE 50 MG/1
50 TABLET, FILM COATED ORAL 2 TIMES DAILY
Qty: 180 TABLET | Refills: 0 | Status: SHIPPED | OUTPATIENT
Start: 2024-08-01

## 2025-03-30 ENCOUNTER — HOSPITAL ENCOUNTER (EMERGENCY)
Facility: HOSPITAL | Age: 30
Discharge: HOME | End: 2025-03-30
Attending: FAMILY MEDICINE
Payer: MEDICAID

## 2025-03-30 VITALS
WEIGHT: 293 LBS | HEIGHT: 65 IN | DIASTOLIC BLOOD PRESSURE: 64 MMHG | RESPIRATION RATE: 16 BRPM | TEMPERATURE: 97.6 F | HEART RATE: 104 BPM | SYSTOLIC BLOOD PRESSURE: 121 MMHG | BODY MASS INDEX: 48.82 KG/M2 | OXYGEN SATURATION: 95 %

## 2025-03-30 DIAGNOSIS — B37.2 CANDIDAL DERMATITIS: Primary | ICD-10-CM

## 2025-03-30 PROCEDURE — 99283 EMERGENCY DEPT VISIT LOW MDM: CPT

## 2025-03-30 RX ORDER — CLOTRIMAZOLE AND BETAMETHASONE DIPROPIONATE 10; .64 MG/G; MG/G
CREAM TOPICAL
Qty: 30 G | Refills: 0 | Status: SHIPPED | OUTPATIENT
Start: 2025-03-30

## 2025-03-30 RX ORDER — FLUCONAZOLE 150 MG/1
150 TABLET ORAL DAILY
Qty: 3 TABLET | Refills: 0 | Status: SHIPPED | OUTPATIENT
Start: 2025-03-30 | End: 2025-04-02

## 2025-03-30 ASSESSMENT — LIFESTYLE VARIABLES
HOW MANY STANDARD DRINKS CONTAINING ALCOHOL DO YOU HAVE ON A TYPICAL DAY: PATIENT DOES NOT DRINK
HOW OFTEN DO YOU HAVE A DRINK CONTAINING ALCOHOL: NEVER

## 2025-03-30 ASSESSMENT — PAIN DESCRIPTION - LOCATION: LOCATION: VAGINA;GROIN

## 2025-03-30 ASSESSMENT — PAIN SCALES - GENERAL: PAINLEVEL_OUTOF10: 5

## 2025-03-30 NOTE — ED PROVIDER NOTES
[facility]ERGENCY DEPARTMENT  EMERGENCY DEPARTMENT ENCOUNTER        Pt Name: Jen Galvez  MRN: [id number]  Birthdate 1995  Date of evaluation: 3/30/2025  Provider: Valentín Galvan MD  PCP: Teto Potts PA  Note Started: 8:57 AM EDT 3/30/25    CHIEF COMPLAINT       Chief Complaint   Patient presents with    Rash    Vaginal Itching       HISTORY OF PRESENT ILLNESS: 1 or more Elements     History from : Patient    Limitations to history : None    Jen Galvez is a 29 y.o. female who presents here today because of rash, redness on lower abdomen to the groin area for the past 1 week.  Patient said that he is getting worse.  Patient said that she has been using nystatin powder but is not improving.  Patient said that is painful and raw.  And also is itching.    Nursing Notes were all reviewed and agreed with or any disagreements were addressed in the HPI.    REVIEW OF SYSTEMS :      Review of Systems     systems reviewed and negative except as in HPI/MDM    PAST MEDICAL HISTORY     Past Medical History:   Diagnosis Date    Agoraphobia     Anemia     Anxiety     Borderline personality disorder (HCC)     Depression     GERD (gastroesophageal reflux disease)     Headache     Hypothyroidism     Obesity     OCD (obsessive compulsive disorder)     PCOS (polycystic ovarian syndrome)     Trichotillomania        SURGICAL HISTORY     Past Surgical History:   Procedure Laterality Date    DILATION AND CURETTAGE OF UTERUS      WISDOM TOOTH EXTRACTION         CURRENTMEDICATIONS       Previous Medications    ALBUTEROL (PROVENTIL) (2.5 MG/3ML) 0.083% NEBULIZER SOLUTION    Take 3 mLs by nebulization 4 times daily as needed for Wheezing    ALBUTEROL SULFATE HFA (VENTOLIN HFA) 108 (90 BASE) MCG/ACT INHALER    Inhale 2 puffs into the lungs 4 times daily as needed for Wheezing    EPINEPHRINE (EPIPEN 2-ANGUS) 0.3 MG/0.3ML SOAJ INJECTION    Use as directed for allergic reaction    MIRENA, 52 MG, IUD

## 2025-03-30 NOTE — ED TRIAGE NOTES
Pt reports rash that started in thigh folds, has progressed into labia's and under abdominal fold. Vaginal and groin itching. Has used OTC treatment without relief.

## 2025-03-30 NOTE — DISCHARGE INSTRUCTIONS
Results were discussed with patient.  Advised to follow-up primary care in 2 days.  Come back to the ER symptoms get worse.

## (undated) DEVICE — PREMIUM WET SKIN PREP TRAY CHG: Brand: MEDLINE INDUSTRIES, INC.

## (undated) DEVICE — SUT GUT CHRM 2/0 SH 27IN G123H

## (undated) DEVICE — RICH MINOR LITHOTOMY: Brand: MEDLINE INDUSTRIES, INC.

## (undated) DEVICE — SOL IRR H2O BTL 1000ML STRL

## (undated) DEVICE — GLV SURG BIOGEL M LTX PF 8

## (undated) DEVICE — SLV SCD CALF HEMOFORCE DVT THERP REPROC MD

## (undated) DEVICE — SOL NACL 0.9PCT 1000ML

## (undated) DEVICE — Device

## (undated) DEVICE — ST IRR CYSTO W/SPK 77IN LF

## (undated) DEVICE — GLV SURG BIOGEL M LTX PF 6 1/2

## (undated) DEVICE — Device: Brand: DEFENDO AIR/WATER/SUCTION AND BIOPSY VALVE

## (undated) DEVICE — ENDOGATOR AUXILIARY WATER JET CONNECTOR: Brand: ENDOGATOR